# Patient Record
Sex: FEMALE | Race: WHITE | HISPANIC OR LATINO | Employment: PART TIME | URBAN - NONMETROPOLITAN AREA
[De-identification: names, ages, dates, MRNs, and addresses within clinical notes are randomized per-mention and may not be internally consistent; named-entity substitution may affect disease eponyms.]

---

## 2017-03-27 ENCOUNTER — OFFICE VISIT - GICH (OUTPATIENT)
Dept: FAMILY MEDICINE | Facility: OTHER | Age: 32
End: 2017-03-27

## 2017-03-27 ENCOUNTER — HISTORY (OUTPATIENT)
Dept: FAMILY MEDICINE | Facility: OTHER | Age: 32
End: 2017-03-27

## 2017-03-27 DIAGNOSIS — R30.0 DYSURIA: ICD-10-CM

## 2017-03-27 DIAGNOSIS — N30.00 ACUTE CYSTITIS WITHOUT HEMATURIA: ICD-10-CM

## 2017-03-27 LAB
BILIRUB UR QL: ABNORMAL
CLARITY, URINE: ABNORMAL CLARITY
COLOR UR: YELLOW COLOR
GLUCOSE URINE: NEGATIVE MG/DL
KETONES UR QL: NEGATIVE MG/DL
LEUKOCYTE ESTERASE URINE: ABNORMAL
NITRITE UR QL STRIP: POSITIVE
OCCULT BLOOD,URINE - HISTORICAL: ABNORMAL
PH UR: 6 [PH]
PROTEIN QUALITATIVE,URINE - HISTORICAL: ABNORMAL MG/DL
SP GR UR STRIP: >=1.03
UROBILINOGEN,QUALITATIVE - HISTORICAL: NORMAL EU/DL

## 2017-07-05 ENCOUNTER — OFFICE VISIT - GICH (OUTPATIENT)
Dept: FAMILY MEDICINE | Facility: OTHER | Age: 32
End: 2017-07-05

## 2017-07-05 ENCOUNTER — HISTORY (OUTPATIENT)
Dept: FAMILY MEDICINE | Facility: OTHER | Age: 32
End: 2017-07-05

## 2017-07-05 DIAGNOSIS — R30.0 DYSURIA: ICD-10-CM

## 2017-07-05 LAB
BACTERIA URINE: ABNORMAL BACTERIA/HPF
BILIRUB UR QL: NEGATIVE
CLARITY, URINE: ABNORMAL CLARITY
COLOR UR: YELLOW COLOR
EPITHELIAL CELLS: ABNORMAL EPI/HPF
GLUCOSE URINE: NEGATIVE MG/DL
KETONES UR QL: NEGATIVE MG/DL
LEUKOCYTE ESTERASE URINE: ABNORMAL
NITRITE UR QL STRIP: POSITIVE
OCCULT BLOOD,URINE - HISTORICAL: NEGATIVE
PH UR: 7 [PH]
PROTEIN QUALITATIVE,URINE - HISTORICAL: NEGATIVE MG/DL
RBC - HISTORICAL: ABNORMAL /HPF
SP GR UR STRIP: 1.02
UROBILINOGEN,QUALITATIVE - HISTORICAL: NORMAL EU/DL
WBC - HISTORICAL: ABNORMAL /HPF

## 2017-07-07 LAB
CULTURE - HISTORICAL: ABNORMAL
CULTURE - HISTORICAL: ABNORMAL
SUSCEPTIBILITY RESULT - HISTORICAL: ABNORMAL

## 2017-07-11 ENCOUNTER — AMBULATORY - GICH (OUTPATIENT)
Dept: FAMILY MEDICINE | Facility: OTHER | Age: 32
End: 2017-07-11

## 2017-07-18 ENCOUNTER — AMBULATORY - GICH (OUTPATIENT)
Dept: FAMILY MEDICINE | Facility: OTHER | Age: 32
End: 2017-07-18

## 2017-07-19 ENCOUNTER — HISTORY (OUTPATIENT)
Dept: FAMILY MEDICINE | Facility: OTHER | Age: 32
End: 2017-07-19

## 2017-07-19 ENCOUNTER — OFFICE VISIT - GICH (OUTPATIENT)
Dept: FAMILY MEDICINE | Facility: OTHER | Age: 32
End: 2017-07-19

## 2017-07-19 ENCOUNTER — HOSPITAL ENCOUNTER (OUTPATIENT)
Dept: RADIOLOGY | Facility: OTHER | Age: 32
End: 2017-07-19
Attending: NURSE PRACTITIONER

## 2017-07-19 DIAGNOSIS — S69.91XA UNSPECIFIED INJURY OF RIGHT WRIST, HAND AND FINGER(S), INITIAL ENCOUNTER: ICD-10-CM

## 2017-11-27 ENCOUNTER — AMBULATORY - GICH (OUTPATIENT)
Dept: FAMILY MEDICINE | Facility: OTHER | Age: 32
End: 2017-11-27

## 2017-11-27 ENCOUNTER — HISTORY (OUTPATIENT)
Dept: FAMILY MEDICINE | Facility: OTHER | Age: 32
End: 2017-11-27

## 2017-11-27 ENCOUNTER — OFFICE VISIT - GICH (OUTPATIENT)
Dept: FAMILY MEDICINE | Facility: OTHER | Age: 32
End: 2017-11-27

## 2017-11-27 DIAGNOSIS — M54.41 LOW BACK PAIN WITH RIGHT-SIDED SCIATICA: ICD-10-CM

## 2017-12-18 ENCOUNTER — AMBULATORY - GICH (OUTPATIENT)
Dept: FAMILY MEDICINE | Facility: OTHER | Age: 32
End: 2017-12-18

## 2017-12-19 ENCOUNTER — HISTORY (OUTPATIENT)
Dept: FAMILY MEDICINE | Facility: OTHER | Age: 32
End: 2017-12-19

## 2017-12-19 ENCOUNTER — AMBULATORY - GICH (OUTPATIENT)
Dept: RADIOLOGY | Facility: OTHER | Age: 32
End: 2017-12-19

## 2017-12-19 ENCOUNTER — OFFICE VISIT - GICH (OUTPATIENT)
Dept: FAMILY MEDICINE | Facility: OTHER | Age: 32
End: 2017-12-19

## 2017-12-19 DIAGNOSIS — M54.50 LOW BACK PAIN: ICD-10-CM

## 2017-12-19 DIAGNOSIS — R30.0 DYSURIA: ICD-10-CM

## 2017-12-19 LAB
BACTERIA URINE: ABNORMAL BACTERIA/HPF
BILIRUB UR QL: NEGATIVE
CLARITY, URINE: ABNORMAL CLARITY
COLOR UR: YELLOW COLOR
EPITHELIAL CELLS: ABNORMAL EPI/HPF
GLUCOSE URINE: NEGATIVE MG/DL
HCG UR QL: NEGATIVE
KETONES UR QL: NEGATIVE MG/DL
LEUKOCYTE ESTERASE URINE: ABNORMAL
NITRITE UR QL STRIP: POSITIVE
OCCULT BLOOD,URINE - HISTORICAL: ABNORMAL
PH UR: 6 [PH]
PROTEIN QUALITATIVE,URINE - HISTORICAL: NEGATIVE MG/DL
RBC - HISTORICAL: ABNORMAL /HPF
SP GR UR STRIP: 1.02
UROBILINOGEN,QUALITATIVE - HISTORICAL: NORMAL EU/DL
WBC - HISTORICAL: ABNORMAL /HPF

## 2017-12-21 ENCOUNTER — COMMUNICATION - GICH (OUTPATIENT)
Dept: FAMILY MEDICINE | Facility: OTHER | Age: 32
End: 2017-12-21

## 2017-12-22 ENCOUNTER — AMBULATORY - GICH (OUTPATIENT)
Dept: FAMILY MEDICINE | Facility: OTHER | Age: 32
End: 2017-12-22

## 2017-12-26 ENCOUNTER — HISTORY (OUTPATIENT)
Dept: EMERGENCY MEDICINE | Facility: OTHER | Age: 32
End: 2017-12-26

## 2017-12-27 NOTE — PROGRESS NOTES
Patient Information     Patient Name MRN Sex Nato Bustillos [id number] Female 1985      Progress Notes by Nick Velarde MD at 2017  4:00 PM     Author:  Nick Velarde MD Service:  (none) Author Type:  Physician     Filed:  2017  7:55 AM Encounter Date:  2017 Status:  Signed     :  Nick Velarde MD (Physician)            SUBJECTIVE:  Nato Bradley is a 32 y.o. Female.  She comes in due to 2 a 2 week history of urinary frequency, dribbling and dysuria. She has not had any fevers or chills that she is aware of but admits that she has not checked her temperature. She is not really sure if she is having back pain. She reports her  has had a vasectomy and she has had normal menses.      OBJECTIVE:  /64  Pulse 68  Wt 61.7 kg (136 lb)  BMI 26.12 kg/m2  EXAM:  General Appearance: Pleasant, alert, appropriate appearance for age. No acute distress  Gastrointestinal Exam: Mild suprapubic tenderness.  Musculoskeletal Exam: No CVA tenderness    Results for orders placed or performed in visit on 17      URINALYSIS W REFLEX MICROSCOPIC IF POSITIVE      Result  Value Ref Range    COLOR                     Yellow Yellow Color    CLARITY                   Slightly Cloudy (A) Clear Clarity    SPECIFIC GRAVITY,URINE    1.020 1.010, 1.015, 1.020, 1.025                    PH,URINE                  7.0 6.0, 7.0, 8.0, 5.5, 6.5, 7.5, 8.5                    UROBILINOGEN,QUALITATIVE  Normal Normal EU/dl    PROTEIN, URINE Negative Negative mg/dL    GLUCOSE, URINE Negative Negative mg/dL    KETONES,URINE             Negative Negative mg/dL    BILIRUBIN,URINE           Negative Negative                    OCCULT BLOOD,URINE        Negative Negative                    NITRITE                   Positive (A) Negative                    LEUKOCYTE ESTERASE        Trace (A) Negative                   URINALYSIS MICROSCOPIC      Result  Value Ref Range    RBC None Seen 0-2,  None Seen /HPF    WBC 0-2 0-2, 3-5, None Seen /HPF    BACTERIA                  Many (A) None Seen, Rare, Occasional, Few Bacteria/HPF    EPITHELIAL CELLS          Few None Seen, Few Epi/HPF   URINE CULTURE      Result  Value Ref Range    CULTURE RESULT (A)     CULTURE >100,000 CFU/mL Gram negative bacilli        ASSESSMENT/Plan :      Nato was seen today for uti.    Diagnoses and all orders for this visit:    Dysuria   his signs and symptoms consistent with urinary tract infection. We'll culture. Given length of symptoms I would suggest ciprofloxacin rather than Macrobid. I discussed with patient pros and cons of both medication. She will call if she is not having complete resolution of symptoms, or if she develops fevers, chills or flank pain.  -     URINALYSIS W REFLEX MICROSCOPIC IF POSITIVE; Future  -     URINALYSIS W REFLEX MICROSCOPIC IF POSITIVE  -     URINALYSIS MICROSCOPIC; Future  -     URINALYSIS MICROSCOPIC  -     ciprofloxacin HCl (CIPRO) 250 mg tablet; Take 1 tablet by mouth 2 times daily.  -     URINE CULTURE; Future  -     URINE CULTURE        There are no Patient Instructions on file for this visit.    Nick Velarde MD    This document was created using computer generated templates and voice activated software.

## 2017-12-28 NOTE — PROGRESS NOTES
"Patient Information     Patient Name MRN Sex Nato Bustillos [id number] Female 1985      Progress Notes by Romelia Sage NP at 2017 10:00 AM     Author:  Romelia Sage NP Service:  (none) Author Type:  PHYS- Nurse Practitioner     Filed:  2017 12:23 PM Encounter Date:  2017 Status:  Signed     :  Romelia Sage NP (PHYS- Nurse Practitioner)            Nursing Notes:   Michelle Kwon  2017 10:23 AM  Signed  Patient presents to clinic with pain in the right index,middle, and ring finger. She hit them on metal bunks beds Saturday.  Michelle Mcgee ....................  2017   10:11 AM    SUBJECTIVE:    Nato Bradley is a 32 y.o. female who presents for finger pain    HPI  Mechanism of injury: Hit fingers on bed now 2nd, 3rd and 4th fingers  Pain is described as sharp, tingling, achy and is located RT fingers  Intensity is 5-7 /10.  Duration has been for DOI 7/15/17  It does radiate slightly into the wrist.  Ice makes it better.  Using the hand makes it worse.  Associated symptoms include: none  Immediate pain, swelling and no bruising after the incident   There is past injury to the Fingers, contusions.     No current outpatient prescriptions on file prior to visit.     No current facility-administered medications on file prior to visit.        REVIEW OF SYSTEMS:  ROS    OBJECTIVE:  /54  Pulse 72  Temp 98.8  F (37.1  C) (Temporal)  Resp 18  Ht 1.543 m (5' 0.75\")  Wt 61.6 kg (135 lb 12.8 oz)  LMP 2017  BMI 25.87 kg/m2    EXAM:   Physical Exam   Constitutional: She is oriented to person, place, and time and well-developed, well-nourished, and in no distress.   HENT:   Head: Normocephalic and atraumatic.   Eyes: Conjunctivae are normal.   Cardiovascular: Normal rate.    Pulmonary/Chest: Effort normal. No respiratory distress.   Musculoskeletal:        Right wrist: Normal.        Right hand: She exhibits decreased range of motion and " tenderness. She exhibits no bony tenderness, normal capillary refill, no deformity, no laceration and no swelling. Normal sensation noted. Normal strength noted. She exhibits no thumb/finger opposition and no wrist extension trouble.   Neurological: She is alert and oriented to person, place, and time.   Skin: Skin is warm and dry.   Nursing note and vitals reviewed.    Completed Hand xray.  I personally reviewed the xray. There was no fractures noted upon initial read of xray.  Final read pending by radiology.    ASSESSMENT/PLAN:    ICD-10-CM    1. Injury of finger, right, initial encounter S69.91XA XR HAND 3 VIEWS RIGHT        Plan:  Finger contusions. Rest, ice, NSAIDs discussed. I explained my diagnostic considerations and recommendations to the patient, who voiced understanding and agreement with the treatment plan. All questions were answered. We discussed potential side effects of any prescribed or recommended therapies, as well as expectations for response to treatments. She was advised to contact our office if there is no improvement or worsening of conditions or symptoms.  If s/s worsen or persist, patient will either come back or follow up with PCP.       TAMAR MARTINEZ NP ....................  7/19/2017   12:23 PM

## 2017-12-28 NOTE — TELEPHONE ENCOUNTER
Patient Information     Patient Name MRN Sex Nato Torres [id number] Female 1985      Telephone Encounter by Yazmin Rascon at 2017 11:50 AM     Author:  Yazmin Rascon Service:  (none) Author Type:  (none)     Filed:  2017 11:50 AM Encounter Date:  2017 Status:  Signed     :  Yazmin Rascon            Appointment made.

## 2017-12-28 NOTE — PROGRESS NOTES
Patient Information     Patient Name MRN Sex Nato Torres [id number] Female 1985      Progress Notes by Parminder Cardona MD at 2017  3:30 PM     Author:  Parminder Cardona MD Service:  (none) Author Type:  Physician     Filed:  2017  3:58 PM Encounter Date:  2017 Status:  Signed     :  Parminder Cardona MD (Physician)            Nursing Notes:   Luana Huddleston  2017  3:42 PM  Signed  Patient presents to the clinic with back pain since September.  She's tried massage and chiro.  Luana Huddleston LPN....................2017 3:32 PM    Nato Bradley is a 32 y.o. female who presents for   Chief Complaint     Patient presents with       Back Pain      Pain since Sept.     HPI: Ms. Bradley has had marked R lower back pain with scitica to mid thigh; 3 weeks with no recall of injury; she has had similar but less severe symptoms in the past; sh esees chiro with minimal relief. Taking no medications regularly/ no trouble with bowel nor bladder and noloss of strength  Past Medical History:     Diagnosis  Date     History of depression      Past Surgical History:      Procedure  Laterality Date      SECTION       WISDOM TEETH EXTRACTION       Family History      Problem  Relation Age of Onset     Diabetes Mother      Hypertension Mother      Thyroid Disease Mother      Heart Disease Maternal Grandmother      Hypertension Maternal Grandmother      Heart Disease Maternal Grandfather      Cancer-prostate Maternal Grandfather      Diabetes Maternal Grandfather      Hypertension Maternal Grandfather      Hypertension Maternal Aunt      Thyroid Disease Maternal Aunt      Hypertension Paternal Aunt      Cancer-breast No Family History      Cancer-colon No Family History      Cancer-ovarian No Family History      Stroke No Family History      Blood Disease No Family History      Current Outpatient Prescriptions       Medication  Sig Dispense Refill     cyclobenzaprine  "(FLEXERIL) 10 mg tablet Take 1 tablet by mouth at bedtime if needed for Muscle Spasm. 10 tablet 0     predniSONE (DELTASONE) 20 mg tablet 3 daily for 3 days, then 2 daily for 3 days, then 1 daily for 3 with food 18 tablet 0     No current facility-administered medications for this visit.      Medications have been reviewed by me and are current to the best of my knowledge and ability.    No Known Allergies       EXAM:   Vitals:     11/27/17 1533   BP: 108/72   Temp: 99.8  F (37.7  C)   TempSrc: Temporal   Weight: 65 kg (143 lb 3.2 oz)   Height: 1.54 m (5' 0.63\")     General Appearance: Pleasant, alert, appropriate appearance for age. No acute distress  Musculoskeletal Exam: Back is straight and non-tender, full ROM of upper and lower extremities.  Neurologic Exam: Nonfocal; symmetric DTRs, normal gross motor movement, tone, and coordination. No tremor. Looks in pain but has great toe and heel walking  ASSESSMENT AND PLAN:  1. Acute right-sided low back pain with right-sided sciatica/ recheck if not markedly improved 1 week    - cyclobenzaprine (FLEXERIL) 10 mg tablet; Take 1 tablet by mouth at bedtime if needed for Muscle Spasm.  Dispense: 10 tablet; Refill: 0  - predniSONE (DELTASONE) 20 mg tablet; 3 daily for 3 days, then 2 daily for 3 days, then 1 daily for 3 with food  Dispense: 18 tablet; Refill: 0                 "

## 2017-12-29 NOTE — PATIENT INSTRUCTIONS
Patient Information     Patient Name MRN Sex Nato Torres [id number] Female 1985      Patient Instructions by Romelia Sage NP at 2017 10:00 AM     Author:  Romelia Sage NP Service:  (none) Author Type:  PHYS- Nurse Practitioner     Filed:  2017 11:02 AM Encounter Date:  2017 Status:  Signed     :  Romelia Sage NP (PHYS- Nurse Practitioner)            The x-ray today showed no sign of fracture. Radiologist will review the X-ray within 24-48 hours, I will contact you if the radiologist finds anything of significance on the x-ray that I did not see.    Rest the hand, avoid any activity which causes pain.    Apply cold packs to the affected area for 15-20 minutes, 4 times a day. A bag of frozen corn or peas often works well as a cold pack. A cold pack is usually the best treatment for the 1st 2 days after an injury. After 48 hours, apply heat or ice, whichever gives relief.    Elevate the injured area as much as you are able. If you can get this higher than the heart, this will help minimize pain and swelling.    Also, Take ibuprofen (Advil, Motrin) or naproxen (Aleve), or a similar prescription medication. Use regularly for the first 7-10 days. Later, take as needed for pain, swelling or stiffness. Take this type of medication with food to minimize any stomach irritation. Tylenol may also be taken to help ease the pain.    Call or return to clinic as needed if your pain becomes significantly worse, or fails to improve as anticipated despite following the above recommendations.

## 2017-12-30 NOTE — NURSING NOTE
Patient Information     Patient Name MRN Sex Nato Torres [id number] Female 1985      Nursing Note by Michelle Kwon at 2017 10:00 AM     Author:  Michelle Kwon Service:  (none) Author Type:  (none)     Filed:  2017 10:23 AM Encounter Date:  2017 Status:  Signed     :  Michelle Kwon            Patient presents to clinic with pain in the right index,middle, and ring finger. She hit them on metal bunks beds Saturday.  Michelle KwonLPN ....................  2017   10:11 AM

## 2017-12-30 NOTE — NURSING NOTE
Patient Information     Patient Name MRN Sex Nato Torres [id number] Female 1985      Nursing Note by Luana Huddleston at 2017  3:30 PM     Author:  Luana Huddleston Service:  (none) Author Type:  (none)     Filed:  2017  3:42 PM Encounter Date:  2017 Status:  Signed     :  Luana Huddleston            Patient presents to the clinic with back pain since September.  She's tried massage and chiro.  Luana Huddleston LPN....................2017 3:32 PM

## 2018-01-04 NOTE — NURSING NOTE
Patient Information     Patient Name MRN Sex Nato Torres [id number] Female 1985      Nursing Note by Martha Henry at 3/27/2017 12:00 PM     Author:  Martha Henry Service:  (none) Author Type:  (none)     Filed:  3/27/2017 12:19 PM Encounter Date:  3/27/2017 Status:  Signed     :  Martha Henryashlee Bradley is a 32 y.o. female presenting with dysuria. Urine analysis initiated per verbal order that was read back and verified.   Martha Henry LPN 3/27/2017 12:06 PM

## 2018-01-04 NOTE — PATIENT INSTRUCTIONS
Patient Information     Patient Name MRN Sex Nato Torres [id number] Female 1985      Patient Instructions by Ranjith He MD at 3/27/2017 12:00 PM     Author:  Ranjith He MD Service:  (none) Author Type:  Physician     Filed:  3/27/2017 12:29 PM Encounter Date:  3/27/2017 Status:  Signed     :  Ranjith He MD (Physician)            Sending urine for culture  Take Bactrim twice daily for 5 days  Call if side effects or MyChart message  Urine culture back Wed to see if Bactrim will work  Return for fever, back pain, nausea

## 2018-01-04 NOTE — PROGRESS NOTES
Patient Information     Patient Name MRN Sex Nato Torres [id number] Female 1985      Progress Notes by Ranjith He MD at 3/27/2017 12:00 PM     Author:  Ranjith He MD Service:  (none) Author Type:  Physician     Filed:  3/28/2017  8:19 AM Encounter Date:  3/27/2017 Status:  Signed     :  Ranjith He MD (Physician)            SUBJECTIVE:  32 y.o. female presents as walk-in to [facility] for dysuria for a week. Some bilateral flank pain. Hx of numerous UTI, but none for months. She cannot recall exactly when, but [place] was .  Is breast feeding 11 mo old  Denies fever or nausea.   Hx of pyelo when she was a teenager.      No current outpatient prescriptions on file.     Allergies as of 2017      (No Known Allergies)       OBJECTIVE:  Visit Vitals       /78     Pulse 68     Wt 63 kg (139 lb)     Breastfeeding No     BMI 26.7 kg/m2     General Appearance: Pleasant, alert, appropriate appearance for age. No acute distress  Neurologic Exam: bilateral lower back pain, but not CVA tenderness    Results for orders placed or performed in visit on 17      UA DIP ONLY GRH GIH NON AUTOMATED      Result  Value Ref Range    COLOR                     Yellow Yellow Color    CLARITY                   Cloudy (A) Clear Clarity    SPECIFIC GRAVITY,URINE    >=1.030 (A) 1.010, 1.015, 1.020, 1.025                    PH,URINE                  6.0 6.0, 7.0, 8.0, 5.5, 6.5, 7.5, 8.5                    UROBILINOGEN,QUALITATIVE  Normal Normal EU/dl    PROTEIN, URINE Trace (A) Negative mg/dL    GLUCOSE, URINE Negative Negative mg/dL    KETONES,URINE             Negative Negative mg/dL    BILIRUBIN,URINE           Abnormal (A) Negative                    OCCULT BLOOD,URINE        Large (A) Negative                    NITRITE                   Positive (A) Negative                    LEUKOCYTE ESTERASE        Moderate (A)  Negative                        ASSESSMENT/PLAN:    ICD-10-CM   1. Acute cystitis without hematuria N30.00   2. Dysuria R30.0     UA shows UTI. She had problems with some antibiotics in the past ,but does not recall which. For breastfeeding and possible coverage for pyelo given previous problems, recommend Bactrim over nitrofurantoin, cipro or other meds. If she has problems with this, then consider cefdinir or other cephalosporin.  Plan to send urine for culture. Call if side effects with medication.   F/U PRN

## 2018-01-27 VITALS
BODY MASS INDEX: 25.91 KG/M2 | SYSTOLIC BLOOD PRESSURE: 108 MMHG | WEIGHT: 136 LBS | DIASTOLIC BLOOD PRESSURE: 64 MMHG | HEART RATE: 68 BPM

## 2018-01-27 VITALS
SYSTOLIC BLOOD PRESSURE: 108 MMHG | BODY MASS INDEX: 27.03 KG/M2 | TEMPERATURE: 99.8 F | DIASTOLIC BLOOD PRESSURE: 72 MMHG | WEIGHT: 143.2 LBS | HEIGHT: 61 IN

## 2018-01-27 VITALS
BODY MASS INDEX: 26.7 KG/M2 | SYSTOLIC BLOOD PRESSURE: 120 MMHG | WEIGHT: 139 LBS | HEART RATE: 68 BPM | DIASTOLIC BLOOD PRESSURE: 78 MMHG

## 2018-01-27 VITALS
HEIGHT: 61 IN | BODY MASS INDEX: 25.64 KG/M2 | SYSTOLIC BLOOD PRESSURE: 122 MMHG | DIASTOLIC BLOOD PRESSURE: 54 MMHG | TEMPERATURE: 98.8 F | HEART RATE: 72 BPM | RESPIRATION RATE: 18 BRPM | WEIGHT: 135.8 LBS

## 2018-01-28 ENCOUNTER — HEALTH MAINTENANCE LETTER (OUTPATIENT)
Age: 33
End: 2018-01-28

## 2018-02-03 NOTE — PROGRESS NOTES
SUBJECTIVE:   CC: Nato Bradley is an 33 year old woman who presents for preventive health visit.     HPI    patient reports a family hx of thyroid disorder. Sister recently diagnosed with hashimoto's. Mom with hx of hyperthyroidism.   Patient reports hx of recurrent UTIs. 3 UAs with + nitrate in the past year (one without), no urine cultures done. Patient does not currently have symptoms.       Today's PHQ-2 Score:   PHQ-2 ( 1999 [employer]) 2/12/2018   Q1: Little interest or pleasure in doing things 0   Q2: Feeling down, depressed or hopeless 0   PHQ-2 Score 0         Social History   Substance Use Topics     Smoking status: Never Smoker     Smokeless tobacco: Never Used     Alcohol use 0.0 oz/week      Comment: Alcoholic Drinks/day: Beer or wine     No flowsheet data found.    Reviewed orders with patient.  Reviewed health maintenance and updated orders accordingly - Yes  Patient Active Problem List   Diagnosis     Depressive disorder     Past Medical History:   Diagnosis Date     Personal history of other mental and behavioral disorders     No Comments Provided     Family History   Problem Relation Age of Onset     DIABETES Mother      Diabetes     Hypertension Mother      Hypertension     Thyroid Disease Mother      Thyroid Disease     Thyroid Disease Sister      hashimoto's (dx 2018)     HEART DISEASE Maternal Grandmother      Heart Disease     Hypertension Maternal Grandmother      Hypertension     Lung Cancer Maternal Grandmother      ? cervical cancer in 20s.      HEART DISEASE Maternal Grandfather      Heart Disease     Prostate Cancer Maternal Grandfather      Cancer-prostate     DIABETES Maternal Grandfather      Diabetes     Hypertension Maternal Grandfather      Hypertension     Hypertension Maternal Aunt      Hypertension     Thyroid Disease Maternal Aunt      Thyroid Disease     Hypertension Paternal Aunt      Hypertension     Breast Cancer No family hx of      Cancer-breast     Colon Cancer No  family hx of      Cancer-colon     Ovarian Cancer No family hx of      Cancer-ovarian     Other - See Comments No family hx of      Stroke     Blood Disease No family hx of      Blood Disease       Family History   Problem Relation Age of Onset     DIABETES Mother      Diabetes     Hypertension Mother      Hypertension     Thyroid Disease Mother      Thyroid Disease     Thyroid Disease Sister      hashimoto's (dx 2018)     HEART DISEASE Maternal Grandmother      Heart Disease     Hypertension Maternal Grandmother      Hypertension     Lung Cancer Maternal Grandmother      ? cervical cancer in 20s.      HEART DISEASE Maternal Grandfather      Heart Disease     Prostate Cancer Maternal Grandfather      Cancer-prostate     DIABETES Maternal Grandfather      Diabetes     Hypertension Maternal Grandfather      Hypertension     Hypertension Maternal Aunt      Hypertension     Thyroid Disease Maternal Aunt      Thyroid Disease     Hypertension Paternal Aunt      Hypertension     Breast Cancer No family hx of      Cancer-breast     Colon Cancer No family hx of      Cancer-colon     Ovarian Cancer No family hx of      Cancer-ovarian     Other - See Comments No family hx of      Stroke     Blood Disease No family hx of      Blood Disease     Mammogram not appropriate for this patient based on age.    History of abnormal Pap smear: NO - age 30-65 PAP every 5 years with negative HPV co-testing recommended    Reviewed and updated as needed this visit by clinical staff  Tobacco  Allergies  Meds  Soc Hx        Reviewed and updated as needed this visit by Provider          Review of Systems  C: NEGATIVE for fever, chills, change in weight  I: NEGATIVE for worrisome rashes, moles or lesions  E: NEGATIVE for vision changes or irritation  ENT: NEGATIVE for ear, mouth and throat problems  R: NEGATIVE for significant cough or SOB  B: NEGATIVE for masses, tenderness or discharge  CV: NEGATIVE for chest pain, palpitations or peripheral  "edema  GI: NEGATIVE for nausea, abdominal pain, heartburn, or change in bowel habits  : NEGATIVE for unusual urinary or vaginal symptoms. Periods are regular.  M: NEGATIVE for significant arthralgias or myalgia  N: NEGATIVE for weakness, dizziness or paresthesias  P: NEGATIVE for changes in mood or affect     OBJECTIVE:   /78 (BP Location: Right arm, Patient Position: Sitting, Cuff Size: Adult Regular)  Pulse 62  Temp 98.1  F (36.7  C) (Tympanic)  Ht 5' 0.24\" (1.53 m)  Wt 145 lb (65.8 kg)  LMP  (Within Weeks)  Breastfeeding? No  BMI 28.1 kg/m2  Physical Exam  GENERAL: healthy, alert and no distress  EYES: Eyes grossly normal to inspection, PERRL and conjunctivae and sclerae normal  HENT: ear canals and TM's normal, nose and mouth without ulcers or lesions  NECK: no adenopathy, no asymmetry, masses, or scars and thyroid normal to palpation  RESP: lungs clear to auscultation - no rales, rhonchi or wheezes  BREAST: normal without masses, tenderness or nipple discharge and no palpable axillary masses or adenopathy  CV: regular rate and rhythm, normal S1 S2, no S3 or S4, no murmur, click or rub, no peripheral edema and peripheral pulses strong  ABDOMEN: soft, nontender, no hepatosplenomegaly, no masses and bowel sounds normal   (female): normal female external genitalia, normal urethral meatus, vaginal mucosa pink, moist, well rugated, and normal cervix/adnexa/uterus without masses or discharge  Pap smear obtained.   MS: no gross musculoskeletal defects noted, no edema  SKIN: no suspicious lesions or rashes  NEURO: Normal strength and tone, mentation intact and speech normal  PSYCH: mentation appears normal, affect normal/bright    ASSESSMENT/PLAN:       ICD-10-CM    1. Family history of thyroid disease Z83.49 Thyrotropin GH     Thyroid peroxidase antibody     T4, free     T3, total   2. Screening for cervical cancer Z12.4 Pap Screen Thin Prep with HPV - recommended age 30 - 65 years (select HPV order " "below)   3. Health care maintenance Z00.00 Lipid Profile (Chol, Trig, HDL, LDL calc) - NON FASTING       COUNSELING:  Reviewed preventive health counseling, as reflected in patient instructions       reports that she has never smoked. She has never used smokeless tobacco.    Estimated body mass index is 28.1 kg/(m^2) as calculated from the following:    Height as of this encounter: 5' 0.24\" (1.53 m).    Weight as of this encounter: 145 lb (65.8 kg).   Weight management plan: Discussed healthy diet and exercise guidelines and patient will follow up in 12 months in clinic to re-evaluate.    Counseling Resources:  ATP IV Guidelines  Pooled Cohorts Equation Calculator  Breast Cancer Risk Calculator  FRAX Risk Assessment  ICSI Preventive Guidelines  Dietary Guidelines for Americans, 2010  USDA's MyPlate  ASA Prophylaxis  Lung CA Screening    Kristal Becker MD  [facility]  "

## 2018-02-08 ENCOUNTER — DOCUMENTATION ONLY (OUTPATIENT)
Dept: FAMILY MEDICINE | Facility: OTHER | Age: 33
End: 2018-02-08

## 2018-02-09 VITALS
WEIGHT: 141 LBS | TEMPERATURE: 98.9 F | DIASTOLIC BLOOD PRESSURE: 80 MMHG | SYSTOLIC BLOOD PRESSURE: 122 MMHG | BODY MASS INDEX: 26.97 KG/M2

## 2018-02-12 ENCOUNTER — OFFICE VISIT (OUTPATIENT)
Dept: FAMILY MEDICINE | Facility: OTHER | Age: 33
End: 2018-02-12
Attending: FAMILY MEDICINE
Payer: COMMERCIAL

## 2018-02-12 VITALS
DIASTOLIC BLOOD PRESSURE: 78 MMHG | WEIGHT: 145 LBS | HEIGHT: 60 IN | HEART RATE: 62 BPM | TEMPERATURE: 98.1 F | BODY MASS INDEX: 28.47 KG/M2 | SYSTOLIC BLOOD PRESSURE: 122 MMHG

## 2018-02-12 DIAGNOSIS — Z00.00 HEALTH CARE MAINTENANCE: ICD-10-CM

## 2018-02-12 DIAGNOSIS — Z83.49 FAMILY HISTORY OF THYROID DISEASE: Primary | ICD-10-CM

## 2018-02-12 DIAGNOSIS — Z12.4 SCREENING FOR CERVICAL CANCER: ICD-10-CM

## 2018-02-12 LAB
CHOLEST SERPL-MCNC: 164 MG/DL
HDLC SERPL-MCNC: 40 MG/DL (ref 23–92)
LDLC SERPL CALC-MCNC: 101 MG/DL
NONHDLC SERPL-MCNC: 124 MG/DL
T3 SERPL-MCNC: 141 NG/DL (ref 87–178)
T4 FREE SERPL-MCNC: 0.81 NG/DL (ref 0.6–1.6)
TRIGL SERPL-MCNC: 115 MG/DL
TSH SERPL DL<=0.05 MIU/L-ACNC: 1.28 IU/ML (ref 0.34–5.6)

## 2018-02-12 PROCEDURE — 87624 HPV HI-RISK TYP POOLED RSLT: CPT | Performed by: FAMILY MEDICINE

## 2018-02-12 PROCEDURE — 84443 ASSAY THYROID STIM HORMONE: CPT | Performed by: FAMILY MEDICINE

## 2018-02-12 PROCEDURE — 88142 CYTOPATH C/V THIN LAYER: CPT | Performed by: FAMILY MEDICINE

## 2018-02-12 PROCEDURE — 99395 PREV VISIT EST AGE 18-39: CPT | Mod: 25 | Performed by: FAMILY MEDICINE

## 2018-02-12 PROCEDURE — 84480 ASSAY TRIIODOTHYRONINE (T3): CPT | Performed by: FAMILY MEDICINE

## 2018-02-12 PROCEDURE — 84439 ASSAY OF FREE THYROXINE: CPT | Performed by: FAMILY MEDICINE

## 2018-02-12 PROCEDURE — 36415 COLL VENOUS BLD VENIPUNCTURE: CPT | Performed by: FAMILY MEDICINE

## 2018-02-12 PROCEDURE — 86376 MICROSOMAL ANTIBODY EACH: CPT | Performed by: FAMILY MEDICINE

## 2018-02-12 PROCEDURE — G0123 SCREEN CERV/VAG THIN LAYER: HCPCS | Performed by: FAMILY MEDICINE

## 2018-02-12 PROCEDURE — 80061 LIPID PANEL: CPT | Performed by: FAMILY MEDICINE

## 2018-02-12 RX ORDER — AMOXICILLIN AND CLAVULANATE POTASSIUM 500; 125 MG/1; MG/1
1 TABLET, FILM COATED ORAL 2 TIMES DAILY
COMMUNITY
End: 2018-07-10

## 2018-02-12 ASSESSMENT — PAIN SCALES - GENERAL: PAINLEVEL: NO PAIN (0)

## 2018-02-12 NOTE — TELEPHONE ENCOUNTER
Patient Information     Patient Name MRN Sex Nato Torres [id number] Female 1985      Telephone Encounter by Nick Velarde MD at 2017  1:22 PM     Author:  Nick Velarde MD Service:  (none) Author Type:  Physician     Filed:  2017  1:23 PM Encounter Date:  2017 Status:  Signed     :  Nick Velarde MD (Physician)            D/W patient, apparently she had an MRI ordered already by her chiropractor and doesn't need further evaluation on this issue from me at this time.

## 2018-02-12 NOTE — NURSING NOTE
Patient Information     Patient Name MRN Sex Nato Torres [id number] Female 1985      Nursing Note by Yazmin Rascon at 2017 10:00 AM     Author:  Yazmin Rascon Service:  (none) Author Type:  (none)     Filed:  2017 10:24 AM Encounter Date:  2017 Status:  Signed     :  Yazmin Rascon            Patient comes in to the clinic today with a one week history of dysuria.

## 2018-02-12 NOTE — TELEPHONE ENCOUNTER
Patient Information     Patient Name MRN Sex Nato Torres [id number] Female 1985      Telephone Encounter by Milagros Gil at 2017  4:24 PM     Author:  Milagros Gil Service:  (none) Author Type:  (none)     Filed:  2017  4:25 PM Encounter Date:  2017 Status:  Signed     :  Milagros Gil            Called denial to [employer]; they will contact the patient.  Milagros Gil [facility] ([facility])................ 2017 4:25 PM

## 2018-02-12 NOTE — PROGRESS NOTES
Patient Information     Patient Name MRN Sex     Nato Bradley [id number] Female 1985      Progress Notes by Nick Velarde MD at 2017 10:00 AM     Author:  Nick Velarde MD Service:  (none) Author Type:  Physician     Filed:  2017 11:06 AM Encounter Date:  2017 Status:  Signed     :  Nick Velarde MD (Physician)            Nursing Notes:   Yazmin Rascon  2017 10:24 AM  Signed  Patient comes in to the clinic today with a one week history of dysuria.      SUBJECTIVE:  Nato Bradley is a 32 y.o. Female.  She comes in with one-week history of urinary frequency and dysuria She has not had fevers or chills. She has has had some increased low back pain that she has been working with her chiropractor on. She reports she recently ordered an MRI. She has had pyelonephritis in the past but reports that symptoms do not feel consistent with that and are not really located and flanks.      OBJECTIVE:  /80  Temp 98.9  F (37.2  C) (Tympanic)  Wt 64 kg (141 lb)  LMP 2017  Breastfeeding? No  BMI 26.97 kg/m2  EXAM:  Abdomen: ASNT, NO CVA tenderness    Results for orders placed or performed in visit on 17        URINALYSIS W REFLEX MICROSCOPIC IF POSITIVE        Result   Value  Ref Range    COLOR                      Yellow  Yellow Color    CLARITY                    Cloudy (A)  Clear Clarity    SPECIFIC GRAVITY,URINE     1.020  1.010, 1.015, 1.020, 1.025                    PH,URINE                   6.0  6.0, 7.0, 8.0, 5.5, 6.5, 7.5, 8.5                    UROBILINOGEN,QUALITATIVE   Normal  Normal EU/dl    PROTEIN, URINE  Negative  Negative mg/dL    GLUCOSE, URINE  Negative  Negative mg/dL    KETONES,URINE              Negative  Negative mg/dL    BILIRUBIN,URINE            Negative  Negative                    OCCULT BLOOD,URINE         Trace (A)  Negative                    NITRITE                    Positive (A)  Negative                     LEUKOCYTE ESTERASE         Moderate (A)  Negative                   URINALYSIS MICROSCOPIC        Result   Value  Ref Range    RBC  None Seen  0-2, None Seen /HPF    WBC  26-50 (A)  0-2, 3-5, None Seen /HPF    BACTERIA                   Many (A)  None Seen, Rare, Occasional, Few Bacteria/HPF    EPITHELIAL CELLS           Moderate (A)  None Seen, Few Epi/HPF   Urine pregnancy test (HCG), qualitative        Result   Value  Ref Range    PREGNANCY,URINE            Negative  Negative   URINE CULTURE        Result   Value  Ref Range    CULTURE  RESULT (A)      CULTURE  >100,000 CFU/mL Escherichia coli         Susceptibility         Escherichia coli -  (no method available)         AMPICILLIN <=8 S ug/mL     AZTREONAM <=8 S ug/mL     CEFEPIME <=8 S ug/mL     CEFOTAXIME <=2 S ug/mL     CEFTAZIDIME <=1 S ug/mL     CEFTRIAXONE <=8 S ug/mL     CEFUROXIME 8 S ug/mL     CIPROFLOXACIN <=1 S ug/mL     GENTAMICIN <=4 S ug/mL     IMIPENEM <=1 S ug/mL     LEVOFLOXACIN <=2 S ug/mL     PIPERACILLIN/TAZO <=16 S ug/mL     TETRACYCLINE <=4 S ug/mL     TRIMETHOPRIM/SULF <=2 S ug/mL     NITROFURANTOIN <=32 S ug/mL       ASSESSMENT/Plan :      Nato was seen today for dysuria.    Diagnoses and all orders for this visit:    Dysuria  patient presents with history consistent with acute cystitis. Laboratory confirms that. Culture reveals pansensitive Escherichia coli. Patient was treated with ciprofloxacin which should resolve the current infection. Follow-up if not improving or any new/worsening symptoms  -     URINALYSIS W REFLEX MICROSCOPIC IF POSITIVE; Future  -     URINALYSIS W REFLEX MICROSCOPIC IF POSITIVE  -     URINALYSIS MICROSCOPIC; Future  -     URINALYSIS MICROSCOPIC  -     ciprofloxacin HCl (CIPRO) 250 mg tablet; Take 1 tablet by mouth 2 times daily.  -     Urine pregnancy test (HCG), qualitative; Future  -     URINE CULTURE; Future  -     Urine pregnancy test (HCG), qualitative  -     URINE CULTURE        There are no Patient  Instructions on file for this visit.    Nick Velarde MD    This document was created using computer generated templates and voice activated software.

## 2018-02-12 NOTE — TELEPHONE ENCOUNTER
Patient Information     Patient Name MRN Sex Nato Torres [id number] Female 1985      Telephone Encounter by Milagros Gil at 2017  3:29 PM     Author:  Milagros Gil Service:  (none) Author Type:  (none)     Filed:  2017  3:30 PM Encounter Date:  2017 Status:  Signed     :  Milagros Gil            Upon review, patient was seen by Parminder Cardona MD for her back pain.  I will route this request for MRI orders to him.  He will be back on 17 to address this.  Milagros Gil [facility] ([facility])................ 2017 3:30 PM

## 2018-02-12 NOTE — MR AVS SNAPSHOT
"              After Visit Summary   2/12/2018    Nato Bradley    MRN: [id number]           Patient Information     Date Of Birth          1985        Visit Information        Provider Department      2/12/2018 12:45 PM Kristal Becker MD [facility]        Today's Diagnoses     Family history of thyroid disease    -  1    Screening for cervical cancer        Health care maintenance           Follow-ups after your visit        Who to contact     If you have questions or need follow up information about today's clinic visit or your schedule please contact [facility] directly at [phone].  Normal or non-critical lab and imaging results will be communicated to you by [employer] sentronicshart, letter or phone within 4 business days after the clinic has received the results. If you do not hear from us within 7 days, please contact the clinic through [employer] Play[employer] or phone. If you have a critical or abnormal lab result, we will notify you by phone as soon as possible.  Submit refill requests through [employer] or call your pharmacy and they will forward the refill request to us. Please allow 3 business days for your refill to be completed.          Additional Information About Your Visit        MyChart Information     [employer] gives you secure access to your electronic health record. If you see a primary care provider, you can also send messages to your care team and make appointments. If you have questions, please call your primary care clinic.  If you do not have a primary care provider, please call [phone] and they will assist you.        Care EveryWhere ID     This is your Care EveryWhere ID. This could be used by other organizations to access your [facility] medical records  [id number]        Your Vitals Were     Pulse Temperature Height Last Period Breastfeeding? BMI (Body Mass Index)    62 98.1  F (36.7  C) (Tympanic) 5' 0.24\" (1.53 m) (Within Weeks) No 28.1 kg/m2       Blood Pressure " from Last 3 Encounters:   02/12/18 122/78   12/19/17 122/80   11/27/17 108/72    Weight from Last 3 Encounters:   02/12/18 145 lb (65.8 kg)   12/19/17 141 lb (64 kg)   11/27/17 143 lb 3.2 oz (65 kg)              We Performed the Following     Lipid Profile (Chol, Trig, HDL, LDL calc) - NON FASTING     Pap Screen Thin Prep with HPV - recommended age 30 - 65 years (select HPV order below)     T3, total     T4, free     Thyroid peroxidase antibody     Thyrotropin GH        Primary Care Provider Office Phone # Fax #    Nick Velarde [phone] [address]  [facility] [address]        Equal Access to Services     FRANSISCA SERRANO : Juanita Schaeffer, waaxda luqadaha, qaybta kaalmada zain, steff cohen. So [facility] [phone].    ATENCIÓN: Si habla español, tiene a peck disposición servicios gratuitos de asistencia lingüística. [facility] [phone].    We comply with applicable federal civil rights laws and [place] laws. We do not discriminate on the basis of race, color, national origin, age, disability, sex, sexual orientation, or gender identity.            Thank you!     Thank you for choosing [facility] AND [facility]  for your care. Our goal is always to provide you with excellent care. Hearing back from our patients is one way we can continue to improve our services. Please take a few minutes to complete the written survey that you may receive in the mail after your visit with us. Thank you!             Your Updated Medication List - Protect others around you: Learn how to safely use, store and throw away your medicines at www.disposemymeds.org.          This list is accurate as of 2/12/18  6:10 PM.  Always use your most recent med list.                   Brand Name Dispense Instructions for use Diagnosis    amoxicillin-clavulanate 500-125 MG per tablet    AUGMENTIN     Take 1 tablet by mouth 2 times daily

## 2018-02-12 NOTE — TELEPHONE ENCOUNTER
Patient Information     Patient Name MRN Sex Nato Torres [id number] Female 1985      Telephone Encounter by Nick Velarde MD at 2017  2:17 PM     Author:  Nick Velarde MD Service:  (none) Author Type:  Physician     Filed:  2017  2:18 PM Encounter Date:  2017 Status:  Signed     :  Nick Velarde MD (Physician)            Discussed with patient need for OV so we can document her radicular symptoms.  Certainly her description of symptoms suggests potential for nerve root impingement and chiropractic care has been ineffective thus far.  She will see Dr. He on Tuesday morning for a second opinion to see about getting imaging done in .

## 2018-02-12 NOTE — NURSING NOTE
"Chief Complaint   Patient presents with     Physical       Initial /78 (BP Location: Right arm, Patient Position: Sitting, Cuff Size: Adult Regular)  Pulse 62  Temp 98.1  F (36.7  C) (Tympanic)  Ht 5' 0.24\" (1.53 m)  Wt 145 lb (65.8 kg)  LMP  (Within Weeks)  Breastfeeding? No  BMI 28.1 kg/m2 Estimated body mass index is 28.1 kg/(m^2) as calculated from the following:    Height as of this encounter: 5' 0.24\" (1.53 m).    Weight as of this encounter: 145 lb (65.8 kg).  Medication Reconciliation: complete   Rebekah Mosley LPN    "

## 2018-02-12 NOTE — TELEPHONE ENCOUNTER
Patient Information     Patient Name MRN Sex Nato Torres [id number] Female 1985      Telephone Encounter by Parminder Cardona MD at 2017  3:32 PM     Author:  Parminder Cardona MD Service:  (none) Author Type:  Physician     Filed:  2017  3:32 PM Encounter Date:  2017 Status:  Signed     :  Parminder Cardona MD (Physician)            I do not do MR orders this way - requires assessment to determine if appropriate

## 2018-02-13 DIAGNOSIS — Z12.4 CERVICAL CANCER SCREENING: Primary | ICD-10-CM

## 2018-02-13 LAB — COPATH REPORT: NORMAL

## 2018-02-14 LAB — THYROPEROXIDASE AB SERPL-ACNC: <10 IU/ML

## 2018-02-19 LAB
FINAL DIAGNOSIS: ABNORMAL
HPV HR 12 DNA CVX QL NAA+PROBE: POSITIVE
HPV16 DNA SPEC QL NAA+PROBE: NEGATIVE
HPV18 DNA SPEC QL NAA+PROBE: NEGATIVE
SPECIMEN DESCRIPTION: ABNORMAL
SPECIMEN SOURCE CVX/VAG CYTO: ABNORMAL

## 2018-02-22 ENCOUNTER — MYC MEDICAL ADVICE (OUTPATIENT)
Dept: FAMILY MEDICINE | Facility: OTHER | Age: 33
End: 2018-02-22

## 2018-02-26 NOTE — TELEPHONE ENCOUNTER
Patient notified and she did see the PrivateFly message before I called.   Belkis Rivera LPN ..........2/26/2018 11:47 AM

## 2018-02-26 NOTE — TELEPHONE ENCOUNTER
----- Message from Kristal Becker MD sent at 2/26/2018 11:26 AM CST -----  This was sent to the patient on HESKA but she hasn't checked it. Please call her with this info and document in a phone note.     Nato: The main type of High Risk HPV (the virus that causes cervical cancer) was negative. But the test did return positive for another type of high risk HPV (less commonly associated wit cervical cancer). Since your pap smear was normal, no further testing is needed at this time. But instead of repeating your pap in 5 years, we need to repeat it again next year. Please let me know if you have any questions. Kristal Becker MD

## 2018-05-06 ENCOUNTER — MYC MEDICAL ADVICE (OUTPATIENT)
Dept: FAMILY MEDICINE | Facility: OTHER | Age: 33
End: 2018-05-06

## 2018-07-10 ENCOUNTER — OFFICE VISIT (OUTPATIENT)
Dept: FAMILY MEDICINE | Facility: OTHER | Age: 33
End: 2018-07-10
Attending: FAMILY MEDICINE
Payer: COMMERCIAL

## 2018-07-10 VITALS
SYSTOLIC BLOOD PRESSURE: 120 MMHG | BODY MASS INDEX: 25.86 KG/M2 | DIASTOLIC BLOOD PRESSURE: 70 MMHG | HEIGHT: 61 IN | WEIGHT: 137 LBS | HEART RATE: 80 BPM

## 2018-07-10 DIAGNOSIS — Z83.49 FAMILY HISTORY OF THYROID DISEASE: ICD-10-CM

## 2018-07-10 DIAGNOSIS — F41.9 ANXIETY AND DEPRESSION: Primary | ICD-10-CM

## 2018-07-10 DIAGNOSIS — F32.A ANXIETY AND DEPRESSION: Primary | ICD-10-CM

## 2018-07-10 LAB — TSH SERPL DL<=0.05 MIU/L-ACNC: 0.89 IU/ML (ref 0.34–5.6)

## 2018-07-10 PROCEDURE — 36415 COLL VENOUS BLD VENIPUNCTURE: CPT | Performed by: FAMILY MEDICINE

## 2018-07-10 PROCEDURE — 84443 ASSAY THYROID STIM HORMONE: CPT | Performed by: FAMILY MEDICINE

## 2018-07-10 PROCEDURE — 99213 OFFICE O/P EST LOW 20 MIN: CPT | Performed by: FAMILY MEDICINE

## 2018-07-10 RX ORDER — TRAZODONE HYDROCHLORIDE 50 MG/1
50 TABLET, FILM COATED ORAL
Qty: 30 TABLET | Refills: 11 | Status: SHIPPED | OUTPATIENT
Start: 2018-07-10 | End: 2019-08-16

## 2018-07-10 RX ORDER — BUPROPION HYDROCHLORIDE 150 MG/1
150 TABLET ORAL EVERY MORNING
Qty: 90 TABLET | Refills: 3 | Status: SHIPPED | OUTPATIENT
Start: 2018-07-10 | End: 2018-10-26

## 2018-07-10 ASSESSMENT — ENCOUNTER SYMPTOMS
SLEEP DISTURBANCE: 1
DECREASED CONCENTRATION: 1
NERVOUS/ANXIOUS: 1
SEIZURES: 0
FEVER: 0
FATIGUE: 1
ACTIVITY CHANGE: 1

## 2018-07-10 ASSESSMENT — ANXIETY QUESTIONNAIRES
IF YOU CHECKED OFF ANY PROBLEMS ON THIS QUESTIONNAIRE, HOW DIFFICULT HAVE THESE PROBLEMS MADE IT FOR YOU TO DO YOUR WORK, TAKE CARE OF THINGS AT HOME, OR GET ALONG WITH OTHER PEOPLE: VERY DIFFICULT
7. FEELING AFRAID AS IF SOMETHING AWFUL MIGHT HAPPEN: NEARLY EVERY DAY
GAD7 TOTAL SCORE: 16
6. BECOMING EASILY ANNOYED OR IRRITABLE: NEARLY EVERY DAY
2. NOT BEING ABLE TO STOP OR CONTROL WORRYING: MORE THAN HALF THE DAYS
1. FEELING NERVOUS, ANXIOUS, OR ON EDGE: MORE THAN HALF THE DAYS
5. BEING SO RESTLESS THAT IT IS HARD TO SIT STILL: MORE THAN HALF THE DAYS
3. WORRYING TOO MUCH ABOUT DIFFERENT THINGS: MORE THAN HALF THE DAYS

## 2018-07-10 ASSESSMENT — PATIENT HEALTH QUESTIONNAIRE - PHQ9: 5. POOR APPETITE OR OVEREATING: MORE THAN HALF THE DAYS

## 2018-07-10 NOTE — NURSING NOTE
Patient has had some anxiety and depression in the last few months and is here to follow up on it. Rut Rogers LPN ....................7/10/2018  4:01 PM

## 2018-07-10 NOTE — PROGRESS NOTES
SUBJECTIVE:   Nursing Notes:   Ken Rut METCALF., LPN  7/10/2018  4:01 PM  Unsigned  Patient has had some anxiety and depression in the last few months and is here to follow up on it. Rut Ken CARRANZA ....................7/10/2018  4:01 PM      Nato Bradley is a 33 year old female who presents to clinic today for a complaint of depression and anxiety.  She has had issues with depression since she was a teenager.  She recalls having been on Zoloft for a while when she was 14 or 15 years old.  She remembers having a lot of side effects with stopping this.  She cannot recall how well it worked.  She states that as a child her mom was an alcoholic and may have bipolar disorder.  She thinks she has 2 aunts that are probably bipolar as well.  She has never had this diagnosis.  She denies any manic episodes at this point.  She does state that when her depression gets very bad she does not want to do much of anything and feels her self isolating more.  She has a hard time concentrating, especially when people are talking to her and giving her details.  She has had a hard time remembering some of these details at times.  She has a hard time sleeping, especially falling asleep.  She has a 2-year-old who still gets up frequently during the night.  Her sleep is disrupted when she is able to sleep.  She has 4 children between the ages of 2 and 14.  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  Her  works at a local mine and has concerns that his job is threatened.  Therefore, there are financial worries as well.  She grew up in the [place] and does not have many close friends in the area.  She does not feel that she has a great social network for support.  She does not feel that she would be able to see anybody for counseling due to her demands as a stay-at-home mother.    HPI    I personally reviewed medications/allergies/history listed below:    Patient Active Problem List    Diagnosis  Date Noted     Depressive disorder 2006     Priority: Medium     Past Medical History:   Diagnosis Date     Personal history of other mental and behavioral disorders     No Comments Provided      Past Surgical History:   Procedure Laterality Date      SECTION      No Comments Provided     EXTRACTION(S) DENTAL      No Comments Provided     Family History   Problem Relation Age of Onset     Diabetes Mother      Diabetes     Hypertension Mother      Hypertension     Thyroid Disease Mother      Thyroid Disease     Thyroid Disease Sister      hashimoto's (dx 2018)     HEART DISEASE Maternal Grandmother      Heart Disease     Hypertension Maternal Grandmother      Hypertension     Lung Cancer Maternal Grandmother      ? cervical cancer in 20s.      HEART DISEASE Maternal Grandfather      Heart Disease     Prostate Cancer Maternal Grandfather      Cancer-prostate     Diabetes Maternal Grandfather      Diabetes     Hypertension Maternal Grandfather      Hypertension     Hypertension Maternal Aunt      Hypertension     Thyroid Disease Maternal Aunt      Thyroid Disease     Hypertension Paternal Aunt      Hypertension     Breast Cancer No family hx of      Cancer-breast     Colon Cancer No family hx of      Cancer-colon     Ovarian Cancer No family hx of      Cancer-ovarian     Other - See Comments No family hx of      Stroke     Blood Disease No family hx of      Blood Disease     Social History   Substance Use Topics     Smoking status: Never Smoker     Smokeless tobacco: Never Used     Alcohol use 0.0 oz/week      Comment: Alcoholic Drinks/day: Beer or wine     Social History     Social History Narrative    [place] [facility] [place]  Nato grew up in [place].  Tarun grew up in [place].       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 Negative for fever.   Neurological: Negative for seizures.   Psychiatric/Behavioral: Positive for decreased concentration, mood changes and sleep disturbance. Negative for suicidal ideas. The patient is nervous/anxious.         OBJECTIVE:     /70 (BP Location: Right arm, Patient Position: Sitting, Cuff Size: Adult Regular)  Pulse 80  Ht 5' 0.5\" (1.537 m)  Wt 137 lb (62.1 kg)  LMP 06/10/2018  BMI 26.32 kg/m2  Body mass index is 26.32 kg/(m^2).  Physical Exam   Constitutional: She appears well-developed.   Psychiatric: She has a normal mood and affect.       PHQ-9 SCORE 11/4/2015 3/2/2016 7/10/2018   Total Score 5 6 14     OMAR-7 SCORE 7/10/2018   Total Score 16       I personally reviewed results withpatient as listed below:   Diagnostic Test Results:  none     ASSESSMENT/PLAN:       ICD-10-CM    1. Anxiety and depression F41.8 buPROPion (WELLBUTRIN XL) 150 MG 24 hr tablet     traZODone (DESYREL) 50 MG tablet   2. Family history of thyroid disease Z83.49 TSH       1.  Trial of Wellbutrin  mg p.o. daily.  Trazodone 50 mg p.o. nightly as needed given for sleep.  She declines offer for referral for counseling right now as she feels that she would not have time to do this only.  Follow-up in 1 month, sooner if symptoms are worsening.  She contracts for safety.  2.  Check TSH again with family history of thyroid disease to make sure this is not adding to the issues with anxiety and depression as well.    Mariam De Los Santos MD  [facility] AND [facility]    "

## 2018-07-10 NOTE — MR AVS SNAPSHOT
"              After Visit Summary   7/10/2018    Nato Bradley    MRN: [id number]           Patient Information     Date Of Birth          1985        Visit Information        Provider Department      7/10/2018 4:00 PM Mariam De Los Santos MD [facility]        Today's Diagnoses     Anxiety and depression    -  1    Family history of thyroid disease           Follow-ups after your visit        Who to contact     If you have questions or need follow up information about today's clinic visit or your schedule please contact [facility] directly at [phone].  Normal or non-critical lab and imaging results will be communicated to you by [employer] Energy Resourceshart, letter or phone within 4 business days after the clinic has received the results. If you do not hear from us within 7 days, please contact the clinic through [employer] or phone. If you have a critical or abnormal lab result, we will notify you by phone as soon as possible.  Submit refill requests through [employer] or call your pharmacy and they will forward the refill request to us. Please allow 3 business days for your refill to be completed.          Additional Information About Your Visit        MyChart Information     [employer] gives you secure access to your electronic health record. If you see a primary care provider, you can also send messages to your care team and make appointments. If you have questions, please call your primary care clinic.  If you do not have a primary care provider, please call [phone] and they will assist you.        Care EveryWhere ID     This is your Care EveryWhere ID. This could be used by other organizations to access your [facility] medical records  [id number]        Your Vitals Were     Pulse Height Last Period BMI (Body Mass Index)          80 5' 0.5\" (1.537 m) 06/10/2018 26.32 kg/m2         Blood Pressure from Last 3 Encounters:   07/10/18 120/70   02/12/18 122/78   12/19/17 122/80    " Weight from Last 3 Encounters:   07/10/18 137 lb (62.1 kg)   02/12/18 145 lb (65.8 kg)   12/19/17 141 lb (64 kg)              We Performed the Following     TSH          Today's Medication Changes          These changes are accurate as of 7/10/18  4:35 PM.  If you have any questions, ask your nurse or doctor.               Start taking these medicines.        Dose/Directions    buPROPion 150 MG 24 hr tablet   Commonly known as:  WELLBUTRIN XL   Used for:  Anxiety and depression   Started by:  Mariam De Los Santos MD        Dose:  150 mg   Take 1 tablet (150 mg) by mouth every morning   Quantity:  90 tablet   Refills:  3       traZODone 50 MG tablet   Commonly known as:  DESYREL   Used for:  Anxiety and depression   Started by:  Mariam De Los Santos MD        Dose:  50 mg   Take 1 tablet (50 mg) by mouth nightly as needed for sleep   Quantity:  30 tablet   Refills:  11            Where to get your medicines      These medications were sent to [facility] [id number] [facility] - [place], [place] - [address]  [address] [facility] [address]     Phone:  [phone]     buPROPion 150 MG 24 hr tablet    traZODone 50 MG tablet                Primary Care Provider Office Phone # Fax #    Nick Velarde [phone]       [address] [facility] [address]        Equal Access to Services     [facility] AH: Hadii jennifer curry hadasho Soeboni, waaxda luqadaha, qaybta kaalmada ademirandada, steff norman . So [facility] [phone].    ATENCIÓN: Si habla español, tiene a peck disposición servicios gratuitos de asistencia lingüística. Mikaela al [phone].    We comply with applicable federal civil rights laws and [place] laws. We do not discriminate on the basis of race, color, national origin, age, disability, sex, sexual orientation, or gender identity.            Thank you!     Thank you for choosing [facility] AND \[facility]\""  for your care. Our goal is  always to provide you with excellent care. Hearing back from our patients is one way we can continue to improve our services. Please take a few minutes to complete the written survey that you may receive in the mail after your visit with us. Thank you!             Your Updated Medication List - Protect others around you: Learn how to safely use, store and throw away your medicines at www.disposemymeds.org.          This list is accurate as of 7/10/18  4:35 PM.  Always use your most recent med list.                   Brand Name Dispense Instructions for use Diagnosis    buPROPion 150 MG 24 hr tablet    WELLBUTRIN XL    90 tablet    Take 1 tablet (150 mg) by mouth every morning    Anxiety and depression       traZODone 50 MG tablet    DESYREL    30 tablet    Take 1 tablet (50 mg) by mouth nightly as needed for sleep    Anxiety and depression

## 2018-07-11 ASSESSMENT — ANXIETY QUESTIONNAIRES: GAD7 TOTAL SCORE: 16

## 2018-07-11 ASSESSMENT — PATIENT HEALTH QUESTIONNAIRE - PHQ9: SUM OF ALL RESPONSES TO PHQ QUESTIONS 1-9: 14

## 2018-08-08 ENCOUNTER — OFFICE VISIT (OUTPATIENT)
Dept: FAMILY MEDICINE | Facility: OTHER | Age: 33
End: 2018-08-08
Attending: PHYSICIAN ASSISTANT
Payer: COMMERCIAL

## 2018-08-08 VITALS
TEMPERATURE: 98.4 F | WEIGHT: 136.6 LBS | DIASTOLIC BLOOD PRESSURE: 80 MMHG | BODY MASS INDEX: 26.24 KG/M2 | HEART RATE: 68 BPM | SYSTOLIC BLOOD PRESSURE: 118 MMHG

## 2018-08-08 DIAGNOSIS — R05.9 COUGH: ICD-10-CM

## 2018-08-08 DIAGNOSIS — J01.00 ACUTE NON-RECURRENT MAXILLARY SINUSITIS: Primary | ICD-10-CM

## 2018-08-08 PROCEDURE — 99213 OFFICE O/P EST LOW 20 MIN: CPT | Performed by: PHYSICIAN ASSISTANT

## 2018-08-08 ASSESSMENT — PAIN SCALES - GENERAL: PAINLEVEL: MODERATE PAIN (4)

## 2018-08-08 NOTE — PATIENT INSTRUCTIONS
Acute sinusitis and cough  Start Augmentin oral tablet, take twice daily for 10 days. Use a probiotic while on this medicine. Take with food.   Warm compress, hot steamy shower  OTC decongestant (sudafed), OTC 3 day nasal spray - Afrin, OTC inhaled corticosteroid - Flonase, OTC antihistamine - cetirizine as directed, OTC Nasal sinus rinse.   Ibuprofen or tylenol as directed for discomfort or fever  Return to clinic if symptoms persist or worsen  Seek immediate care for    Facial pain or headache that gets worse    Stiff neck    Unusual drowsiness or confusion    Swelling of your forehead or eyelids    Vision problems, such as blurred or double vision    Fever of 100.4 F (38 C) or higher, or as directed by your healthcare provider    Seizure    Breathing problems    Symptoms don't go away in 10 days    Sinusitis (Antibiotic Treatment)    The sinuses are air-filled spaces within the bones of the face. They connect to the inside of the nose. Sinusitis is an inflammation of the tissue that lines the sinuses. Sinusitis can occur during a cold. It can also happen due to allergies to pollens and other particles in the air. Sinusitis can cause symptoms of sinus congestion and a feeling of fullness. A sinus infection causes fever, headache, and facial pain. There is often green or yellow fluid draining from the nose or into the back of the throat (post-nasal drip). You have been given antibiotics to treat this condition.  Home care    Take the full course of antibiotics as instructed. Do not stop taking them, even when you feel better.    Drink plenty of water, hot tea, and other liquids. This may help thin nasal mucus. It also may help your sinuses drain fluids.    Heat may help soothe painful areas of your face. Use a towel soaked in hot water. Or,  the shower and direct the warm spray onto your face. Using a vaporizer along with a menthol rub at night may also help soothe symptoms.     An expectorant with  guaifenesin may help thin nasal mucus and help your sinuses drain fluids.    You can use an over-the-counter decongestant, unless a similar medicine was prescribed to you. Nasal sprays work the fastest. Use one that contains phenylephrine or oxymetazoline. First blow your nose gently. Then use the spray. Do not use these medicines more often than directed on the label. If you do, your symptoms may get worse. You may also take pills that contain pseudoephedrine. Don t use products that combine multiple medicines. This is because side effects may be increased. Read labels. You can also ask the pharmacist for help. (People with high blood pressure should not use decongestants. They can raise blood pressure.)    Over-the-counter antihistamines may help if allergies contributed to your sinusitis.      Do not use nasal rinses or irrigation during an acute sinus infection, unless your healthcare provider tells you to. Rinsing may spread the infection to other areas in your sinuses.    Use acetaminophen or ibuprofen to control pain, unless another pain medicine was prescribed to you. If you have chronic liver or kidney disease or ever had a stomach ulcer, talk with your healthcare provider before using these medicines. (Aspirin should never be taken by anyone under age 18 who is ill with a fever. It may cause severe liver damage.)    Don't smoke. This can make symptoms worse.  Follow-up care  Follow up with your healthcare provider or our staff if you are better in 1 week.  When to seek medical advice  Call your healthcare provider if any of these occur:    Facial pain or headache that gets worse    Stiff neck    Unusual drowsiness or confusion    Swelling of your forehead or eyelids    Vision problems, such as blurred or double vision    Fever of 100.4 F (38 C) or higher, or as directed by your healthcare provider    Seizure    Breathing problems    Symptoms don't go away in 10 days  Prevention  Here are steps you can take  to help prevent an infection:    Keep good hand washing habits.    Don t have close contact with people who have sore throats, colds, or other upper respiratory infections.    Don t smoke, and stay away from secondhand smoke.    Stay up to date with of your vaccines.  Date Last Reviewed: 11/1/2017 2000-2017 [employer]. [address] [place] [address], [place] [address]. All rights reserved. This information is not intended as a substitute for professional medical care. Always follow your healthcare professional's instructions.

## 2018-08-08 NOTE — MR AVS SNAPSHOT
After Visit Summary   8/8/2018    Nato Bradley    MRN: [id number]           Patient Information     Date Of Birth          1985        Visit Information        Provider Department      8/8/2018 10:00 AM Meeta Cobb PA-C [facility]        Today's Diagnoses     Acute non-recurrent maxillary sinusitis    -  1    Cough          Care Instructions    Acute sinusitis and cough  Start Augmentin oral tablet, take twice daily for 10 days. Use a probiotic while on this medicine. Take with food.   Warm compress, hot steamy shower  OTC decongestant (sudafed), OTC 3 day nasal spray - Afrin, OTC inhaled corticosteroid - Flonase, OTC antihistamine - cetirizine as directed, OTC Nasal sinus rinse.   Ibuprofen or tylenol as directed for discomfort or fever  Return to clinic if symptoms persist or worsen  Seek immediate care for    Facial pain or headache that gets worse    Stiff neck    Unusual drowsiness or confusion    Swelling of your forehead or eyelids    Vision problems, such as blurred or double vision    Fever of 100.4 F (38 C) or higher, or as directed by your healthcare provider    Seizure    Breathing problems    Symptoms don't go away in 10 days    Sinusitis (Antibiotic Treatment)    The sinuses are air-filled spaces within the bones of the face. They connect to the inside of the nose. Sinusitis is an inflammation of the tissue that lines the sinuses. Sinusitis can occur during a cold. It can also happen due to allergies to pollens and other particles in the air. Sinusitis can cause symptoms of sinus congestion and a feeling of fullness. A sinus infection causes fever, headache, and facial pain. There is often green or yellow fluid draining from the nose or into the back of the throat (post-nasal drip). You have been given antibiotics to treat this condition.  Home care    Take the full course of antibiotics as instructed. Do not stop taking them, even when you feel  better.    Drink plenty of water, hot tea, and other liquids. This may help thin nasal mucus. It also may help your sinuses drain fluids.    Heat may help soothe painful areas of your face. Use a towel soaked in hot water. Or,  the shower and direct the warm spray onto your face. Using a vaporizer along with a menthol rub at night may also help soothe symptoms.     An expectorant with guaifenesin may help thin nasal mucus and help your sinuses drain fluids.    You can use an over-the-counter decongestant, unless a similar medicine was prescribed to you. Nasal sprays work the fastest. Use one that contains phenylephrine or oxymetazoline. First blow your nose gently. Then use the spray. Do not use these medicines more often than directed on the label. If you do, your symptoms may get worse. You may also take pills that contain pseudoephedrine. Don t use products that combine multiple medicines. This is because side effects may be increased. Read labels. You can also ask the pharmacist for help. (People with high blood pressure should not use decongestants. They can raise blood pressure.)    Over-the-counter antihistamines may help if allergies contributed to your sinusitis.      Do not use nasal rinses or irrigation during an acute sinus infection, unless your healthcare provider tells you to. Rinsing may spread the infection to other areas in your sinuses.    Use acetaminophen or ibuprofen to control pain, unless another pain medicine was prescribed to you. If you have chronic liver or kidney disease or ever had a stomach ulcer, talk with your healthcare provider before using these medicines. (Aspirin should never be taken by anyone under age 18 who is ill with a fever. It may cause severe liver damage.)    Don't smoke. This can make symptoms worse.  Follow-up care  Follow up with your healthcare provider or our staff if you are better in 1 week.  When to seek medical advice  Call your healthcare provider if  any of these occur:    Facial pain or headache that gets worse    Stiff neck    Unusual drowsiness or confusion    Swelling of your forehead or eyelids    Vision problems, such as blurred or double vision    Fever of 100.4 F (38 C) or higher, or as directed by your healthcare provider    Seizure    Breathing problems    Symptoms don't go away in 10 days  Prevention  Here are steps you can take to help prevent an infection:    Keep good hand washing habits.    Don t have close contact with people who have sore throats, colds, or other upper respiratory infections.    Don t smoke, and stay away from secondhand smoke.    Stay up to date with of your vaccines.  Date Last Reviewed: 11/1/2017 2000-2017 [employer]. [address] [place] [address], [place] [address]. All rights reserved. This information is not intended as a substitute for professional medical care. Always follow your healthcare professional's instructions.                Follow-ups after your visit        Your next 10 appointments already scheduled     Aug 14, 2018  3:45 PM CDT   Office Visit with Mariam De Los Santos MD   [facility] ([facility])    [address]  [place] [id number] [phone]           Bring a current list of meds and any records pertaining to this visit. For Physicals, please bring immunization records and any forms needing to be filled out. Please arrive 10 minutes early to complete paperwork.              Who to contact     If you have questions or need follow up information about today's clinic visit or your schedule please contact [facility] directly at [phone].  Normal or non-critical lab and imaging results will be communicated to you by MyChart, letter or phone within 4 business days after the clinic has received the results. If you do not hear from us within 7 days, please contact the clinic through MyChart or phone. If you have  a critical or abnormal lab result, we will notify you by phone as soon as possible.  Submit refill requests through [employer] or call your pharmacy and they will forward the refill request to us. Please allow 3 business days for your refill to be completed.          Additional Information About Your Visit        [employer] Information     [employer] gives you secure access to your electronic health record. If you see a primary care provider, you can also send messages to your care team and make appointments. If you have questions, please call your primary care clinic.  If you do not have a primary care provider, please call [phone] and they will assist you.        Care EveryWhere ID     This is your Care EveryWhere ID. This could be used by other organizations to access your [facility] medical records  [id number]        Your Vitals Were     Pulse Temperature Breastfeeding? BMI (Body Mass Index)          68 98.4  F (36.9  C) (Tympanic) No 26.24 kg/m2         Blood Pressure from Last 3 Encounters:   08/08/18 118/80   07/10/18 120/70   02/12/18 122/78    Weight from Last 3 Encounters:   08/08/18 136 lb 9.6 oz (62 kg)   07/10/18 137 lb (62.1 kg)   02/12/18 145 lb (65.8 kg)              Today, you had the following     No orders found for display         Today's Medication Changes          These changes are accurate as of 8/8/18 10:41 AM.  If you have any questions, ask your nurse or doctor.               Start taking these medicines.        Dose/Directions    amoxicillin-clavulanate 875-125 MG per tablet   Commonly known as:  AUGMENTIN   Used for:  Acute non-recurrent maxillary sinusitis   Started by:  Meeta Cobb PA-C        Dose:  1 tablet   Take 1 tablet by mouth 2 times daily for 10 days   Quantity:  20 tablet   Refills:  0            Where to get your medicines      These medications were sent to [id number] - [place] - [address], [facility]  [address]     Phone:  [phone]     amoxicillin-clavulanate 875-125 MG per tablet                Primary Care Provider Office Phone # Fax #    Nick Velarde [phone] [address]   [facility] [address]        Equal Access to Services     [facility] : Hadii aad ku hadasho Soomaali, waaxda luqadaha, qaybta kaalmada adeegyada, waxyudith delcid hayjadenn aderuma moramaria antonialay norman . So [facility] [phone].    ATENCIÓN: Si habla español, tiene a peck disposición servicios gratuitos de asistencia lingüística. [facility] [phone].    We comply with applicable federal civil rights laws and [place] laws. We do not discriminate on the basis of race, color, national origin, age, disability, sex, sexual orientation, or gender identity.            Thank you!     Thank you for choosing [facility]  for your care. Our goal is always to provide you with excellent care. Hearing back from our patients is one way we can continue to improve our services. Please take a few minutes to complete the written survey that you may receive in the mail after your visit with us. Thank you!             Your Updated Medication List - Protect others around you: Learn how to safely use, store and throw away your medicines at www.disposemymeds.org.          This list is accurate as of 8/8/18 10:41 AM.  Always use your most recent med list.                   Brand Name Dispense Instructions for use Diagnosis    amoxicillin-clavulanate 875-125 MG per tablet    AUGMENTIN    20 tablet    Take 1 tablet by mouth 2 times daily for 10 days    Acute non-recurrent maxillary sinusitis       buPROPion 150 MG 24 hr tablet    WELLBUTRIN XL    90 tablet    Take 1 tablet (150 mg) by mouth every morning    Anxiety and depression       traZODone 50 MG tablet    DESYREL    30 tablet    Take 1 tablet (50 mg) by mouth nightly as needed for sleep    Anxiety and depression

## 2018-08-08 NOTE — NURSING NOTE
"Patient presents with cough productive green phlegm, sore throat, green nasal discharge, sweating, lack of sleep, headaches for 1 week. Monserrat Perez LPN .............8/8/2018  10:15 AM  Chief Complaint   Patient presents with     Cough       Initial /80 (BP Location: Right arm, Patient Position: Sitting, Cuff Size: Adult Regular)  Pulse 68  Temp 98.4  F (36.9  C) (Tympanic)  Wt 136 lb 9.6 oz (62 kg)  Breastfeeding? No  BMI 26.24 kg/m2 Estimated body mass index is 26.24 kg/(m^2) as calculated from the following:    Height as of 7/10/18: 5' 0.5\" (1.537 m).    Weight as of this encounter: 136 lb 9.6 oz (62 kg).  Medication Reconciliation: complete    Monserrat Perez LPN  "

## 2018-08-22 ENCOUNTER — MYC MEDICAL ADVICE (OUTPATIENT)
Dept: FAMILY MEDICINE | Facility: OTHER | Age: 33
End: 2018-08-22

## 2018-08-22 NOTE — TELEPHONE ENCOUNTER
Patient aware Mariam De Los Santos MD out until Monday, ok to wait.  Ila Reddy LPN .............8/22/2018     12:14 PM

## 2018-08-27 NOTE — TELEPHONE ENCOUNTER
Excessive sweating is a possible side effect with Wellbutrin.  You may offer her a same day spot in my schedule tomorrow if she wants to discuss switching to a different medication.  Mariam De Los Santos MD on 8/27/2018 at 12:58 PM

## 2018-08-28 ENCOUNTER — MYC MEDICAL ADVICE (OUTPATIENT)
Dept: FAMILY MEDICINE | Facility: OTHER | Age: 33
End: 2018-08-28

## 2018-10-26 ENCOUNTER — OFFICE VISIT (OUTPATIENT)
Dept: FAMILY MEDICINE | Facility: OTHER | Age: 33
End: 2018-10-26
Attending: NURSE PRACTITIONER
Payer: COMMERCIAL

## 2018-10-26 VITALS — HEART RATE: 74 BPM | DIASTOLIC BLOOD PRESSURE: 80 MMHG | SYSTOLIC BLOOD PRESSURE: 120 MMHG

## 2018-10-26 DIAGNOSIS — F32.A ANXIETY AND DEPRESSION: ICD-10-CM

## 2018-10-26 DIAGNOSIS — F41.9 ANXIETY AND DEPRESSION: ICD-10-CM

## 2018-10-26 PROCEDURE — 99214 OFFICE O/P EST MOD 30 MIN: CPT | Performed by: NURSE PRACTITIONER

## 2018-10-26 RX ORDER — HYDROXYZINE HYDROCHLORIDE 25 MG/1
25-50 TABLET, FILM COATED ORAL EVERY 6 HOURS PRN
Qty: 60 TABLET | Refills: 1 | Status: SHIPPED | OUTPATIENT
Start: 2018-10-26 | End: 2019-08-16

## 2018-10-26 RX ORDER — BUPROPION HYDROCHLORIDE 300 MG/1
300 TABLET ORAL EVERY MORNING
Qty: 30 TABLET | Refills: 3 | Status: SHIPPED | OUTPATIENT
Start: 2018-10-26 | End: 2019-02-08

## 2018-10-26 ASSESSMENT — PAIN SCALES - GENERAL: PAINLEVEL: NO PAIN (0)

## 2018-10-26 ASSESSMENT — PATIENT HEALTH QUESTIONNAIRE - PHQ9: SUM OF ALL RESPONSES TO PHQ QUESTIONS 1-9: 20

## 2018-10-26 NOTE — PATIENT INSTRUCTIONS
Increase Wellbutrin xl to 300 mg daily  May use hydroxyzine 1-2 tablets 3 times daily as needed for anxiety and sleep  Consider counseling  Talk to Dawn at the [employer] about scholarship for membership  Try to go the [employer] at least a couple times weekly  Follow up in 1 month

## 2018-10-26 NOTE — MR AVS SNAPSHOT
After Visit Summary   10/26/2018    Nato Bradley    MRN: [id number]           Patient Information     Date Of Birth          1985        Visit Information        Provider Department      10/26/2018 2:00 PM Kenzie Mosley APRN CNP [facility]        Today's Diagnoses     Anxiety and depression          Care Instructions    Increase Wellbutrin xl to 300 mg daily  May use hydroxyzine 1-2 tablets 3 times daily as needed for anxiety and sleep  Consider counseling  Talk to Dawn at the [facility] about scholarship for membership  Try to go the [facility] at least a couple times weekly  Follow up in 1 month          Follow-ups after your visit        Who to contact     If you have questions or need follow up information about today's clinic visit or your schedule please contact [facility] directly at [phone].  Normal or non-critical lab and imaging results will be communicated to you by [employer], letter or phone within 4 business days after the clinic has received the results. If you do not hear from us within 7 days, please contact the clinic through [employer] or phone. If you have a critical or abnormal lab result, we will notify you by phone as soon as possible.  Submit refill requests through [employer] or call your pharmacy and they will forward the refill request to us. Please allow 3 business days for your refill to be completed.          Additional Information About Your Visit        [employer] Information     [employer] gives you secure access to your electronic health record. If you see a primary care provider, you can also send messages to your care team and make appointments. If you have questions, please call your primary care clinic.  If you do not have a primary care provider, please call [phone] and they will assist you.        Care EveryWhere ID     This is your Care EveryWhere ID. This could be used by other organizations to access your [place]  medical records  [id number]        Your Vitals Were     Pulse                   74            Blood Pressure from Last 3 Encounters:   10/26/18 120/80   08/08/18 118/80   07/10/18 120/70    Weight from Last 3 Encounters:   08/08/18 136 lb 9.6 oz (62 kg)   07/10/18 137 lb (62.1 kg)   02/12/18 145 lb (65.8 kg)              Today, you had the following     No orders found for display         Today's Medication Changes          These changes are accurate as of 10/26/18  2:27 PM.  If you have any questions, ask your nurse or doctor.               Start taking these medicines.        Dose/Directions    hydrOXYzine 25 MG tablet   Commonly known as:  ATARAX   Used for:  Anxiety and depression   Started by:  Kenzie Mosley APRN CNP        Dose:  25-50 mg   Take 1-2 tablets (25-50 mg) by mouth every 6 hours as needed for anxiety   Quantity:  60 tablet   Refills:  1         These medicines have changed or have updated prescriptions.        Dose/Directions    buPROPion 300 MG 24 hr tablet   Commonly known as:  WELLBUTRIN XL   This may have changed:    - medication strength  - how much to take   Used for:  Anxiety and depression   Changed by:  Kenzie Mosley APRN CNP        Dose:  300 mg   Take 1 tablet (300 mg) by mouth every morning   Quantity:  30 tablet   Refills:  3            Where to get your medicines      These medications were sent to [email or web address] - [place], [place] - [address]  [address], [facility] [address]     Phone:  [phone]     buPROPion 300 MG 24 hr tablet    hydrOXYzine 25 MG tablet                Primary Care Provider Office Phone # Fax #    Nick Velarde [phone] [address]  [facility] [address]        Equal Access to Services     FRANSISCA SERRANO AH: Juanita Schaeffer, wamariia ron, qaybta kaaligor pittman, steff cohen. So [facility] [phone].    ATENCIÓN: Si  justin davis, tiene a peck disposición servicios gratuitos de asistencia lingüística. Mikaela clifford [phone].    We comply with applicable federal civil rights laws and [place] laws. We do not discriminate on the basis of race, color, national origin, age, disability, sex, sexual orientation, or gender identity.            Thank you!     Thank you for choosing [facility]""  for your care. Our goal is always to provide you with excellent care. Hearing back from our patients is one way we can continue to improve our services. Please take a few minutes to complete the written survey that you may receive in the mail after your visit with us. Thank you!             Your Updated Medication List - Protect others around you: Learn how to safely use, store and throw away your medicines at www.disposemymeds.org.          This list is accurate as of 10/26/18  2:27 PM.  Always use your most recent med list.                   Brand Name Dispense Instructions for use Diagnosis    buPROPion 300 MG 24 hr tablet    WELLBUTRIN XL    30 tablet    Take 1 tablet (300 mg) by mouth every morning    Anxiety and depression       hydrOXYzine 25 MG tablet    ATARAX    60 tablet    Take 1-2 tablets (25-50 mg) by mouth every 6 hours as needed for anxiety    Anxiety and depression       traZODone 50 MG tablet    DESYREL    30 tablet    Take 1 tablet (50 mg) by mouth nightly as needed for sleep    Anxiety and depression

## 2018-10-26 NOTE — PROGRESS NOTES
HPI:    Nato Bradley is a 33 year old female who presents to clinic today for medication management. She has been on Wellbutrin for a couple months. Feels this is not helping. Feels like it may have been helping but it is not anymore. She feels trouble with focus, not wanting to do anything. Less patients with the kids. She has had some losses recently. She is not sleeping well, has 4 kids. Not able to take trazodone as she will sleep too soundly and then will not wake up with her children when they wake. She has hx of sertraline in the past. 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     Past Medical History:   Diagnosis Date     Personal history of other mental and behavioral disorders     No Comments Provided       Current Outpatient Prescriptions   Medication Sig Dispense Refill     buPROPion (WELLBUTRIN XL) 300 MG 24 hr tablet Take 1 tablet (300 mg) by mouth every morning 30 tablet 3     hydrOXYzine (ATARAX) 25 MG tablet Take 1-2 tablets (25-50 mg) by mouth every 6 hours as needed for anxiety 60 tablet 1     traZODone (DESYREL) 50 MG tablet Take 1 tablet (50 mg) by mouth nightly as needed for sleep 30 tablet 11       No Known Allergies    ROS:  Pertinent positives and negatives are noted in HPI.    EXAM:  General appearance: well appearing female, in no acute distress  Respiratory: clear to auscultation bilaterally  Cardiac: RRR with no murmurs  Psychological: normal affect, alert and pleasant. Tearful at times    PHQ Depression Screen  PHQ-9 SCORE 3/2/2016 7/10/2018 10/26/2018   Total Score 6 14 20     OMAR-7 SCORE 7/10/2018   Total Score 16       ASSESSMENT AND PLAN:    1. Anxiety and depression      She seems to be resistant to options offered today including counseling. She states she has no time to go to therapy as she does not have a . Her  is not able to help with this. She has no family in the area, is not close to her husbands family. No friends. She appears to be very overwhelmed. Declines  any SI/HI. Will increase Wellbutrin to 300 mg. Given hydroxyzine as needed for anxiety. Recommend f/u in 1 month. She did get membership to [facility] but feels guilty about the cost. Encouraged talking to  about scholarships. I think she would benefit from exercise and time away from children by using  at [facility].     Discussed side effects which include but are not limited to headache, stomachache, sleep disturbance, appetite suppression, emotional lability. Also discussed black box warning on all SSRI medication for increased thoughts of suicidality or self harm in the initial phase of treatment. If any thoughts of self harm/suicide, family is asked to seek medical care or call 911 or First Call for Help/Crisis Team for evaluation.  Follow up for any new or concerning side effects or any other questions.     I spent approximately 35 minutes with the patient (exclusive of separately billed services/procedures), with greater than 50% spent in counseling, prognosis, risks and benefits of management or follow-up.  Reviewed importance of compliance with chosen treatment options and follow-up.  Risk factor reduction and patient education and coordinating care, establishing and/or reviewing the patient's medical record also completed during today's exam .        Kenzie Mosley..................10/26/2018 2:08 PM

## 2019-01-31 DIAGNOSIS — F41.9 ANXIETY AND DEPRESSION: ICD-10-CM

## 2019-01-31 DIAGNOSIS — F32.A ANXIETY AND DEPRESSION: ICD-10-CM

## 2019-01-31 RX ORDER — BUPROPION HYDROCHLORIDE 150 MG/1
150 TABLET ORAL EVERY MORNING
Qty: 90 TABLET | Refills: 1 | OUTPATIENT
Start: 2019-01-31

## 2019-01-31 NOTE — TELEPHONE ENCOUNTER
Dose changed and filled 10/26/2018 #30 x 3 to [facility]. Pharmacy alerted. Unable to complete prescription refill per RNMedication Refill Policy.................... Milka Kaiser ....................  1/31/2019   11:23 AM    buPROPion (WELLBUTRIN XL) 300 MG 24 hr tablet 30 tablet 3 10/26/2018  --   Sig - Route: Take 1 tablet (300 mg) by mouth every morning - Oral   Sent to pharmacy as: buPROPion (WELLBUTRIN XL) 300 MG 24 hr tablet   Class: E-Prescribe   Order: [id number]   E-Prescribing Status: Receipt confirmed by pharmacy (10/26/2018  2:25 PM CDT)   Printout Tracking     External Result Report   Pharmacy     [id number] - [place], [place] - [address]

## 2019-02-08 DIAGNOSIS — F41.9 ANXIETY AND DEPRESSION: ICD-10-CM

## 2019-02-08 DIAGNOSIS — F32.A ANXIETY AND DEPRESSION: ICD-10-CM

## 2019-02-08 RX ORDER — BUPROPION HYDROCHLORIDE 300 MG/1
300 TABLET ORAL EVERY MORNING
Qty: 30 TABLET | Refills: 3 | Status: SHIPPED | OUTPATIENT
Start: 2019-02-08 | End: 2019-02-21

## 2019-02-08 NOTE — TELEPHONE ENCOUNTER
Message left for patient requesting a return to remind her that she is over due for a follow up OV. Will route to prescriber for consideration in the interim.     Wellbutrin 300 mg-dose increase from 150 mg to 300 mg at 10/26/2018 OV   Last Written Prescription Date: 10/26/2018  Last Fill Quantity: 30,   # refills: 3  Last Office Visit: 10/26/2018 with IZABEL Mosley- Follow up in 1 month    Future Office visit:       Routing refill request to provider for review/approval.    Unable to complete prescription refill per RNMedication Refill Policy.................... Milka Kaiser ....................  2/8/2019   1:35 PM

## 2019-02-21 ENCOUNTER — OFFICE VISIT (OUTPATIENT)
Dept: FAMILY MEDICINE | Facility: OTHER | Age: 34
End: 2019-02-21
Attending: PHYSICIAN ASSISTANT
Payer: COMMERCIAL

## 2019-02-21 ENCOUNTER — TELEPHONE (OUTPATIENT)
Dept: FAMILY MEDICINE | Facility: OTHER | Age: 34
End: 2019-02-21

## 2019-02-21 VITALS
DIASTOLIC BLOOD PRESSURE: 80 MMHG | WEIGHT: 140 LBS | HEIGHT: 60 IN | SYSTOLIC BLOOD PRESSURE: 122 MMHG | BODY MASS INDEX: 27.48 KG/M2 | RESPIRATION RATE: 18 BRPM | HEART RATE: 60 BPM

## 2019-02-21 DIAGNOSIS — Z23 NEEDS FLU SHOT: ICD-10-CM

## 2019-02-21 DIAGNOSIS — N89.8 VAGINAL DISCHARGE: ICD-10-CM

## 2019-02-21 DIAGNOSIS — Z01.419 PAP TEST, AS PART OF ROUTINE GYNECOLOGICAL EXAMINATION: ICD-10-CM

## 2019-02-21 DIAGNOSIS — Z13.29 SCREENING FOR THYROID DISORDER: ICD-10-CM

## 2019-02-21 DIAGNOSIS — F32.A ANXIETY AND DEPRESSION: ICD-10-CM

## 2019-02-21 DIAGNOSIS — B96.89 BV (BACTERIAL VAGINOSIS): Primary | ICD-10-CM

## 2019-02-21 DIAGNOSIS — Z00.00 ROUTINE HISTORY AND PHYSICAL EXAMINATION OF ADULT: Primary | ICD-10-CM

## 2019-02-21 DIAGNOSIS — N76.0 BV (BACTERIAL VAGINOSIS): Primary | ICD-10-CM

## 2019-02-21 DIAGNOSIS — F41.9 ANXIETY AND DEPRESSION: ICD-10-CM

## 2019-02-21 LAB
SPECIMEN SOURCE: ABNORMAL
T3 SERPL-MCNC: 119 NG/DL (ref 87–178)
T4 FREE SERPL-MCNC: 0.66 NG/DL (ref 0.6–1.6)
TSH SERPL DL<=0.05 MIU/L-ACNC: 0.85 IU/ML (ref 0.34–5.6)
WET PREP SPEC: ABNORMAL

## 2019-02-21 PROCEDURE — 99395 PREV VISIT EST AGE 18-39: CPT | Mod: 25 | Performed by: PHYSICIAN ASSISTANT

## 2019-02-21 PROCEDURE — 88142 CYTOPATH C/V THIN LAYER: CPT | Performed by: PHYSICIAN ASSISTANT

## 2019-02-21 PROCEDURE — G0123 SCREEN CERV/VAG THIN LAYER: HCPCS | Performed by: PHYSICIAN ASSISTANT

## 2019-02-21 PROCEDURE — 36415 COLL VENOUS BLD VENIPUNCTURE: CPT | Performed by: PHYSICIAN ASSISTANT

## 2019-02-21 PROCEDURE — 87210 SMEAR WET MOUNT SALINE/INK: CPT | Performed by: PHYSICIAN ASSISTANT

## 2019-02-21 PROCEDURE — 84439 ASSAY OF FREE THYROXINE: CPT | Performed by: PHYSICIAN ASSISTANT

## 2019-02-21 PROCEDURE — 84443 ASSAY THYROID STIM HORMONE: CPT | Performed by: PHYSICIAN ASSISTANT

## 2019-02-21 PROCEDURE — 90471 IMMUNIZATION ADMIN: CPT | Performed by: PHYSICIAN ASSISTANT

## 2019-02-21 PROCEDURE — 87624 HPV HI-RISK TYP POOLED RSLT: CPT | Performed by: PHYSICIAN ASSISTANT

## 2019-02-21 PROCEDURE — 90686 IIV4 VACC NO PRSV 0.5 ML IM: CPT | Performed by: PHYSICIAN ASSISTANT

## 2019-02-21 PROCEDURE — 84480 ASSAY TRIIODOTHYRONINE (T3): CPT | Performed by: PHYSICIAN ASSISTANT

## 2019-02-21 RX ORDER — BUSPIRONE HYDROCHLORIDE 5 MG/1
TABLET ORAL
Qty: 120 TABLET | Refills: 3 | Status: SHIPPED | OUTPATIENT
Start: 2019-02-21 | End: 2019-05-06

## 2019-02-21 RX ORDER — METRONIDAZOLE 500 MG/1
500 TABLET ORAL 2 TIMES DAILY
Qty: 14 TABLET | Refills: 0 | Status: SHIPPED | OUTPATIENT
Start: 2019-02-21 | End: 2019-04-08

## 2019-02-21 RX ORDER — BUPROPION HYDROCHLORIDE 300 MG/1
300 TABLET ORAL EVERY MORNING
Qty: 90 TABLET | Refills: 3 | Status: SHIPPED | OUTPATIENT
Start: 2019-02-21 | End: 2019-08-16

## 2019-02-21 ASSESSMENT — ANXIETY QUESTIONNAIRES
6. BECOMING EASILY ANNOYED OR IRRITABLE: NEARLY EVERY DAY
2. NOT BEING ABLE TO STOP OR CONTROL WORRYING: SEVERAL DAYS
GAD7 TOTAL SCORE: 14
5. BEING SO RESTLESS THAT IT IS HARD TO SIT STILL: NOT AT ALL
1. FEELING NERVOUS, ANXIOUS, OR ON EDGE: NEARLY EVERY DAY
3. WORRYING TOO MUCH ABOUT DIFFERENT THINGS: NEARLY EVERY DAY
7. FEELING AFRAID AS IF SOMETHING AWFUL MIGHT HAPPEN: SEVERAL DAYS

## 2019-02-21 ASSESSMENT — MIFFLIN-ST. JEOR: SCORE: 1261.54

## 2019-02-21 ASSESSMENT — PATIENT HEALTH QUESTIONNAIRE - PHQ9
SUM OF ALL RESPONSES TO PHQ QUESTIONS 1-9: 12
5. POOR APPETITE OR OVEREATING: NEARLY EVERY DAY

## 2019-02-21 NOTE — NURSING NOTE
Chief Complaint   Patient presents with     Physical         Medication Reconciliation: complete    Alexus Jane, LPN

## 2019-02-21 NOTE — NURSING NOTE
Prior to injection, verified patient identity using patient's name and date of birth.  Due to injection administration, patient instructed to remain in clinic for 15 minutes  afterwards, and to report any adverse reaction to me immediately.    FLU    Drug Amount Wasted:  None.      Mounika Jane LPN ...... 2/21/2019 4:21 PM

## 2019-02-21 NOTE — PATIENT INSTRUCTIONS
"Healthy Strategies  1. Eat at least 3 meals a day and never skip breakfast.  2. Eat more slowly.  3. Decrease portion size.  4. Provide structure by using meal replacement bars or shakes, and/or low calorie frozen meals.  5. For good nutrition incorporate fruit, vegetables, whole grains, lean protein, and low-fat dairy.  6. Remove trigger foods from yourenvironment to avoid impulse eating.  7. Increase physical activity: get a pedometer and aim for 10,000 steps a day or 30-35 minutes of activity 5 days per week.  8. Weigh yourself daily or at least weekly.  9. Keep a record of what you eat and your activity.  10. Establish a support system such as afriend, group or program.    11. Read Dax Mayers's \"Eat to Live\". Remember it is important to have a minimum of 1200 calories a day, okay to use olive oil, 40 grams of fiber daily. No more than two servings (the size of your palm) of red meat a week.     Please consider the following general health recommendations:    Schedule a mammogram annually starting at the age of 40 years old unless recommended earlier by your primary care provider. Come for a general exam once yearly.    Eat a quality diet (generally, low in simple sugars, starches, cholesterol and saturated fat.)    Please get 1500 mg of calcium in divided doses with 1500 units vitamin D in your diet daily. Take supplements as needed to obtain full recommended amounts.     Stay physically active. Regular walking or other exercise is one of the best ways to minimize pain of arthritis; maintain independence and mobility; maintain bone strength; maintain conditioning of your heart. Find something you enjoy and a friend to do it with you.    Maintain ideal weight. Your Body mass index is Body mass index is 27.34 kg/m .. Generally a BMI of 20-25 is considered ideal. Overweight is defined as 25-30, obese is 30-35 and markedly obese is greater than 35.    Apply sun block (SPF 25 or greater) on exposed skin anytime you " are out in the sun to prevent skin cancer.     Wear a seatbelt whenever you are in a car.    Consider a bone density study every 2-5 years to see if you are at increased risk for fracture. If you have osteoporosis, medicine to strengthen your bones can significantly reduce your risk of fracture, back pain, and loss of height.    Colonoscopy (an exam of the colon) is recommended every 10 years after the age of 50 to screen for colon cancer. (More often if you are at increased risk.)    Obtain a flu shot every fall.    You should have a tetanus booster at least once every 10 years.    Check blood sugar annually. Cholesterol annually unless you have had a normal level when last checked within 5 years.     I recommend that you have a general physical exam every year. You should have a pap test every 3 years between the ages of 21 and 30 and every 3-5 years between the ages of 30 and 65 depending on your test unless you have had previous abnormal pap smears, (in these cases the exams and PAP's should be done on a schedule as recommended by your primary care provider). If you have had hysterectomy in the past, your future Pap plan may be different.

## 2019-02-21 NOTE — PROGRESS NOTES
Nursing Notes:   Alexsu Jane LPN  2/21/2019  3:33 PM  Signed  Chief Complaint   Patient presents with     Physical         Medication Reconciliation: complete    TERE Juares Jacqueline Y., LPN  2/21/2019  4:21 PM  Signed  Prior to injection, verified patient identity using patient's name and date of birth.  Due to injection administration, patient instructed to remain in clinic for 15 minutes  afterwards, and to report any adverse reaction to me immediately.    FLU    Drug Amount Wasted:  None.      Mounika Buck CARRANZA ...... 2/21/2019 4:21 PM      ANNUAL PHYSICAL - FEMALE    HPI: Nato ROSALINE Bradley who presents for a yearly exam.  Concerns include: Taking wellbutrin.  Tolerating the medication well.  No side effects noted.  History of being on Zoloft in the past.  Did not tolerate the medication well.  Patient was previously given a prescription for hydroxyzine.  She does not like using this medication as it makes her tired.  Hard to take care of her children when she is taking this medication.  Wondering what other options are.  Would prefer to stay on Wellbutrin at this time.  No suicidal or homicidal ideation.  Patient worries a lot.  Recently dealt with the death of a grandparent she was very close with.    Thyroid disorders in the family.  She would like her labs completed.  Normal screening in the past.    Patient would like to have a vaginal wet prep completed today to rule out yeast or bacteria concerns.  No STD concerns.  No pregnancy concerns.    Patient's last menstrual period was 02/13/2019.   Contraception: None  Risk for STI?:  No  Last pap: 2/12/2018 - nl pap and positive HPV, needs repeat today, no vaginal concerns at this time.  Any hx of abnormal paps:  no  FH of early CA?:  Family history  Cholesterol/DM concerns/screening: Up-to-date  Tobacco?: no  Calcium intake: yes  DEXA: na  Last mammo: na  Colonoscopy: na  Immunizations: need flu shot    Patient  Active Problem List    Diagnosis Date Noted     Anxiety and depression 2019     Priority: Medium     Depressive disorder 2006     Priority: Medium       Past Medical History:   Diagnosis Date     Personal history of other mental and behavioral disorders     No Comments Provided       Past Surgical History:   Procedure Laterality Date      SECTION      No Comments Provided     EXTRACTION(S) DENTAL      No Comments Provided       Family History   Problem Relation Age of Onset     Diabetes Mother         Diabetes     Hypertension Mother         Hypertension     Thyroid Disease Mother         Thyroid Disease     Thyroid Disease Sister         hashimoto's (dx 2018)     Heart Disease Maternal Grandmother         Heart Disease     Hypertension Maternal Grandmother         Hypertension     Lung Cancer Maternal Grandmother         ? cervical cancer in 20s.      Heart Disease Maternal Grandfather         Heart Disease     Prostate Cancer Maternal Grandfather         Cancer-prostate     Diabetes Maternal Grandfather         Diabetes     Hypertension Maternal Grandfather         Hypertension     Hypertension Maternal Aunt         Hypertension     Thyroid Disease Maternal Aunt         Thyroid Disease     Hypertension Paternal Aunt         Hypertension     Breast Cancer No family hx of         Cancer-breast     Colon Cancer No family hx of         Cancer-colon     Ovarian Cancer No family hx of         Cancer-ovarian     Other - See Comments No family hx of         Stroke     Blood Disease No family hx of         Blood Disease       Social History     Tobacco Use     Smoking status: Never Smoker     Smokeless tobacco: Never Used   Substance Use Topics     Alcohol use: Yes     Alcohol/week: 0.0 oz     Comment: occasional       Current Outpatient Medications   Medication Sig Dispense Refill     buPROPion (WELLBUTRIN XL) 300 MG 24 hr tablet Take 1 tablet (300 mg) by mouth every morning 90 tablet 3     busPIRone  (BUSPAR) 5 MG tablet Take 1 tablet (5 mg) by mouth 2 times daily for 7 days, THEN 2 tablets (10 mg) 2 times daily. 120 tablet 3     hydrOXYzine (ATARAX) 25 MG tablet Take 1-2 tablets (25-50 mg) by mouth every 6 hours as needed for anxiety 60 tablet 1     traZODone (DESYREL) 50 MG tablet Take 1 tablet (50 mg) by mouth nightly as needed for sleep 30 tablet 11     metroNIDAZOLE (FLAGYL) 500 MG tablet Take 1 tablet (500 mg) by mouth 2 times daily for 7 days Do not drink alcohol with the medication! 14 tablet 0       No Known Allergies    REVIEW OF SYSTEMS:  Refer to HPI.    PHYSICAL EXAM:  /80 (BP Location: Right arm, Patient Position: Sitting, Cuff Size: Adult Regular)   Pulse 60   Resp 18   Ht 1.524 m (5')   Wt 63.5 kg (140 lb)   LMP 02/13/2019   BMI 27.34 kg/m    CONSTITUTIONAL:  Alert,cooperative, NAD.  EYES: No scleral icterus.  PERRLA.  Conjunctiva clear.  ENT/MOUTH: External ears and nose normal.  TMs normal.  Moist mucous membranes. Oropharynx clear.    ENDO: No thyromegaly or thyroidnodules.  LYMPH:  No cervical or supraclavicular LA.    BREASTS: No skin abnormalities, no erythema.  No discrete masses.  No nipple discharge, no axillary, supra- or infraclavicular LA.   CARDIOVASCULAR: Regular,S1, S2.  No S3 or S4.  No murmur/gallop/rub.  No peripheral edema.  RESPIRATORY: CTA bilaterally, no wheezes, rhonchi or rales.  GI: Bowel sounds wnl.  Soft, nontender, nondistended.  No masses or HSM.  No rebound or guarding.  : Vulva: normal, no lesions or discharge  Urethral meatus: normal size and location, no lesions or discharge  Urethra: no tenderness or masses  Bladder: no fullness or tenderness  Vagina: normal appearance, no abnormal discharge, no lesions.  No evidence of cystocele or rectocele.  Cervix: normal appearance, no lesions, no abnormal discharge, no cervical motion tenderness  Uterus: normal size and position, mobile, non-tender  Adnexa: no palpable masses bilaterally. No cervical motion  tenderness.  Pap smear obtained: yes  MSKEL: Grossly normal ROM.  No clubbing.  INTEGUMENTARY:  Warm, dry.  No rash noted on exposed skin.  NEUROLOGIC: Facies symmetric.  Grossly normal movement and tone.  No tremor.  PSYCHIATRIC: Affect normal.  Speech fluent.      PHQ Depression Screen  PHQ-9 SCORE 7/10/2018 10/26/2018 2/21/2019   PHQ-9 Total Score 14 20 12       Labs:  Results for orders placed or performed in visit on 02/21/19   T4, Free   Result Value Ref Range    T4 Free 0.66 0.60 - 1.60 ng/dL   T3, Total   Result Value Ref Range    Triiodothyronine (T3) 119 87 - 178 ng/dL   TSH   Result Value Ref Range    Thyrotropin 0.85 0.34 - 5.60 IU/mL   Wet Prep, Genital   Result Value Ref Range    Specimen Description Vagina     Wet Prep Clue cells seen (A)     Wet Prep No yeast seen     Wet Prep No Trichomonas seen        ASSESSMENT AND PLAN:      ICD-10-CM    1. Routine history and physical examination of adult Z00.00    2. Anxiety and depression F41.9 buPROPion (WELLBUTRIN XL) 300 MG 24 hr tablet    F32.9 busPIRone (BUSPAR) 5 MG tablet   3. Pap test, as part of routine gynecological examination Z01.419 HPV High Risk Types DNA Cervical     Pap Screen Thin Prep with HPV - recommended age 30 - 65 years (select HPV order below)   4. Screening for thyroid disorder Z13.29 TSH     T3, Total     T4, Free     T4, Free     T3, Total     TSH   5. Needs flu shot Z23 GH IMM-  HC FLU VAC PRESRV FREE QUAD SPLIT VIR 3+YRS IM   6. Vaginal discharge N89.8 Wet Prep, Genital         Completed a vaginal wet prep to rule out concerns with vaginal discharge.  Patient was positive for clue cells which indicates bacterial vaginosis.  Bacterial vaginosis - Given metronidazole for treatment.  Do NOT drink alcohol while taking the medication.  Return in 1-2 weeks with persistent symptoms after treatment as needed.     Anxiety and depression: Patient would prefer to continue on Wellbutrin  mg daily.  Refill medication.  Discussed anxiety  "medication options.  Patient was started on buspirone for anxiety.  Encourage recheck in 48 weeks for monitoring.  Patient can call in the next month if she would prefer to increase the dose of her buspirone.    Patient was given an influenza immunization.    Patient was screened for thyroid concerns.  Patient had an unremarkable TSH, T3 total, free T4.    Completed Pap and HPV for cervical cancer screening.  Results are pending.    Patient Instructions   Healthy Strategies  1. Eat at least 3 meals a day and never skip breakfast.  2. Eat more slowly.  3. Decrease portion size.  4. Provide structure by using meal replacement bars or shakes, and/or low calorie frozen meals.  5. For good nutrition incorporate fruit, vegetables, whole grains, lean protein, and low-fat dairy.  6. Remove trigger foods from yourenvironment to avoid impulse eating.  7. Increase physical activity: get a pedometer and aim for 10,000 steps a day or 30-35 minutes of activity 5 days per week.  8. Weigh yourself daily or at least weekly.  9. Keep a record of what you eat and your activity.  10. Establish a support system such as afriend, group or program.    11. Read Dax Mayers's \"Eat to Live\". Remember it is important to have a minimum of 1200 calories a day, okay to use olive oil, 40 grams of fiber daily. No more than two servings (the size of your palm) of red meat a week.     Please consider the following general health recommendations:    Schedule a mammogram annually starting at the age of 40 years old unless recommended earlier by your primary care provider. Come for a general exam once yearly.    Eat a quality diet (generally, low in simple sugars, starches, cholesterol and saturated fat.)    Please get 1500 mg of calcium in divided doses with 1500 units vitamin D in your diet daily. Take supplements as needed to obtain full recommended amounts.     Stay physically active. Regular walking or other exercise is one of the best ways to " minimize pain of arthritis; maintain independence and mobility; maintain bone strength; maintain conditioning of your heart. Find something you enjoy and a friend to do it with you.    Maintain ideal weight. Your Body mass index is Body mass index is 27.34 kg/m .. Generally a BMI of 20-25 is considered ideal. Overweight is defined as 25-30, obese is 30-35 and markedly obese is greater than 35.    Apply sun block (SPF 25 or greater) on exposed skin anytime you are out in the sun to prevent skin cancer.     Wear a seatbelt whenever you are in a car.    Consider a bone density study every 2-5 years to see if you are at increased risk for fracture. If you have osteoporosis, medicine to strengthen your bones can significantly reduce your risk of fracture, back pain, and loss of height.    Colonoscopy (an exam of the colon) is recommended every 10 years after the age of 50 to screen for colon cancer. (More often if you are at increased risk.)    Obtain a flu shot every fall.    You should have a tetanus booster at least once every 10 years.    Check blood sugar annually. Cholesterol annually unless you have had a normal level when last checked within 5 years.     I recommend that you have a general physical exam every year. You should have a pap test every 3 years between the ages of 21 and 30 and every 3-5 years between the ages of 30 and 65 depending on your test unless you have had previous abnormal pap smears, (in these cases the exams and PAP's should be done on a schedule as recommended by your primary care provider). If you have had hysterectomy in the past, your future Pap plan may be different.           Relevant cancer screening discussed.    Counseled on healthy diet, Calcium and vitamin D intake, and exercise.    Allison Linda PA-C..................2/21/2019 3:30 PM

## 2019-02-22 ASSESSMENT — ANXIETY QUESTIONNAIRES: GAD7 TOTAL SCORE: 14

## 2019-02-22 NOTE — TELEPHONE ENCOUNTER
Your thyroid is in the normal range.     Your vaginal wet prep showed that you have clue cells which indicates bacterial vaginosis. This is an overgrowth of bacteria in your vagina. This is not an STD.   Sent metronidazole to the pharmacy for treatment.  Do NOT drink alcohol while taking the medication.   Allison Linda PA-C..................2/21/2019 9:09 PM

## 2019-02-26 LAB
COPATH REPORT: NORMAL
PAP: NORMAL

## 2019-02-27 ENCOUNTER — MYC MEDICAL ADVICE (OUTPATIENT)
Dept: FAMILY MEDICINE | Facility: OTHER | Age: 34
End: 2019-02-27

## 2019-02-28 LAB
FINAL DIAGNOSIS: NORMAL
HPV HR 12 DNA CVX QL NAA+PROBE: NEGATIVE
HPV16 DNA SPEC QL NAA+PROBE: NEGATIVE
HPV18 DNA SPEC QL NAA+PROBE: NEGATIVE
SPECIMEN DESCRIPTION: NORMAL
SPECIMEN SOURCE CVX/VAG CYTO: NORMAL

## 2019-04-08 ENCOUNTER — OFFICE VISIT (OUTPATIENT)
Dept: FAMILY MEDICINE | Facility: OTHER | Age: 34
End: 2019-04-08
Attending: FAMILY MEDICINE
Payer: COMMERCIAL

## 2019-04-08 VITALS
DIASTOLIC BLOOD PRESSURE: 80 MMHG | HEART RATE: 72 BPM | TEMPERATURE: 98 F | BODY MASS INDEX: 27.52 KG/M2 | WEIGHT: 140.2 LBS | HEIGHT: 60 IN | RESPIRATION RATE: 14 BRPM | SYSTOLIC BLOOD PRESSURE: 116 MMHG

## 2019-04-08 DIAGNOSIS — R30.0 DYSURIA: Primary | ICD-10-CM

## 2019-04-08 PROCEDURE — 99213 OFFICE O/P EST LOW 20 MIN: CPT | Performed by: FAMILY MEDICINE

## 2019-04-08 RX ORDER — CIPROFLOXACIN 250 MG/1
250 TABLET, FILM COATED ORAL 2 TIMES DAILY
Qty: 14 TABLET | Refills: 0 | Status: SHIPPED | OUTPATIENT
Start: 2019-04-08 | End: 2019-05-06

## 2019-04-08 ASSESSMENT — MIFFLIN-ST. JEOR: SCORE: 1262.44

## 2019-04-08 ASSESSMENT — PAIN SCALES - GENERAL: PAINLEVEL: MODERATE PAIN (5)

## 2019-04-08 NOTE — PROGRESS NOTES
SUBJECTIVE:  Nato Bradley is a 34 year old female here for dysuria.  She has had 1 week of urinary frequency, fullness and abdominal discomfort.  Over the weekend she developed some left low back pain.  She has had urinary tract infections in the past and states that this feels very similar.  She had some chills last night.  No fevers.  No blood in her urine.  She has some loose stools.  No vaginal symptoms.    ROS:    As above otherwise ROS is unremarkable.    OBJECTIVE:  /80   Pulse 72   Temp 98  F (36.7  C)   Resp 14   Ht 1.524 m (5')   Wt 63.6 kg (140 lb 3.2 oz)   BMI 27.38 kg/m      EXAM:  General Appearance: Pleasant, alert, appropriate appearance for age. No acute distress  Abdomen: Suprapubic tenderness to palpation.  No rebound or guarding.  Positive bowel sounds.  Musculoskeletal: Left CVA tenderness is noted.    ASSESSEMENT AND PLAN:    1. Dysuria      Based on her symptoms would recommend empiric treatment with antibiotics.  Based on her last culture use Cipro twice daily for a week given her CVA tenderness.  If she is having no improvement in symptoms she will follow-up for reassessment.    Jairo Cao MD    This document was prepared using voice generated software.  While every attempt was made for accuracy, grammatical errors may exist.

## 2019-04-08 NOTE — NURSING NOTE
Patient presents today for urinary problem. Patient complains of painful urination, urgency and low back pain.  Medication Reconciliation Complete    Carmen Li LPN  4/8/2019 9:36 AM

## 2019-05-06 ENCOUNTER — OFFICE VISIT (OUTPATIENT)
Dept: FAMILY MEDICINE | Facility: OTHER | Age: 34
End: 2019-05-06
Attending: FAMILY MEDICINE
Payer: COMMERCIAL

## 2019-05-06 ENCOUNTER — MYC MEDICAL ADVICE (OUTPATIENT)
Dept: FAMILY MEDICINE | Facility: OTHER | Age: 34
End: 2019-05-06

## 2019-05-06 VITALS
BODY MASS INDEX: 26.42 KG/M2 | OXYGEN SATURATION: 100 % | WEIGHT: 134.6 LBS | DIASTOLIC BLOOD PRESSURE: 81 MMHG | HEART RATE: 64 BPM | RESPIRATION RATE: 16 BRPM | SYSTOLIC BLOOD PRESSURE: 124 MMHG | HEIGHT: 60 IN

## 2019-05-06 DIAGNOSIS — R39.89 URINARY PROBLEM: ICD-10-CM

## 2019-05-06 DIAGNOSIS — N30.00 ACUTE CYSTITIS WITHOUT HEMATURIA: Primary | ICD-10-CM

## 2019-05-06 LAB
ALBUMIN UR-MCNC: NEGATIVE MG/DL
APPEARANCE UR: ABNORMAL
BACTERIA #/AREA URNS HPF: ABNORMAL /HPF
BILIRUB UR QL STRIP: NEGATIVE
COLOR UR AUTO: ABNORMAL
GLUCOSE UR STRIP-MCNC: NEGATIVE MG/DL
HGB UR QL STRIP: NEGATIVE
KETONES UR STRIP-MCNC: 40 MG/DL
LEUKOCYTE ESTERASE UR QL STRIP: ABNORMAL
NITRATE UR QL: POSITIVE
NON-SQ EPI CELLS #/AREA URNS LPF: ABNORMAL /LPF
PH UR STRIP: 7.5 PH (ref 5–7)
RBC #/AREA URNS AUTO: ABNORMAL /HPF
SOURCE: ABNORMAL
SP GR UR STRIP: 1.01 (ref 1–1.03)
UROBILINOGEN UR STRIP-ACNC: 0.2 EU/DL (ref 0.2–1)
WBC #/AREA URNS AUTO: ABNORMAL /HPF

## 2019-05-06 PROCEDURE — 99213 OFFICE O/P EST LOW 20 MIN: CPT | Performed by: FAMILY MEDICINE

## 2019-05-06 PROCEDURE — 81001 URINALYSIS AUTO W/SCOPE: CPT | Mod: ZL | Performed by: FAMILY MEDICINE

## 2019-05-06 PROCEDURE — 87086 URINE CULTURE/COLONY COUNT: CPT | Mod: ZL | Performed by: FAMILY MEDICINE

## 2019-05-06 PROCEDURE — 87088 URINE BACTERIA CULTURE: CPT | Mod: ZL | Performed by: FAMILY MEDICINE

## 2019-05-06 RX ORDER — SULFAMETHOXAZOLE/TRIMETHOPRIM 800-160 MG
1 TABLET ORAL 2 TIMES DAILY
Qty: 10 TABLET | Refills: 0 | Status: SHIPPED | OUTPATIENT
Start: 2019-05-06 | End: 2019-07-24

## 2019-05-06 ASSESSMENT — PATIENT HEALTH QUESTIONNAIRE - PHQ9: SUM OF ALL RESPONSES TO PHQ QUESTIONS 1-9: 0

## 2019-05-06 ASSESSMENT — MIFFLIN-ST. JEOR: SCORE: 1237.04

## 2019-05-06 NOTE — NURSING NOTE
Patient presents to clinic with burning, pain, frequency with urination. She did take AZO early this morning due to the pain she was having.  Michelle Mcgee ....................  5/6/2019   3:42 PM

## 2019-05-06 NOTE — PROGRESS NOTES
Nursing Notes:   Michelle Kwon LPN  5/6/2019  3:44 PM  Sign at exiting of workspace  Patient presents to clinic with burning, pain, frequency with urination. She did take AZO early this morning due to the pain she was having.  Michelle Mcgee ....................  5/6/2019   3:42 PM        SUBJECTIVE: Nato Bradley is a 34 year old female who complains of urinary frequency and dysuria and awoke at about 4 am with symptoms so she took AZO due to the pain.   Had UTI in mid April and treated with Cipro with resolution and now symptoms again in last 24 hours. Last UTI before that in 12/2017.  and no STD concerns.     Social History     Social History Narrative    Moved here from the [facility].  Nato grew up in [place].  Tarun grew up in [place].       Tarun Mancilla, 2003 - son     Jim, 2011 - son    Jerome, 2014 - son    Clari,2016 - daughter    Homemaker.  Spouse works at [employer]       OBJECTIVE:   /81   Pulse 64   Resp 16   Ht 1.524 m (5')   Wt 61.1 kg (134 lb 9.6 oz)   LMP 04/08/2019   SpO2 100%   Breastfeeding? No   BMI 26.29 kg/m      Appears well, in no apparent distress.   Results for orders placed or performed in visit on 05/06/19   UA reflex to Microscopic and Culture   Result Value Ref Range    Color Urine Toma     Appearance Urine Slightly Cloudy     Glucose Urine Negative NEG^Negative mg/dL    Bilirubin Urine Negative NEG^Negative    Ketones Urine 40 (A) NEG^Negative mg/dL    Specific Gravity Urine 1.015 1.003 - 1.035    Blood Urine Negative NEG^Negative    pH Urine 7.5 (H) 5.0 - 7.0 pH    Protein Albumin Urine Negative NEG^Negative mg/dL    Urobilinogen Urine 0.2 0.2 - 1.0 EU/dL    Nitrite Urine Positive (A) NEG^Negative    Leukocyte Esterase Urine Trace (A) NEG^Negative    Source Midstream Urine      I have personally reviewed the labs listed above.  Urine culture pending.      ASSESSMENT:   1. Acute cystitis without hematuria    2.  Urinary problem          PLAN: Treatment per orders and due to use of AZO UA not helpful and urine culture pending - also push fluids, may use Pyridium OTC prn. Call or return to clinic prn if these symptoms worsen or fail to improve as anticipated.  Candy Smith MD  3:54 PM 5/6/2019

## 2019-05-06 NOTE — TELEPHONE ENCOUNTER
"Pt states she is having UTI symptoms once again    Pt last seen by PCP at [facility] 4/8/2019 and prescribed Cipro based on last UA.  Pt states UA was not done at the visit 4/8/2019 due to insurance and treated empirically based on symptoms.    \"Based on her symptoms would recommend empiric treatment with antibiotics.  Based on her last culture use Cipro twice daily for a week given her CVA tenderness.  If she is having no improvement in symptoms she will follow-up for reassessment.\"   Jairo Cao MD    Pt states symptoms had resolved with course of Cipro (finished 4/15/2019) but noticed she started to experience frequent urination about 1 week ago and now has pain upon urination that started 2 days ago.  Denies back pain at this time.  Pt states she has had a kidney infection in the past and is not currently experiencing symptoms indicative of kidney infection but would like to start treatment before it progresses to that.      Noted last UA 12/2017.  Advised pt to schedule appt for f/u and assessment. Pt in agreement with plan and requested [facility] appt.    Transferred to scheduling.  Noted appt today at [facility] at 3:45 PM    Treva James RN  ....................  5/6/2019   10:44 AM             "

## 2019-05-06 NOTE — PATIENT INSTRUCTIONS
Patient Education     Understanding Urinary Tract Infections (UTIs)  Most UTIs are caused by bacteria, although they may also be caused by viruses or fungi. Bacteria from the bowel are the most common source of infection. The infection may start because of any of the following:    Sexual activity. During sex, bacteria can travel from the penis, vagina, or rectum into the urethra.     Bacteria on the skin outside the rectum may travel into the urethra. This is more common in women since the rectum and urethra are closer to each other than in men. Wiping from front to back after using the toilet and keeping the area clean can help prevent germs from getting to the urethra.    Blockage of urine flow through the urinary tract. If urine sits too long, germs may start to grow out of control.      Parts of the urinary tract  The infection can occur in any part of the urinary tract.    The kidneys collect and store urine.    The ureters carry urine from the kidneys to the bladder.    The bladder holds urine until you are ready to let it out.    The urethra carries urine from the bladder out of the body. It is shorter in women, so bacteria can move through it more easily. The urethra is longer in men, so a UTI is less likely to reach the bladder or kidneys in men.  Date Last Reviewed: 1/1/2017 2000-2018 [employer]. [address] [place] [address], [place] [address]. All rights reserved. This information is not intended as a substitute for professional medical care. Always follow your healthcare professional's instructions.

## 2019-05-08 LAB
BACTERIA SPEC CULT: ABNORMAL
SPECIMEN SOURCE: ABNORMAL

## 2019-07-24 ENCOUNTER — OFFICE VISIT (OUTPATIENT)
Dept: FAMILY MEDICINE | Facility: OTHER | Age: 34
End: 2019-07-24
Attending: NURSE PRACTITIONER
Payer: COMMERCIAL

## 2019-07-24 VITALS
SYSTOLIC BLOOD PRESSURE: 120 MMHG | RESPIRATION RATE: 16 BRPM | BODY MASS INDEX: 24.7 KG/M2 | WEIGHT: 130.8 LBS | TEMPERATURE: 98.1 F | OXYGEN SATURATION: 99 % | HEART RATE: 72 BPM | DIASTOLIC BLOOD PRESSURE: 76 MMHG | HEIGHT: 61 IN

## 2019-07-24 DIAGNOSIS — N89.8 VAGINAL DISCHARGE: Primary | ICD-10-CM

## 2019-07-24 LAB
SPECIMEN SOURCE: NORMAL
WET PREP SPEC: NORMAL

## 2019-07-24 PROCEDURE — 99213 OFFICE O/P EST LOW 20 MIN: CPT | Performed by: NURSE PRACTITIONER

## 2019-07-24 PROCEDURE — 87210 SMEAR WET MOUNT SALINE/INK: CPT | Mod: ZL | Performed by: NURSE PRACTITIONER

## 2019-07-24 ASSESSMENT — PAIN SCALES - GENERAL: PAINLEVEL: NO PAIN (0)

## 2019-07-24 ASSESSMENT — ANXIETY QUESTIONNAIRES
IF YOU CHECKED OFF ANY PROBLEMS ON THIS QUESTIONNAIRE, HOW DIFFICULT HAVE THESE PROBLEMS MADE IT FOR YOU TO DO YOUR WORK, TAKE CARE OF THINGS AT HOME, OR GET ALONG WITH OTHER PEOPLE: SOMEWHAT DIFFICULT
2. NOT BEING ABLE TO STOP OR CONTROL WORRYING: NOT AT ALL
GAD7 TOTAL SCORE: 4
3. WORRYING TOO MUCH ABOUT DIFFERENT THINGS: SEVERAL DAYS
1. FEELING NERVOUS, ANXIOUS, OR ON EDGE: SEVERAL DAYS
7. FEELING AFRAID AS IF SOMETHING AWFUL MIGHT HAPPEN: NOT AT ALL
5. BEING SO RESTLESS THAT IT IS HARD TO SIT STILL: NOT AT ALL
6. BECOMING EASILY ANNOYED OR IRRITABLE: SEVERAL DAYS

## 2019-07-24 ASSESSMENT — MIFFLIN-ST. JEOR: SCORE: 1231.71

## 2019-07-24 ASSESSMENT — PATIENT HEALTH QUESTIONNAIRE - PHQ9
SUM OF ALL RESPONSES TO PHQ QUESTIONS 1-9: 2
5. POOR APPETITE OR OVEREATING: SEVERAL DAYS

## 2019-07-24 NOTE — PROGRESS NOTES
Subjective     Nato Bradley is a 34 year old female who presents to clinic today for the following health issues:    HPI   Vaginal Symptoms  Onset: 5 days    Description:  Vaginal Discharge: white   Itching (Pruritis): no   Burning sensation:  no   Odor: no     Accompanying Signs & Symptoms:  Pain with Urination: no   Abdominal Pain: no   Fever: no     History:   Sexually active: YES  New Partner: no   Possibility of Pregnancy:  No    Precipitating factors:   Recent Antibiotic Use: no     Alleviating factors:  nothing    Therapies Tried and outcome: nothing    Patient Active Problem List   Diagnosis     Depressive disorder     Anxiety and depression     Past Surgical History:   Procedure Laterality Date      SECTION      No Comments Provided     EXTRACTION(S) DENTAL      No Comments Provided       Social History     Tobacco Use     Smoking status: Never Smoker     Smokeless tobacco: Never Used   Substance Use Topics     Alcohol use: Yes     Alcohol/week: 0.0 oz     Comment: occasional     Family History   Problem Relation Age of Onset     Diabetes Mother         Diabetes     Hypertension Mother         Hypertension     Thyroid Disease Mother         Thyroid Disease     Thyroid Disease Sister         hashimoto's (dx 2018)     Heart Disease Maternal Grandmother         Heart Disease     Hypertension Maternal Grandmother         Hypertension     Lung Cancer Maternal Grandmother         ? cervical cancer in 20s.      Heart Disease Maternal Grandfather         Heart Disease     Prostate Cancer Maternal Grandfather         Cancer-prostate     Diabetes Maternal Grandfather         Diabetes     Hypertension Maternal Grandfather         Hypertension     Hypertension Maternal Aunt         Hypertension     Thyroid Disease Maternal Aunt         Thyroid Disease     Hypertension Paternal Aunt         Hypertension     Breast Cancer No family hx of         Cancer-breast     Colon Cancer No family hx of          "Cancer-colon     Ovarian Cancer No family hx of         Cancer-ovarian     Other - See Comments No family hx of         Stroke     Blood Disease No family hx of         Blood Disease         Current Outpatient Medications   Medication Sig Dispense Refill     buPROPion (WELLBUTRIN XL) 300 MG 24 hr tablet Take 1 tablet (300 mg) by mouth every morning 90 tablet 3     hydrOXYzine (ATARAX) 25 MG tablet Take 1-2 tablets (25-50 mg) by mouth every 6 hours as needed for anxiety 60 tablet 1     traZODone (DESYREL) 50 MG tablet Take 1 tablet (50 mg) by mouth nightly as needed for sleep 30 tablet 11     No Known Allergies    Reviewed and updated as needed this visit by Provider         Review of Systems   ROS COMP: As above      Objective    /76   Pulse 72   Temp 98.1  F (36.7  C) (Temporal)   Resp 16   Ht 1.543 m (5' 0.75\")   Wt 59.3 kg (130 lb 12.8 oz)   LMP 07/06/2019   SpO2 99%   BMI 24.92 kg/m    Body mass index is 24.92 kg/m .  Physical Exam   GENERAL: healthy, alert and no distress  RESP: lungs clear to auscultation - no rales, rhonchi or wheezes  CV: regular rate and rhythm, normal S1 S2, no S3 or S4, no murmur, click or rub, no peripheral edema and peripheral pulses strong  ABDOMEN: soft, nontender, no hepatosplenomegaly, no masses and bowel sounds normal   (female): normal female external genitalia, normal urethral meatus , vaginal mucosa pink, moist, well rugated, vaginal discharge - scant, white, thin and odorless and normal cervix, adnexae, and uterus without masses.  MS: no gross musculoskeletal defects noted, no edema  SKIN: no suspicious lesions or rashes  NEURO: Normal strength and tone, mentation intact and speech normal  PSYCH: mentation appears normal, affect normal/bright    Diagnostic Test Results:  Labs reviewed in Epic  Results for orders placed or performed in visit on 07/24/19   Wet Prep, Genital   Result Value Ref Range    Specimen Description Vagina     Wet Prep No clue cells seen     " Wet Prep No yeast seen     Wet Prep No Trichomonas seen            Assessment & Plan     1. Vaginal discharge  Wet prep negative as above, will continue to monitor symptoms, return to clinic if needed.   - Wet Prep, Genital       Tiffanie Allne NP  [facility] AND [facility] [place]

## 2019-07-24 NOTE — NURSING NOTE
"Chief Complaint   Patient presents with     Vaginal Discharge     x 5 days         Initial /76   Pulse 72   Temp 98.1  F (36.7  C) (Temporal)   Resp 16   Ht 1.543 m (5' 0.75\")   Wt 59.3 kg (130 lb 12.8 oz)   LMP 07/06/2019   SpO2 99%   BMI 24.92 kg/m   Estimated body mass index is 24.92 kg/m  as calculated from the following:    Height as of this encounter: 1.543 m (5' 0.75\").    Weight as of this encounter: 59.3 kg (130 lb 12.8 oz).    Medication Reconciliation: complete      Norma J. Gosselin, LPN  "

## 2019-07-25 ASSESSMENT — ANXIETY QUESTIONNAIRES: GAD7 TOTAL SCORE: 4

## 2019-08-01 ENCOUNTER — MYC MEDICAL ADVICE (OUTPATIENT)
Dept: FAMILY MEDICINE | Facility: OTHER | Age: 34
End: 2019-08-01

## 2019-08-06 ENCOUNTER — TRANSFERRED RECORDS (OUTPATIENT)
Dept: HEALTH INFORMATION MANAGEMENT | Facility: OTHER | Age: 34
End: 2019-08-06

## 2019-08-16 ENCOUNTER — OFFICE VISIT (OUTPATIENT)
Dept: FAMILY MEDICINE | Facility: OTHER | Age: 34
End: 2019-08-16
Attending: FAMILY MEDICINE
Payer: COMMERCIAL

## 2019-08-16 VITALS
HEIGHT: 60 IN | TEMPERATURE: 98.9 F | WEIGHT: 131.2 LBS | SYSTOLIC BLOOD PRESSURE: 112 MMHG | BODY MASS INDEX: 25.76 KG/M2 | RESPIRATION RATE: 16 BRPM | HEART RATE: 68 BPM | DIASTOLIC BLOOD PRESSURE: 76 MMHG

## 2019-08-16 DIAGNOSIS — Z00.00 HEALTH CARE MAINTENANCE: Primary | ICD-10-CM

## 2019-08-16 LAB
HBA1C MFR BLD: 5 % (ref 4–6)
TSH SERPL DL<=0.05 MIU/L-ACNC: 1.31 IU/ML (ref 0.34–5.6)

## 2019-08-16 PROCEDURE — 36415 COLL VENOUS BLD VENIPUNCTURE: CPT | Mod: ZL | Performed by: FAMILY MEDICINE

## 2019-08-16 PROCEDURE — 99212 OFFICE O/P EST SF 10 MIN: CPT | Performed by: FAMILY MEDICINE

## 2019-08-16 PROCEDURE — 83036 HEMOGLOBIN GLYCOSYLATED A1C: CPT | Mod: ZL | Performed by: FAMILY MEDICINE

## 2019-08-16 PROCEDURE — 84443 ASSAY THYROID STIM HORMONE: CPT | Mod: ZL | Performed by: FAMILY MEDICINE

## 2019-08-16 ASSESSMENT — MIFFLIN-ST. JEOR: SCORE: 1221.62

## 2019-08-16 ASSESSMENT — PATIENT HEALTH QUESTIONNAIRE - PHQ9: SUM OF ALL RESPONSES TO PHQ QUESTIONS 1-9: 0

## 2019-08-16 ASSESSMENT — PAIN SCALES - GENERAL: PAINLEVEL: NO PAIN (0)

## 2019-08-16 NOTE — NURSING NOTE
Patient here for paperwork to be filled out to become a surrogate.   Chief Complaint   Patient presents with     Forms     paperwork for surrogate.       Initial /76 (BP Location: Right arm, Patient Position: Sitting, Cuff Size: Adult Regular)   Pulse 68   Temp 98.9  F (37.2  C) (Tympanic)   Resp 16   Ht 1.524 m (5')   Wt 59.5 kg (131 lb 3.2 oz)   LMP 08/05/2019   BMI 25.62 kg/m   Estimated body mass index is 25.62 kg/m  as calculated from the following:    Height as of this encounter: 1.524 m (5').    Weight as of this encounter: 59.5 kg (131 lb 3.2 oz).  Medication Reconciliation: complete    TERE Desir LPN ..........8/16/2019 3:38 PM

## 2019-08-18 NOTE — PROGRESS NOTES
SUBJECTIVE:   Nato Bradley is a 34 year old female who presents to clinic today for the following health issues:    Is interested in becoming a surrogate for a couple wanting to have a child.  She is done some initial research into what is necessary.  She has a form that she needs filled out indicating her vitals.  She needs a fasting glucose done and a TSH.  No other questions today.      OB History    Para Term  AB Living   5 4 6 0 1 4   SAB TAB Ectopic Multiple Live Births   0 0 0 0 0      # Outcome Date GA Lbr Jovi/2nd Weight Sex Delivery Anes PTL Lv   5             4 Term            3 Term            2 Term            1 Term                  Review of Systems see HPI, review of systems is otherwise negative.    OBJECTIVE:     /76 (BP Location: Right arm, Patient Position: Sitting, Cuff Size: Adult Regular)   Pulse 68   Temp 98.9  F (37.2  C) (Tympanic)   Resp 16   Ht 1.524 m (5')   Wt 59.5 kg (131 lb 3.2 oz)   LMP 2019   BMI 25.62 kg/m    Body mass index is 25.62 kg/m .  Physical Exam   Constitutional: She appears well-developed and well-nourished.   HENT:   Right Ear: External ear normal.   Left Ear: External ear normal.   Eyes: Conjunctivae are normal. No scleral icterus.   Cardiovascular: Normal rate.   Pulmonary/Chest: Effort normal. No respiratory distress.   Musculoskeletal: She exhibits no edema.   Neurological: She is alert.   Skin: No rash noted.   Psychiatric: She has a normal mood and affect.       Diagnostic Test Results:  Results for orders placed or performed in visit on 19   Hemoglobin A1c   Result Value Ref Range    Hemoglobin A1C 5.0 4.0 - 6.0 %   TSH Reflex GH   Result Value Ref Range    TSH Reflex 1.31 0.34 - 5.60 IU/mL       ASSESSMENT/PLAN:         ICD-10-CM    1. Health care maintenance Z00.00 TSH Reflex GH     Hemoglobin A1c     Hemoglobin A1c     TSH Reflex GH     Form reviewed and signed.  Labs ordered as above.  Patient is not fasting,  opted for hemoglobin A1c to screen for diabetes.  If a fasting glucose is mandatory, patient can let us know and she can return for a lab draw.    Kristal Becker MD  [facility] AND [facility]

## 2019-12-06 ENCOUNTER — MYC MEDICAL ADVICE (OUTPATIENT)
Dept: FAMILY MEDICINE | Facility: OTHER | Age: 34
End: 2019-12-06

## 2020-01-10 DIAGNOSIS — Z33.3 SURROGATE PREGNANCY: Primary | ICD-10-CM

## 2020-02-04 ENCOUNTER — OFFICE VISIT (OUTPATIENT)
Dept: OBGYN | Facility: OTHER | Age: 35
End: 2020-02-04
Attending: OBSTETRICS & GYNECOLOGY
Payer: COMMERCIAL

## 2020-02-04 VITALS
SYSTOLIC BLOOD PRESSURE: 110 MMHG | BODY MASS INDEX: 27.15 KG/M2 | DIASTOLIC BLOOD PRESSURE: 70 MMHG | HEART RATE: 60 BPM | WEIGHT: 139 LBS

## 2020-02-04 DIAGNOSIS — Z31.69 ENCOUNTER FOR PRECONCEPTION CONSULTATION: Primary | ICD-10-CM

## 2020-02-04 PROCEDURE — 99203 OFFICE O/P NEW LOW 30 MIN: CPT | Performed by: OBSTETRICS & GYNECOLOGY

## 2020-02-04 RX ORDER — ESTRADIOL 2 MG/1
1 TABLET ORAL 2 TIMES DAILY
COMMUNITY
Start: 2020-01-13 | End: 2020-07-15

## 2020-02-04 ASSESSMENT — PAIN SCALES - GENERAL: PAINLEVEL: NO PAIN (0)

## 2020-02-04 NOTE — PROGRESS NOTES
Gynecology Visit    CC: establish care    HPI:    Nato Bradley is a 35 year old , here for establishing OB care. She is planning to be a gestational carrier. She is already matched with a couple in [place] and has her medical intake next week. She will need to have monitoring done here in [place] with records faxed to her fertility clinic. She plans to have her prenatal care at [facility] with delivery in [place], as they require a Level 3 nursery and she is considering     Contraception: vasectomy  OBHx:  x2, c/s x1 for breech followed by successful . No GDM, HTN or PPH in any of her pregnancies.      OBHx  OB History    Para Term  AB Living   5 4 4 0 1 4   SAB TAB Ectopic Multiple Live Births   0 1 0 0 4      # Outcome Date GA Lbr Jovi/2nd Weight Sex Delivery Anes PTL Lv   5 TAB            4 Term         MT   3 Term      -SEC   MT      Complications: Breech presentation   2 Term      Vag-Spont   MT   1 Term      Vag-Spont   MT       PMHx:   Past Medical History:   Diagnosis Date     Personal history of other mental and behavioral disorders     No Comments Provided      PSHx:   Past Surgical History:   Procedure Laterality Date      SECTION      No Comments Provided     EXTRACTION(S) DENTAL      No Comments Provided      Meds:   Current Outpatient Medications   Medication     estradiol (ESTRACE) 2 MG tablet     No current facility-administered medications for this visit.        Allergies:   No Known Allergies      SocHx:   Social History     Tobacco Use     Smoking status: Never Smoker     Smokeless tobacco: Never Used   Substance Use Topics     Alcohol use: Yes     Alcohol/week: 0.0 standard drinks     Comment: occasional     Drug use: No     Lives with her  and 4 kids. Is a stay at home mom.     FamHx:   Family History   Problem Relation Age of Onset     Diabetes Mother         Diabetes     Hypertension Mother         Hypertension     Thyroid Disease  Mother         Thyroid Disease     Thyroid Disease Sister         hashimoto's (dx 2018)     Heart Disease Maternal Grandmother         Heart Disease     Hypertension Maternal Grandmother         Hypertension     Lung Cancer Maternal Grandmother         ? cervical cancer in 20s.      Heart Disease Maternal Grandfather         Heart Disease     Prostate Cancer Maternal Grandfather         Cancer-prostate     Diabetes Maternal Grandfather         Diabetes     Hypertension Maternal Grandfather         Hypertension     Hypertension Maternal Aunt         Hypertension     Thyroid Disease Maternal Aunt         Thyroid Disease     Hypertension Paternal Aunt         Hypertension     Breast Cancer No family hx of         Cancer-breast     Colon Cancer No family hx of         Cancer-colon     Ovarian Cancer No family hx of         Cancer-ovarian     Other - See Comments No family hx of         Stroke     Blood Disease No family hx of         Blood Disease        Physical Exam  /70 (BP Location: Right arm, Patient Position: Sitting, Cuff Size: Adult Regular)   Pulse 60   Wt 63 kg (139 lb)   LMP 2020 (Exact Date)   Breastfeeding No   BMI 27.15 kg/m    Gen: Well-appearing, NAD  Resp: nonlabored  Psych: appropriate mood and affect      Assessment/Plan  Nato Bradley is a 35 year old  female here for preconception counseling in preparation to be a gestational carrier. Discussed what services are available for monitoring at [facility]- same day lab results, ultrasound monitoring, prenatal care, level 2 US with M consultation. Would plan for a consult at 32 weeks in [place]. She was provided the clinic contact information.     30 minutes were spent with the patient with >50% spent counseling on preconception counseling.      RTC after pregnancy achieved or sooner prn- may want injection teaching by ALFRED Henry MD  OB/GYN  2020 9:08 AM

## 2020-02-04 NOTE — NURSING NOTE
Chief Complaint   Patient presents with     Consult     Planning Surrogacy pregnancy       Medication Reconciliation: completed   Belkis Manzo LPN  2/4/2020 8:24 AM

## 2020-02-10 ENCOUNTER — TRANSFERRED RECORDS (OUTPATIENT)
Dept: HEALTH INFORMATION MANAGEMENT | Facility: OTHER | Age: 35
End: 2020-02-10

## 2020-03-10 ENCOUNTER — HOSPITAL ENCOUNTER (OUTPATIENT)
Dept: ULTRASOUND IMAGING | Facility: OTHER | Age: 35
End: 2020-03-10
Attending: OBSTETRICS & GYNECOLOGY

## 2020-03-10 DIAGNOSIS — Z31.83 IN VITRO FERTILIZATION: Primary | ICD-10-CM

## 2020-03-10 DIAGNOSIS — Z31.83 IN VITRO FERTILIZATION: ICD-10-CM

## 2020-03-10 LAB
B-HCG SERPL-ACNC: <1 IU/L
ESTRADIOL SERPL-MCNC: 34 PG/ML
LH SERPL-ACNC: 2.1 IU/L
PROGEST SERPL-MCNC: 2.1 NG/ML

## 2020-03-10 PROCEDURE — 36415 COLL VENOUS BLD VENIPUNCTURE: CPT | Mod: ZL

## 2020-03-10 PROCEDURE — 83002 ASSAY OF GONADOTROPIN (LH): CPT | Mod: ZL

## 2020-03-10 PROCEDURE — 82670 ASSAY OF TOTAL ESTRADIOL: CPT | Mod: ZL

## 2020-03-10 PROCEDURE — 84144 ASSAY OF PROGESTERONE: CPT | Mod: ZL

## 2020-03-10 PROCEDURE — 76857 US EXAM PELVIC LIMITED: CPT

## 2020-03-10 PROCEDURE — 84702 CHORIONIC GONADOTROPIN TEST: CPT | Mod: ZL

## 2020-03-11 ENCOUNTER — MEDICAL CORRESPONDENCE (OUTPATIENT)
Dept: HEALTH INFORMATION MANAGEMENT | Facility: OTHER | Age: 35
End: 2020-03-11

## 2020-03-11 ENCOUNTER — HEALTH MAINTENANCE LETTER (OUTPATIENT)
Age: 35
End: 2020-03-11

## 2020-03-11 DIAGNOSIS — Z31.49 OTHER INVESTIGATION AND TESTING FOR PROCREATIVE MANAGEMENT: Primary | ICD-10-CM

## 2020-03-11 LAB — PROGEST SERPL-MCNC: 1.4 NG/ML

## 2020-03-11 PROCEDURE — 84144 ASSAY OF PROGESTERONE: CPT | Mod: ZL

## 2020-03-11 PROCEDURE — 36415 COLL VENOUS BLD VENIPUNCTURE: CPT | Mod: ZL

## 2020-03-17 ENCOUNTER — HOSPITAL ENCOUNTER (OUTPATIENT)
Dept: ULTRASOUND IMAGING | Facility: OTHER | Age: 35
End: 2020-03-17
Attending: OBSTETRICS & GYNECOLOGY

## 2020-03-17 DIAGNOSIS — Z31.83 IN VITRO FERTILIZATION: ICD-10-CM

## 2020-03-17 DIAGNOSIS — Z31.83 IN VITRO FERTILIZATION: Primary | ICD-10-CM

## 2020-03-17 LAB
ESTRADIOL SERPL-MCNC: 180 PG/ML
PROGEST SERPL-MCNC: 1.5 NG/ML

## 2020-03-17 PROCEDURE — 82670 ASSAY OF TOTAL ESTRADIOL: CPT | Mod: ZL

## 2020-03-17 PROCEDURE — 36415 COLL VENOUS BLD VENIPUNCTURE: CPT | Mod: ZL

## 2020-03-17 PROCEDURE — 84144 ASSAY OF PROGESTERONE: CPT | Mod: ZL

## 2020-03-17 PROCEDURE — 76857 US EXAM PELVIC LIMITED: CPT

## 2020-03-24 ENCOUNTER — ALLIED HEALTH/NURSE VISIT (OUTPATIENT)
Dept: OBGYN | Facility: OTHER | Age: 35
End: 2020-03-24
Attending: OBSTETRICS & GYNECOLOGY

## 2020-03-24 ENCOUNTER — HOSPITAL ENCOUNTER (OUTPATIENT)
Dept: ULTRASOUND IMAGING | Facility: OTHER | Age: 35
End: 2020-03-24
Attending: OBSTETRICS & GYNECOLOGY

## 2020-03-24 DIAGNOSIS — Z31.83 IN VITRO FERTILIZATION: Primary | ICD-10-CM

## 2020-03-24 DIAGNOSIS — Z31.83 IN VITRO FERTILIZATION: ICD-10-CM

## 2020-03-24 DIAGNOSIS — Z31.83 ENCOUNTER FOR ASSISTED REPRODUCTIVE FERTILITY CYCLE: Primary | ICD-10-CM

## 2020-03-24 LAB
ESTRADIOL SERPL-MCNC: 200 PG/ML
PROGEST SERPL-MCNC: 1.9 NG/ML

## 2020-03-24 PROCEDURE — 99211 OFF/OP EST MAY X REQ PHY/QHP: CPT

## 2020-03-24 PROCEDURE — 82670 ASSAY OF TOTAL ESTRADIOL: CPT | Mod: ZL | Performed by: OBSTETRICS & GYNECOLOGY

## 2020-03-24 PROCEDURE — 84144 ASSAY OF PROGESTERONE: CPT | Mod: ZL | Performed by: OBSTETRICS & GYNECOLOGY

## 2020-03-24 PROCEDURE — 36415 COLL VENOUS BLD VENIPUNCTURE: CPT | Mod: ZL | Performed by: OBSTETRICS & GYNECOLOGY

## 2020-03-24 PROCEDURE — 76857 US EXAM PELVIC LIMITED: CPT

## 2020-03-24 NOTE — NURSING NOTE
Patient presented to [facility] requesting education on progesterone injections. Her fertility clinic in [place] is managing her medications and instructed her to give injections IM in the upper outer quadrant dorsal gluteal location. Area on both left and right side were outlined for patient. All immediate questions were answered and patient was instructed to call or return to clinic with any further questions.    Tigist Frost RN...................3/24/2020 8:10 AM

## 2020-03-30 ENCOUNTER — TRANSFERRED RECORDS (OUTPATIENT)
Dept: HEALTH INFORMATION MANAGEMENT | Facility: OTHER | Age: 35
End: 2020-03-30

## 2020-04-08 ENCOUNTER — MEDICAL CORRESPONDENCE (OUTPATIENT)
Dept: HEALTH INFORMATION MANAGEMENT | Facility: OTHER | Age: 35
End: 2020-04-08

## 2020-04-08 DIAGNOSIS — Z32.00 PREGNANCY EXAMINATION OR TEST, PREGNANCY UNCONFIRMED: Primary | ICD-10-CM

## 2020-04-08 LAB
B-HCG SERPL-ACNC: 174 IU/L
ESTRADIOL SERPL-MCNC: 123 PG/ML
PROGEST SERPL-MCNC: 36.9 NG/ML

## 2020-04-08 PROCEDURE — 84702 CHORIONIC GONADOTROPIN TEST: CPT | Mod: ZL

## 2020-04-08 PROCEDURE — 82670 ASSAY OF TOTAL ESTRADIOL: CPT | Mod: ZL

## 2020-04-08 PROCEDURE — 36415 COLL VENOUS BLD VENIPUNCTURE: CPT | Mod: ZL

## 2020-04-08 PROCEDURE — 84144 ASSAY OF PROGESTERONE: CPT | Mod: ZL

## 2020-04-13 ENCOUNTER — OFFICE VISIT (OUTPATIENT)
Dept: FAMILY MEDICINE | Facility: OTHER | Age: 35
End: 2020-04-13
Attending: FAMILY MEDICINE
Payer: COMMERCIAL

## 2020-04-13 ENCOUNTER — NURSE TRIAGE (OUTPATIENT)
Dept: FAMILY MEDICINE | Facility: OTHER | Age: 35
End: 2020-04-13

## 2020-04-13 ENCOUNTER — MEDICAL CORRESPONDENCE (OUTPATIENT)
Dept: HEALTH INFORMATION MANAGEMENT | Facility: OTHER | Age: 35
End: 2020-04-13

## 2020-04-13 ENCOUNTER — TELEPHONE (OUTPATIENT)
Dept: OBGYN | Facility: OTHER | Age: 35
End: 2020-04-13

## 2020-04-13 VITALS
TEMPERATURE: 99.5 F | OXYGEN SATURATION: 99 % | SYSTOLIC BLOOD PRESSURE: 140 MMHG | DIASTOLIC BLOOD PRESSURE: 100 MMHG | BODY MASS INDEX: 27.34 KG/M2 | HEART RATE: 68 BPM | WEIGHT: 140 LBS

## 2020-04-13 DIAGNOSIS — R07.89 CHEST TIGHTNESS: Primary | ICD-10-CM

## 2020-04-13 DIAGNOSIS — Z33.3 SURROGATE PREGNANCY: ICD-10-CM

## 2020-04-13 DIAGNOSIS — O09.811 SUPERVISION OF PREGNANCY RESULTING FROM ASSISTED REPRODUCTIVE TECHNOLOGY IN FIRST TRIMESTER: Primary | ICD-10-CM

## 2020-04-13 DIAGNOSIS — R07.89 CHEST TIGHTNESS: ICD-10-CM

## 2020-04-13 LAB
ALBUMIN SERPL-MCNC: 4.1 G/DL (ref 3.5–5.7)
ALP SERPL-CCNC: 71 U/L (ref 34–104)
ALT SERPL W P-5'-P-CCNC: 8 U/L (ref 7–52)
ANION GAP SERPL CALCULATED.3IONS-SCNC: 8 MMOL/L (ref 3–14)
AST SERPL W P-5'-P-CCNC: 14 U/L (ref 13–39)
B-HCG SERPL-ACNC: 2767 IU/L
BASOPHILS # BLD AUTO: 0 10E9/L (ref 0–0.2)
BASOPHILS NFR BLD AUTO: 0.4 %
BILIRUB SERPL-MCNC: 0.3 MG/DL (ref 0.3–1)
BUN SERPL-MCNC: 7 MG/DL (ref 7–25)
CALCIUM SERPL-MCNC: 9.2 MG/DL (ref 8.6–10.3)
CHLORIDE SERPL-SCNC: 105 MMOL/L (ref 98–107)
CO2 SERPL-SCNC: 23 MMOL/L (ref 21–31)
CREAT SERPL-MCNC: 0.79 MG/DL (ref 0.6–1.2)
D DIMER PPP DDU-MCNC: 421 NG/ML D-DU (ref 0–230)
DIFFERENTIAL METHOD BLD: NORMAL
EOSINOPHIL # BLD AUTO: 0.2 10E9/L (ref 0–0.7)
EOSINOPHIL NFR BLD AUTO: 2.1 %
ERYTHROCYTE [DISTWIDTH] IN BLOOD BY AUTOMATED COUNT: 12 % (ref 10–15)
ESTRADIOL SERPL-MCNC: 208 PG/ML
FERRITIN SERPL-MCNC: 27 NG/ML (ref 23.9–336.2)
GFR SERPL CREATININE-BSD FRML MDRD: 83 ML/MIN/{1.73_M2}
GLUCOSE SERPL-MCNC: 106 MG/DL (ref 70–105)
HCT VFR BLD AUTO: 41.1 % (ref 35–47)
HGB BLD-MCNC: 13.9 G/DL (ref 11.7–15.7)
IMM GRANULOCYTES # BLD: 0 10E9/L (ref 0–0.4)
IMM GRANULOCYTES NFR BLD: 0.3 %
LYMPHOCYTES # BLD AUTO: 1.8 10E9/L (ref 0.8–5.3)
LYMPHOCYTES NFR BLD AUTO: 22.9 %
MCH RBC QN AUTO: 30.2 PG (ref 26.5–33)
MCHC RBC AUTO-ENTMCNC: 33.8 G/DL (ref 31.5–36.5)
MCV RBC AUTO: 89 FL (ref 78–100)
MONOCYTES # BLD AUTO: 0.5 10E9/L (ref 0–1.3)
MONOCYTES NFR BLD AUTO: 6 %
NEUTROPHILS # BLD AUTO: 5.4 10E9/L (ref 1.6–8.3)
NEUTROPHILS NFR BLD AUTO: 68.3 %
PLATELET # BLD AUTO: 311 10E9/L (ref 150–450)
POTASSIUM SERPL-SCNC: 3.8 MMOL/L (ref 3.5–5.1)
PROGEST SERPL-MCNC: 52.8 NG/ML
PROT SERPL-MCNC: 7.4 G/DL (ref 6.4–8.9)
RBC # BLD AUTO: 4.61 10E12/L (ref 3.8–5.2)
SODIUM SERPL-SCNC: 136 MMOL/L (ref 134–144)
WBC # BLD AUTO: 8 10E9/L (ref 4–11)

## 2020-04-13 PROCEDURE — 85379 FIBRIN DEGRADATION QUANT: CPT | Mod: ZL | Performed by: FAMILY MEDICINE

## 2020-04-13 PROCEDURE — 80053 COMPREHEN METABOLIC PANEL: CPT | Mod: ZL | Performed by: FAMILY MEDICINE

## 2020-04-13 PROCEDURE — 36415 COLL VENOUS BLD VENIPUNCTURE: CPT | Mod: ZL | Performed by: FAMILY MEDICINE

## 2020-04-13 PROCEDURE — 84144 ASSAY OF PROGESTERONE: CPT | Mod: ZL

## 2020-04-13 PROCEDURE — 36415 COLL VENOUS BLD VENIPUNCTURE: CPT | Mod: ZL

## 2020-04-13 PROCEDURE — 99214 OFFICE O/P EST MOD 30 MIN: CPT | Performed by: FAMILY MEDICINE

## 2020-04-13 PROCEDURE — 82670 ASSAY OF TOTAL ESTRADIOL: CPT | Mod: ZL

## 2020-04-13 PROCEDURE — 85025 COMPLETE CBC W/AUTO DIFF WBC: CPT | Mod: ZL | Performed by: FAMILY MEDICINE

## 2020-04-13 PROCEDURE — 82728 ASSAY OF FERRITIN: CPT | Mod: ZL | Performed by: FAMILY MEDICINE

## 2020-04-13 PROCEDURE — 84702 CHORIONIC GONADOTROPIN TEST: CPT | Mod: ZL

## 2020-04-13 RX ORDER — SYRINGE-NEEDLE,INSULIN,0.5 ML 31 GX5/16"
SYRINGE, EMPTY DISPOSABLE MISCELLANEOUS SEE ADMIN INSTRUCTIONS
COMMUNITY
Start: 2020-02-11 | End: 2020-06-09

## 2020-04-13 RX ORDER — NEEDLES, DISPOSABLE 25GX5/8"
NEEDLE, DISPOSABLE MISCELLANEOUS
COMMUNITY
Start: 2020-02-11 | End: 2020-06-09

## 2020-04-13 RX ORDER — DEXAMETHASONE SODIUM PHOSPHATE 4 MG/ML
INJECTION, SOLUTION INTRAMUSCULAR; INTRAVENOUS
COMMUNITY
Start: 2020-02-11 | End: 2020-07-15

## 2020-04-13 ASSESSMENT — ENCOUNTER SYMPTOMS
SHORTNESS OF BREATH: 0
NAUSEA: 0
FEVER: 0
NERVOUS/ANXIOUS: 1
WHEEZING: 0
HEARTBURN: 0
COUGH: 0
CHEST TIGHTNESS: 1
CHILLS: 0

## 2020-04-13 ASSESSMENT — PAIN SCALES - GENERAL: PAINLEVEL: SEVERE PAIN (6)

## 2020-04-13 NOTE — NURSING NOTE
"Patient here for complaints of chest tightness. States it feels like there is \"a rock' sitting on her chest. Says this has been going on for four days.   Medication Reconciliation: uriel Al LPN  4/13/2020 11:29 AM  "

## 2020-04-13 NOTE — TELEPHONE ENCOUNTER
Called patient.  Her ferritin, Comprehensive Metabolic Profile and Complete Blood Count were normal.  Her D-dimer was a little elevated, but it is in the normal range for pregnancy.  Her level was 421.  For a non-pregnant individual, normal range is 0-230.  For a pregnant woman, the normal range is between 220-740.  Because her d-dimer is normal, I don't feel that she has a PE causing her chest tightness.  Did recommend that she monitor symptoms and follow up if there is any worsening.  Mariam De Los Santos MD

## 2020-04-13 NOTE — TELEPHONE ENCOUNTER
[facility]-patient requested to speak to a [facility] nurse regarding symptoms. Please call patient to advise.  Toñito Li on 4/13/2020 at 12:51 PM

## 2020-04-13 NOTE — PROGRESS NOTES
"  SUBJECTIVE:   Nursing Notes:   Uvaldo Al LPN  2020 11:31 AM  Sign at exiting of workspace  Patient here for complaints of chest tightness. States it feels like there is \"a rock' sitting on her chest. Says this has been going on for four days.   Medication Reconciliation: uriel Al LPN  2020 11:29 AM    Nato Bradley is a 35 year old female who presents to clinic today for a complaint of a feeling of tightness in her chest since 2020.  No shortness of breath.  Thinks it is from the medications she is on.  Just had her progesterone oil and estracd increased to 2 mg three times a day.  However, she had talked to the doctors she is seeing for her surrogate pregnancy and they have decreased her estrace now to twice daily.  She is a surrogate and is 4 1/2 weeks pregnant right now.  No swelling in her legs.  Has a couple of bruises from injections, but no other bleeding.  No known fever.  No cough or wheezing.  No swelling in her legs.  No changes in her sense of taste or smell.  No achiness of her legs.   just broke his leg last week and needed surgery the day her symptoms started.  She has a history of anxiety and has felt stressed more in the past few days due to her 's injury.      HPI    I personally reviewed medications/allergies/history listed below:    Patient Active Problem List    Diagnosis Date Noted     Anxiety and depression 2019     Priority: Medium     Depressive disorder 2006     Priority: Medium     Past Medical History:   Diagnosis Date     Personal history of other mental and behavioral disorders     No Comments Provided      Past Surgical History:   Procedure Laterality Date      SECTION      No Comments Provided     EXTRACTION(S) DENTAL      No Comments Provided     Family History   Problem Relation Age of Onset     Diabetes Mother         Diabetes     Hypertension Mother         Hypertension     Thyroid Disease " "Mother         Thyroid Disease     Thyroid Disease Sister         hashimoto's (dx 2018)     Heart Disease Maternal Grandmother         Heart Disease     Hypertension Maternal Grandmother         Hypertension     Lung Cancer Maternal Grandmother         ? cervical cancer in 20s.      Heart Disease Maternal Grandfather         Heart Disease     Prostate Cancer Maternal Grandfather         Cancer-prostate     Diabetes Maternal Grandfather         Diabetes     Hypertension Maternal Grandfather         Hypertension     Hypertension Maternal Aunt         Hypertension     Thyroid Disease Maternal Aunt         Thyroid Disease     Hypertension Paternal Aunt         Hypertension     Breast Cancer No family hx of         Cancer-breast     Colon Cancer No family hx of         Cancer-colon     Ovarian Cancer No family hx of         Cancer-ovarian     Other - See Comments No family hx of         Stroke     Blood Disease No family hx of         Blood Disease     Social History     Tobacco Use     Smoking status: Never Smoker     Smokeless tobacco: Never Used   Substance Use Topics     Alcohol use: Not Currently     Alcohol/week: 0.0 standard drinks     Comment: occasional     Social History     Social History Narrative    Moved here from the [facility].  Nato grew up in [place].  Tarun grew up in [place].       Tarun Mancilla, 2003 - son     Jim, 2011 - son    Jerome, 2014 - son    Clari,2016 - daughter    Homemaker.  Spouse works at [employer]     Current Outpatient Medications   Medication Sig Dispense Refill     B-D HYPODERMIC NEEDLE 22G X 1-1/2\" MISC Use to inject Progesterone in Oil.       BD INSULIN SYRINGE ULTRAFINE 31G X 5/16\" 0.5 ML miscellaneous See Admin Instructions       BD [employer] Home MISC Shar[employer] Container.       estradiol (ESTRACE) 2 MG tablet Take 1 tablet by mouth 3 times daily       Needle, Disp, (HYPODERMIC NEEDLE) 27G X 1/2\" MISC needle only for injection       " progesterone, in olive oil, 100 MG/ML CMPD injection Inject 100 mg into the muscle daily       No Known Allergies    Review of Systems   Constitutional: Negative for chills and fever.   Respiratory: Positive for chest tightness. Negative for cough, shortness of breath and wheezing.    Cardiovascular: Negative for chest pain and peripheral edema.   Gastrointestinal: Negative for heartburn and nausea.   Psychiatric/Behavioral: The patient is nervous/anxious.         OBJECTIVE:     BP (!) 140/100 (BP Location: Right arm, Patient Position: Sitting, Cuff Size: Adult Regular)   Pulse 68   Temp 99.5  F (37.5  C)   Wt 63.5 kg (140 lb)   LMP 03/05/2020 (Exact Date)   SpO2 99%   Breastfeeding No   BMI 27.34 kg/m    Body mass index is 27.34 kg/m .  Physical Exam  Constitutional:       General: She is not in acute distress.     Appearance: Normal appearance. She is normal weight. She is not ill-appearing, toxic-appearing or diaphoretic.   HENT:      Head: Normocephalic.      Right Ear: Tympanic membrane normal.      Left Ear: Tympanic membrane normal.      Nose: Nose normal.      Mouth/Throat:      Mouth: Mucous membranes are moist.   Eyes:      Extraocular Movements: Extraocular movements intact.      Pupils: Pupils are equal, round, and reactive to light.   Neck:      Musculoskeletal: Normal range of motion and neck supple.   Cardiovascular:      Rate and Rhythm: Normal rate and regular rhythm.      Pulses: Normal pulses.      Heart sounds: No murmur.   Pulmonary:      Effort: Pulmonary effort is normal. No respiratory distress.      Breath sounds: Normal breath sounds. No stridor. No wheezing, rhonchi or rales.   Musculoskeletal:         General: No swelling, tenderness, deformity or signs of injury.      Right lower leg: No edema.      Left lower leg: No edema.   Lymphadenopathy:      Cervical: No cervical adenopathy.   Neurological:      General: No focal deficit present.      Mental Status: She is alert.    Psychiatric:      Comments: anxious           PHQ-9 SCORE 5/6/2019 7/24/2019 8/16/2019   PHQ-9 Total Score 0 2 0       PHQ-2 Score:     PHQ-2 ( 1999 [employer]) 4/13/2020 2/4/2020   Q1: Little interest or pleasure in doing things 0 0   Q2: Feeling down, depressed or hopeless 0 0   PHQ-2 Score 0 0       OMAR-7 SCORE 7/10/2018 2/21/2019 7/24/2019   Total Score 16 14 4           I personally reviewed results withpatient as listed below:   Diagnostic Test Results:  Results for orders placed or performed in visit on 04/13/20 (from the past 24 hour(s))   Estradiol   Result Value Ref Range    Estradiol 208 pg/mL   Progesterone   Result Value Ref Range    Progesterone 52.8 ng/mL   HCG Quantitative Pregnancy, Blood (AQG559)   Result Value Ref Range    HCG Quantitative Serum 2,767 IU/L   Comprehensive Metabolic Panel   Result Value Ref Range    Sodium 136 134 - 144 mmol/L    Potassium 3.8 3.5 - 5.1 mmol/L    Chloride 105 98 - 107 mmol/L    Carbon Dioxide 23 21 - 31 mmol/L    Anion Gap 8 3 - 14 mmol/L    Glucose 106 (H) 70 - 105 mg/dL    Urea Nitrogen 7 7 - 25 mg/dL    Creatinine 0.79 0.60 - 1.20 mg/dL    GFR Estimate 83 >60 mL/min/[1.73_m2]    GFR Estimate If Black >90 >60 mL/min/[1.73_m2]    Calcium 9.2 8.6 - 10.3 mg/dL    Bilirubin Total 0.3 0.3 - 1.0 mg/dL    Albumin 4.1 3.5 - 5.7 g/dL    Protein Total 7.4 6.4 - 8.9 g/dL    Alkaline Phosphatase 71 34 - 104 U/L    ALT 8 7 - 52 U/L    AST 14 13 - 39 U/L   Ferritin   Result Value Ref Range    Ferritin 27 23.9 - 336.2 ng/mL   CBC and Differential   Result Value Ref Range    WBC 8.0 4.0 - 11.0 10e9/L    RBC Count 4.61 3.8 - 5.2 10e12/L    Hemoglobin 13.9 11.7 - 15.7 g/dL    Hematocrit 41.1 35.0 - 47.0 %    MCV 89 78 - 100 fl    MCH 30.2 26.5 - 33.0 pg    MCHC 33.8 31.5 - 36.5 g/dL    RDW 12.0 10.0 - 15.0 %    Platelet Count 311 150 - 450 10e9/L    Diff Method Automated Method     % Neutrophils 68.3 %    % Lymphocytes 22.9 %    % Monocytes 6.0 %    % Eosinophils 2.1 %    %  Basophils 0.4 %    % Immature Granulocytes 0.3 %    Absolute Neutrophil 5.4 1.6 - 8.3 10e9/L    Absolute Lymphocytes 1.8 0.8 - 5.3 10e9/L    Absolute Monocytes 0.5 0.0 - 1.3 10e9/L    Absolute Eosinophils 0.2 0.0 - 0.7 10e9/L    Absolute Basophils 0.0 0.0 - 0.2 10e9/L    Abs Immature Granulocytes 0.0 0 - 0.4 10e9/L   D-Dimer GH   Result Value Ref Range    D-Dimer ng/mL 421 (H) 0 - 230 ng/ml D-DU       ASSESSMENT/PLAN:       ICD-10-CM    1. Chest tightness  R07.89 PULSE OXIMETRY, SINGLE DETERMINATION     D-Dimer GH     CBC and Differential     Ferritin     Comprehensive Metabolic Panel   2. Surrogate pregnancy  Z33.3        1.  Her labs were completed as noted above.  According to Up to Date, the normal range for a D-dimer in pregnancy is between 220-740.  Taking pregnancy into consideration, I feel that this D-dimer would be normal for her.  Discussed wanting to avoid radiation exposure during pregnancy if possible, which she agrees to.  She is well appearing, sats are normal as well.  Anxiety/stress could be contributing.  With her low-grade fever, discussed that she also could potentially have a virus that could be causing symptoms.  Discussed symptoms to watch for as reasons to follow up.  2.  See above.    Mariam De Los Santos MD  [facility]    Portions of this dictation were created using the Dragon Nuance voice recognition system. Proofreading was completed but there may be errors in text.

## 2020-04-13 NOTE — TELEPHONE ENCOUNTER
Patient called stating she was seen in clinic today for chest tightness and she is currently a surrogate who is 4.5 weeks pregnant. She is concerned about a potential blood clot and mentioned this to Mariam De Los Santos MD. She is waiting to hear about her D-Dimer results and wants to know if she needs to come back for a chest x-ray or not. Informed her that I would forward this message to Mariam De Los Santos MD to determine the next steps in her care. It appears that patients D-Dimer results are elevated. Antony Saldana RN, BSN  ....................  4/13/2020   1:30 PM

## 2020-04-13 NOTE — TELEPHONE ENCOUNTER
Pt transferred from scheduling with Chest tightness/pressure.      Pt is currently pregnant as a surrogate through IVF.  She is 4.5 weeks.  She is currently taking 2 mg of estradiol TID along with progesterone injections. This is her 5th pregnancy.  Pt had contacted fertility clinic in [place] and they advised her to call local triage line to rule out PE.     Pt states last Wed (4/8/2020) her progesterone and estradiol were increased.  Thursday she noted the chest pressure.  Describes as a restriction/tightness center of chest (non radiating).  She states that it is not indigestion/heartburn/reflux. Constant annoying 5-6/10.  Also c/o intermittent dizziness but nothing since Friday (4/10/2020).  Pt denies SOB, weakness, numbness, headache (had one previously), hx of blood clots, coughing up blood, changes in bowel/bladder habits, URI symptoms.  States she is more aware of the pressure when she sits but feels this may be as she is thinking about it more.     Advised Pt to present to the clinic in the next hour or to ED if symptoms worsen in any way.    The patient indicates understanding of these issues and agrees with the plan.    Treva James RN  ....................  4/13/2020   10:38 AM

## 2020-04-15 ENCOUNTER — NURSE TRIAGE (OUTPATIENT)
Dept: INTERNAL MEDICINE | Facility: OTHER | Age: 35
End: 2020-04-15

## 2020-04-15 ENCOUNTER — HOSPITAL ENCOUNTER (EMERGENCY)
Facility: OTHER | Age: 35
Discharge: HOME | End: 2020-04-15
Attending: PHYSICIAN ASSISTANT | Admitting: PHYSICIAN ASSISTANT
Payer: COMMERCIAL

## 2020-04-15 ENCOUNTER — APPOINTMENT (OUTPATIENT)
Dept: GENERAL RADIOLOGY | Facility: OTHER | Age: 35
End: 2020-04-15
Attending: PHYSICIAN ASSISTANT
Payer: COMMERCIAL

## 2020-04-15 ENCOUNTER — APPOINTMENT (OUTPATIENT)
Dept: ULTRASOUND IMAGING | Facility: OTHER | Age: 35
End: 2020-04-15
Attending: PHYSICIAN ASSISTANT
Payer: COMMERCIAL

## 2020-04-15 VITALS
OXYGEN SATURATION: 100 % | TEMPERATURE: 98.2 F | HEART RATE: 65 BPM | DIASTOLIC BLOOD PRESSURE: 84 MMHG | BODY MASS INDEX: 27.48 KG/M2 | RESPIRATION RATE: 17 BRPM | SYSTOLIC BLOOD PRESSURE: 122 MMHG | WEIGHT: 140 LBS | HEIGHT: 60 IN

## 2020-04-15 DIAGNOSIS — R07.2 PRECORDIAL PAIN: ICD-10-CM

## 2020-04-15 DIAGNOSIS — O09.90 HIGH-RISK PREGNANCY, UNSPECIFIED TRIMESTER: ICD-10-CM

## 2020-04-15 LAB
ALBUMIN SERPL-MCNC: 4 G/DL (ref 3.5–5.7)
ALP SERPL-CCNC: 65 U/L (ref 34–104)
ALT SERPL W P-5'-P-CCNC: 9 U/L (ref 7–52)
ANION GAP SERPL CALCULATED.3IONS-SCNC: 8 MMOL/L (ref 3–14)
AST SERPL W P-5'-P-CCNC: 13 U/L (ref 13–39)
BASOPHILS # BLD AUTO: 0 10E9/L (ref 0–0.2)
BASOPHILS NFR BLD AUTO: 0.4 %
BILIRUB SERPL-MCNC: 0.2 MG/DL (ref 0.3–1)
BUN SERPL-MCNC: 10 MG/DL (ref 7–25)
CALCIUM SERPL-MCNC: 8.8 MG/DL (ref 8.6–10.3)
CHLORIDE SERPL-SCNC: 107 MMOL/L (ref 98–107)
CO2 SERPL-SCNC: 22 MMOL/L (ref 21–31)
CREAT SERPL-MCNC: 0.75 MG/DL (ref 0.6–1.2)
DIFFERENTIAL METHOD BLD: NORMAL
EOSINOPHIL # BLD AUTO: 0.3 10E9/L (ref 0–0.7)
EOSINOPHIL NFR BLD AUTO: 3.8 %
ERYTHROCYTE [DISTWIDTH] IN BLOOD BY AUTOMATED COUNT: 12 % (ref 10–15)
FLUAV+FLUBV RNA SPEC QL NAA+PROBE: NEGATIVE
FLUAV+FLUBV RNA SPEC QL NAA+PROBE: NEGATIVE
GFR SERPL CREATININE-BSD FRML MDRD: 88 ML/MIN/{1.73_M2}
GLUCOSE SERPL-MCNC: 95 MG/DL (ref 70–105)
HCT VFR BLD AUTO: 41.2 % (ref 35–47)
HGB BLD-MCNC: 13.8 G/DL (ref 11.7–15.7)
IMM GRANULOCYTES # BLD: 0 10E9/L (ref 0–0.4)
IMM GRANULOCYTES NFR BLD: 0.3 %
INTERPRETATION ECG - MUSE: NORMAL
LYMPHOCYTES # BLD AUTO: 1.8 10E9/L (ref 0.8–5.3)
LYMPHOCYTES NFR BLD AUTO: 24.4 %
MCH RBC QN AUTO: 29.8 PG (ref 26.5–33)
MCHC RBC AUTO-ENTMCNC: 33.5 G/DL (ref 31.5–36.5)
MCV RBC AUTO: 89 FL (ref 78–100)
MONOCYTES # BLD AUTO: 0.4 10E9/L (ref 0–1.3)
MONOCYTES NFR BLD AUTO: 6 %
NEUTROPHILS # BLD AUTO: 4.7 10E9/L (ref 1.6–8.3)
NEUTROPHILS NFR BLD AUTO: 65.1 %
NT-PROBNP SERPL-MCNC: 42 PG/ML (ref 0–100)
PLATELET # BLD AUTO: 285 10E9/L (ref 150–450)
POTASSIUM SERPL-SCNC: 3.6 MMOL/L (ref 3.5–5.1)
PROT SERPL-MCNC: 7.2 G/DL (ref 6.4–8.9)
RBC # BLD AUTO: 4.63 10E12/L (ref 3.8–5.2)
RSV RNA SPEC NAA+PROBE: NEGATIVE
SODIUM SERPL-SCNC: 137 MMOL/L (ref 134–144)
SPECIMEN SOURCE: NORMAL
TROPONIN I SERPL-MCNC: <2.3 PG/ML
WBC # BLD AUTO: 7.3 10E9/L (ref 4–11)

## 2020-04-15 PROCEDURE — 83880 ASSAY OF NATRIURETIC PEPTIDE: CPT | Performed by: PHYSICIAN ASSISTANT

## 2020-04-15 PROCEDURE — 80053 COMPREHEN METABOLIC PANEL: CPT | Performed by: PHYSICIAN ASSISTANT

## 2020-04-15 PROCEDURE — 87631 RESP VIRUS 3-5 TARGETS: CPT | Performed by: PHYSICIAN ASSISTANT

## 2020-04-15 PROCEDURE — 93971 EXTREMITY STUDY: CPT

## 2020-04-15 PROCEDURE — 84484 ASSAY OF TROPONIN QUANT: CPT | Performed by: PHYSICIAN ASSISTANT

## 2020-04-15 PROCEDURE — 36415 COLL VENOUS BLD VENIPUNCTURE: CPT | Performed by: PHYSICIAN ASSISTANT

## 2020-04-15 PROCEDURE — 71045 X-RAY EXAM CHEST 1 VIEW: CPT

## 2020-04-15 PROCEDURE — 93010 ELECTROCARDIOGRAM REPORT: CPT | Performed by: INTERNAL MEDICINE

## 2020-04-15 PROCEDURE — 99283 EMERGENCY DEPT VISIT LOW MDM: CPT | Mod: Z6 | Performed by: PHYSICIAN ASSISTANT

## 2020-04-15 PROCEDURE — 99285 EMERGENCY DEPT VISIT HI MDM: CPT | Mod: 25 | Performed by: PHYSICIAN ASSISTANT

## 2020-04-15 PROCEDURE — 93005 ELECTROCARDIOGRAM TRACING: CPT | Performed by: PHYSICIAN ASSISTANT

## 2020-04-15 PROCEDURE — 85025 COMPLETE CBC W/AUTO DIFF WBC: CPT | Performed by: PHYSICIAN ASSISTANT

## 2020-04-15 RX ORDER — ASPIRIN 81 MG/1
81 TABLET, CHEWABLE ORAL DAILY
COMMUNITY
End: 2020-06-09

## 2020-04-15 RX ORDER — VITAMIN A ACETATE, .BETA.-CAROTENE, ASCORBIC ACID, CHOLECALCIFEROL, .ALPHA.-TOCOPHEROL ACETATE, DL-, THIAMINE MONONITRATE, RIBOFLAVIN, NIACINAMIDE, PYRIDOXINE HYDROCHLORIDE, FOLIC ACID, CYANOCOBALAMIN, CALCIUM CARBONATE, FERROUS FUMARATE, ZINC OXIDE, AND CUPRIC OXIDE 2000; 2000; 120; 400; 22; 1.84; 3; 20; 10; 1; 12; 200; 27; 25; 2 [IU]/1; [IU]/1; MG/1; [IU]/1; MG/1; MG/1; MG/1; MG/1; MG/1; MG/1; UG/1; MG/1; MG/1; MG/1; MG/1
1 TABLET ORAL DAILY
COMMUNITY

## 2020-04-15 ASSESSMENT — MIFFLIN-ST. JEOR: SCORE: 1256.54

## 2020-04-15 NOTE — ED TRIAGE NOTES
"Pt to ER with chest pain/pressure.  Is pregnant as surrogate. Increased IVF meds on Wednesday, sx started on Thursday.  Worse when sitting or supine.  5 weeks pregnant. Pain has worsened since last night, sharp pains that \"stopped her in her tracks\".  States it feels like \"when a kid sits on your chest and you try to talk.\" No abdominal or vaginal discomfort or discharge. Went in for lab work on Monday, was told to go to ER with pressure did not subside or worsened.  "

## 2020-04-15 NOTE — ED PROVIDER NOTES
History     Chief Complaint   Patient presents with     Chest Pain     HPI  Nato Bradley is a 35 year old female who presents for evaluation of chest discomfort since Friday.  No significant shortness of breath she describes it as pressure-like when her children sit on her chest.  Her pregnancy was from IVF and she is a surrogate.  She has noted that she is had increased anxiety from this pressure.  She is not had a recent travel or calf tenderness she has been afebrile she has not had any exposures.  She does not have any headaches dizziness no visual disturbances no runny nose nasal congestion sinus pressure sore throat or cough cough no abdominal pain diarrhea constipation no vaginal bleeding or discharge she denies any dysuria.  But she is concerned about this ongoing pressure.  She currently rates it 3 out of 10 at times it is 0-1 out of 10 and noticeable.  She does not wake up in the evening with a sore throat.  She does not recall any reflux symptoms.    Allergies:  No Known Allergies    Problem List:    Patient Active Problem List    Diagnosis Date Noted     Anxiety and depression 2019     Priority: Medium     Depressive disorder 2006     Priority: Medium        Past Medical History:    Past Medical History:   Diagnosis Date     Personal history of other mental and behavioral disorders        Past Surgical History:    Past Surgical History:   Procedure Laterality Date      SECTION      No Comments Provided     EXTRACTION(S) DENTAL      No Comments Provided       Family History:    Family History   Problem Relation Age of Onset     Diabetes Mother         Diabetes     Hypertension Mother         Hypertension     Thyroid Disease Mother         Thyroid Disease     Thyroid Disease Sister         hashimoto's (dx 2018)     Heart Disease Maternal Grandmother         Heart Disease     Hypertension Maternal Grandmother         Hypertension     Lung Cancer Maternal Grandmother         ?  "cervical cancer in 20s.      Heart Disease Maternal Grandfather         Heart Disease     Prostate Cancer Maternal Grandfather         Cancer-prostate     Diabetes Maternal Grandfather         Diabetes     Hypertension Maternal Grandfather         Hypertension     Hypertension Maternal Aunt         Hypertension     Thyroid Disease Maternal Aunt         Thyroid Disease     Hypertension Paternal Aunt         Hypertension     Breast Cancer No family hx of         Cancer-breast     Colon Cancer No family hx of         Cancer-colon     Ovarian Cancer No family hx of         Cancer-ovarian     Other - See Comments No family hx of         Stroke     Blood Disease No family hx of         Blood Disease       Social History:  Marital Status:   [2]  Social History     Tobacco Use     Smoking status: Never Smoker     Smokeless tobacco: Never Used   Substance Use Topics     Alcohol use: Not Currently     Alcohol/week: 0.0 standard drinks     Comment: occasional     Drug use: No        Medications:    aspirin (ASA) 81 MG chewable tablet  estradiol (ESTRACE) 2 MG tablet  Prenatal Vit-Fe Fumarate-FA (PNV PRENATAL PLUS MULTIVITAMIN) 27-1 MG TABS per tablet  progesterone, in olive oil, 100 MG/ML CMPD injection  B-D HYPODERMIC NEEDLE 22G X 1-1/2\" MISC  BD INSULIN SYRINGE ULTRAFINE 31G X 5/16\" 0.5 ML miscellaneous  BD [employer] Home MISC  Needle, Disp, (HYPODERMIC NEEDLE) 27G X 1/2\" MISC          Review of Systems    Pertinent positives and negatives are as above in the HPI. 10 point review of systems is otherwise negative.    Physical Exam   BP: (!) 148/80  Pulse: 97  Temp: 98.2  F (36.8  C)  Resp: 20  Height: 152.4 cm (5')  Weight: 63.5 kg (140 lb)  SpO2: 100 %      Physical Exam   Exam:  Constitutional: healthy, alert and no distress  Head: Normocephalic. No masses, lesions, tenderness or abnormalities  Neck: Neck supple. No adenopathy. Thyroid symmetric, normal size,, Carotids without bruits.  ENT: ENT exam normal, no " neck nodes or sinus tenderness  Cardiovascular: negative, PMI normal. No lifts, heaves, or thrills. RRR. No murmurs, clicks gallops or rub  Respiratory: negative, Percussion normal. Good diaphragmatic excursion. Lungs clear  Gastrointestinal: Abdomen soft, non-tender. BS normal. No masses, organomegaly  : Deferred  Musculoskeletal: extremities normal- no gross deformities noted, gait normal and normal muscle tone, Homans sign negative and there is no calf tenderness on examination  Skin: no suspicious lesions or rashes  Neurologic: Gait normal. Reflexes normal and symmetric. Sensation grossly WNL.  Psychiatric: mentation appears normal and affect normal/bright  Hematologic/Lymphatic/Immunologic: Normal cervical lymph nodes      ED Course        Procedures               EKG Interpretation:      Interpreted by Beny Giang PA-C  Time reviewed: 11:27  Symptoms at time of EKG: NONE   Rhythm: normal sinus   Rate: 62  Axis: normal  Ectopy: none  Conduction: normal  ST Segments/ T Waves: No ST-T wave changes  Q Waves: none  Clinical Impression: normal EKG          Critical Care time:  none               Results for orders placed or performed during the hospital encounter of 04/15/20 (from the past 24 hour(s))   EKG 12 lead   Result Value Ref Range    Interpretation ECG Click View Image link to view waveform and result    CBC with platelets differential   Result Value Ref Range    WBC 7.3 4.0 - 11.0 10e9/L    RBC Count 4.63 3.8 - 5.2 10e12/L    Hemoglobin 13.8 11.7 - 15.7 g/dL    Hematocrit 41.2 35.0 - 47.0 %    MCV 89 78 - 100 fl    MCH 29.8 26.5 - 33.0 pg    MCHC 33.5 31.5 - 36.5 g/dL    RDW 12.0 10.0 - 15.0 %    Platelet Count 285 150 - 450 10e9/L    Diff Method Automated Method     % Neutrophils 65.1 %    % Lymphocytes 24.4 %    % Monocytes 6.0 %    % Eosinophils 3.8 %    % Basophils 0.4 %    % Immature Granulocytes 0.3 %    Absolute Neutrophil 4.7 1.6 - 8.3 10e9/L    Absolute Lymphocytes 1.8 0.8 - 5.3 10e9/L     Absolute Monocytes 0.4 0.0 - 1.3 10e9/L    Absolute Eosinophils 0.3 0.0 - 0.7 10e9/L    Absolute Basophils 0.0 0.0 - 0.2 10e9/L    Abs Immature Granulocytes 0.0 0 - 0.4 10e9/L   Comprehensive metabolic panel   Result Value Ref Range    Sodium 137 134 - 144 mmol/L    Potassium 3.6 3.5 - 5.1 mmol/L    Chloride 107 98 - 107 mmol/L    Carbon Dioxide 22 21 - 31 mmol/L    Anion Gap 8 3 - 14 mmol/L    Glucose 95 70 - 105 mg/dL    Urea Nitrogen 10 7 - 25 mg/dL    Creatinine 0.75 0.60 - 1.20 mg/dL    GFR Estimate 88 >60 mL/min/[1.73_m2]    GFR Estimate If Black >90 >60 mL/min/[1.73_m2]    Calcium 8.8 8.6 - 10.3 mg/dL    Bilirubin Total 0.2 (L) 0.3 - 1.0 mg/dL    Albumin 4.0 3.5 - 5.7 g/dL    Protein Total 7.2 6.4 - 8.9 g/dL    Alkaline Phosphatase 65 34 - 104 U/L    ALT 9 7 - 52 U/L    AST 13 13 - 39 U/L   Troponin GH (now)   Result Value Ref Range    Troponin <2.3 <34.0 pg/mL   NT pro BNP   Result Value Ref Range    N-Terminal Pro BNP Inpatient 42 0 - 100 pg/mL   Influenza A and B and RSV PCR   Result Value Ref Range    Specimen Description Nasopharyngeal     Influenza A PCR Negative NEG^Negative    Influenza B PCR Negative NEG^Negative    Resp Syncytial Virus Negative NEG^Negative   US Lower Ext Venous Duplex Limited Bilat    Narrative    Procedure: US LOWER EXT VENOUS DUPLEX LIMITED BILAT    HISTORY:  pregnancy, chest pain elevated d dimer.    TECHNIQUE: Grayscale, color Doppler and compression views of the deep  venous structures of the bilateral lower extremities.    COMPARISON: None.    FINDINGS:    Interrogation of the deep venous structures from the bilateral common  femoral vein through the veins of the proximal calf demonstrate normal  grayscale appearance, compressibility and augmentable color Doppler  flow.      Impression    IMPRESSION:     No evidence of lower extremity DVT.      JASWANT COPELAND MD   XR Chest 1 View    Narrative    XR CHEST 1 VW    HISTORY: 35 yearsFemale pressure    TECHNIQUE: A single  view of the chest was performed    COMPARISON: 1/5/2015    FINDINGS: Heart size and pulmonary vascularity are within normal  limits. Lungs are clear. No consolidating airspace opacities are  present.        Impression    IMPRESSION: Clear chest    LAKEISHA PRADO MD       Medications - No data to display    Assessments & Plan (with Medical Decision Making)     I have reviewed the nursing notes.    I have reviewed the findings, diagnosis, plan and need for follow up with the patient.  Differential diagnosis at this point include: acute coronary syndromes, acute aortic dissection, pulmonary embolism, pneumothorax, pneumonia, non-emergent sources of chest wall pain, pericarditis and myocarditis, pregnancy-induced cardiomyopathy as well as other etiologies.    Pleasant 35-year-old female presents emerged pump this evening for evaluation her imaging is nonrevealing for any acute pathology based on chest x-ray 1 view.  She also had ultrasound of her bilateral lower extremities there is no evidence of any DVT.  With this in mind I encourage her close follow-up in 1 week for repeat ultrasound to evaluate the patency of the deep venous system.  Her laboratory studies are nonrevealing her EKG is unremarkable.  Given the concern of pulmonary embolus it was recommended that the patient to do have more advanced imaging with VQ scan.  She is going to contact the mother of the child as well as her clinic who did the IVF in [place] to consult with them.  We would not be able to get the VQ scan until tomorrow as the reagents have to come up from [facility].  So we will schedule her for the VQ scan tomorrow.  This VQ scan will be followed up by her primary care physician or the emergency department.  If VQ scan is negative that will be reassuring to the patient.  Nato should contact her primary care physician today and set up an appointment for follow-up.  If symptoms worsen if this further concerns she should immediately return  to the emergency department. Tylenol for pain.   I explained my diagnostic considerations and recommendations and the patient voiced an understanding and was in agreement with the treatment plan. All questions were answered. We discussed potential side effects of any prescribed or recommended therapies, as well as expectations for response to treatments.         New Prescriptions    No medications on file       Final diagnoses:   Precordial pain   High-risk pregnancy, unspecified trimester       4/15/2020   [facility]     Beny Giang PA-C  04/15/20 1319

## 2020-04-15 NOTE — ED AVS SNAPSHOT
[facility]  [address]  [place] [address]  Phone:  [phone]  Fax:  [phone]                                    Nato Bradley   MRN: [id number]    Department:  [facility]   Date of Visit:  4/15/2020           After Visit Summary Signature Page    I have received my discharge instructions, and my questions have been answered. I have discussed any challenges I see with this plan with the nurse or doctor.    ..........................................................................................................................................  Patient/Patient Representative Signature      ..........................................................................................................................................  Patient Representative Print Name and Relationship to Patient    ..................................................               ................................................  Date                                   Time    ..........................................................................................................................................  Reviewed by Signature/Title    ...................................................              ..............................................  Date                                               Time          22EPIC Rev 08/18

## 2020-04-15 NOTE — TELEPHONE ENCOUNTER
"  Answer Assessment - Initial Assessment Questions  1. LOCATION: \"Where does it hurt?\"        Chest  2. RADIATION: \"Does the pain go anywhere else?\" (e.g., into neck, jaw, arms, back)      No  3. ONSET: \"When did the chest pain begin?\" (Minutes, hours or days)       Over several days ago  4. PATTERN \"Does the pain come and go, or has it been constant since it started?\"  \"Does it get worse with exertion?\"       Intermittent   5. DURATION: \"How long does it last\" (e.g., seconds, minutes, hours)      Minutes  6. SEVERITY: \"How bad is the pain?\"  (e.g., Scale 1-10; mild, moderate, or severe)     - MILD (1-3): doesn't interfere with normal activities      - MODERATE (4-7): interferes with normal activities or awakens from sleep     - SEVERE (8-10): excruciating pain, unable to do any normal activities        Rates it at a 5.  7. CARDIAC RISK FACTORS: \"Do you have any history of heart problems or risk factors for heart disease?\" (e.g., prior heart attack, angina; high blood pressure, diabetes, being overweight, high cholesterol, smoking, or strong family history of heart disease)        8. PULMONARY RISK FACTORS: \"Do you have any history of lung disease?\"  (e.g., blood clots in lung, asthma, emphysema, birth control pills)        9. CAUSE: \"What do you think is causing the chest pain?\"      Unknown but concerned about PE  10. OTHER SYMPTOMS: \"Do you have any other symptoms?\" (e.g., dizziness, nausea, vomiting, sweating, fever, difficulty breathing, cough)        SOB, feels like \"I ran up a hill\".   11. PREGNANCY: \"Is there any chance you are pregnant?\" \"When was your last menstrual period?\"        5 wks pregnant is a surrogate.    Protocols used: CHEST PAIN-A-[place]    "

## 2020-04-16 ENCOUNTER — TELEPHONE (OUTPATIENT)
Dept: OBGYN | Facility: OTHER | Age: 35
End: 2020-04-16

## 2020-04-16 ENCOUNTER — HOSPITAL ENCOUNTER (OUTPATIENT)
Dept: NUCLEAR MEDICINE | Facility: OTHER | Age: 35
Discharge: HOME | End: 2020-04-16
Attending: PHYSICIAN ASSISTANT | Admitting: PHYSICIAN ASSISTANT
Payer: COMMERCIAL

## 2020-04-16 ENCOUNTER — TELEPHONE (OUTPATIENT)
Dept: FAMILY MEDICINE | Facility: OTHER | Age: 35
End: 2020-04-16

## 2020-04-16 PROCEDURE — A9540 TC99M MAA: HCPCS | Performed by: PHYSICIAN ASSISTANT

## 2020-04-16 PROCEDURE — 78582 LUNG VENTILAT&PERFUS IMAGING: CPT

## 2020-04-16 PROCEDURE — 34300033 ZZH RX 343: Performed by: PHYSICIAN ASSISTANT

## 2020-04-16 PROCEDURE — A9567 TECHNETIUM TC-99M AEROSOL: HCPCS | Performed by: PHYSICIAN ASSISTANT

## 2020-04-16 RX ADMIN — KIT FOR THE PREPARATION OF TECHNETIUM TC 99M ALBUMIN AGGREGATED 5.05 MILLICURIE: 2.5 INJECTION, POWDER, FOR SOLUTION INTRAVENOUS at 11:45

## 2020-04-16 RX ADMIN — KIT FOR THE PREPARATION OF TECHNETIUM TC 99M PENTETATE 29.4 MILLICURIE: 20 INJECTION, POWDER, LYOPHILIZED, FOR SOLUTION INTRAVENOUS; RESPIRATORY (INHALATION) at 11:15

## 2020-04-16 NOTE — TELEPHONE ENCOUNTER
Notified patient of response per Rosa Stafford MD and she verbally understood.    Flori Abernathy LPN............4/16/2020 4:25 PM

## 2020-04-16 NOTE — TELEPHONE ENCOUNTER
Call and notify --    Normal; low probability of any blood clot in the lungs.    Rosa Stafford, DO

## 2020-04-16 NOTE — TELEPHONE ENCOUNTER
Patient called today and needs to know her lung scan results. Please call patient back.    Thank you.

## 2020-04-16 NOTE — TELEPHONE ENCOUNTER
Patient had office visit 4-13-20, with Mariam De Los Santos MD and ER visit yesterday.  Scan completed today.      Examination:NM LUNG SCAN VENTILATION AND PERFUSION, 4/16/2020 12:05 PM        Indication:  PE suspected, intermediate prob, positive D-dimer; COPY  RESULTS TO PRIMARY PROVIDER OR CALL RESULTS TO THE ED PROVIDER      Additional Information: none     Technique:     The patient received 29.4 mCi of Tc-99m DTPA aerosol for ventilation  and 5.05 mCi of Tc-99m MAA for perfusion. Multiple images were  obtained of the lungs in Anterior, posterior, WEINER, RPO, LPO, and  N[place]ian  projections.     Comparison: Chest x-ray 4/15/2020     Findings:     The ventilation study demonstrates normal ventilation.     The perfusion study demonstrates normal perfusion.                                                                      Impression:     1. Normal study. No evidence of pulmonary embolism.     MD Keke AMATO LPN 4/16/2020 3:53 PM

## 2020-04-16 NOTE — TELEPHONE ENCOUNTER
Left message to return call to unit 4. Martha Rich LPN .......................4/16/2020  4:10 PM

## 2020-04-16 NOTE — TELEPHONE ENCOUNTER
Patient called to see if it was safe and necessary to complete the recommended VQ scan today. Reviewed with Dr. Hernandez who advises patient should complete this test. Patient verbalized understanding and agreement.    Tigist Frost RN...................4/16/2020 8:39 AM

## 2020-04-20 ENCOUNTER — MEDICAL CORRESPONDENCE (OUTPATIENT)
Dept: HEALTH INFORMATION MANAGEMENT | Facility: OTHER | Age: 35
End: 2020-04-20

## 2020-04-20 DIAGNOSIS — O09.811 SUPERVISION OF PREGNANCY RESULTING FROM ASSISTED REPRODUCTIVE TECHNOLOGY IN FIRST TRIMESTER: Primary | ICD-10-CM

## 2020-04-20 LAB
B-HCG SERPL-ACNC: NORMAL IU/L
ESTRADIOL SERPL-MCNC: 275 PG/ML
PROGEST SERPL-MCNC: 50.4 NG/ML

## 2020-04-20 PROCEDURE — 36415 COLL VENOUS BLD VENIPUNCTURE: CPT | Mod: ZL

## 2020-04-20 PROCEDURE — 84702 CHORIONIC GONADOTROPIN TEST: CPT | Mod: ZL

## 2020-04-20 PROCEDURE — 84144 ASSAY OF PROGESTERONE: CPT | Mod: ZL

## 2020-04-20 PROCEDURE — 82670 ASSAY OF TOTAL ESTRADIOL: CPT | Mod: ZL

## 2020-04-28 ENCOUNTER — HOSPITAL ENCOUNTER (OUTPATIENT)
Dept: ULTRASOUND IMAGING | Facility: OTHER | Age: 35
Discharge: HOME | End: 2020-04-28
Attending: OBSTETRICS & GYNECOLOGY | Admitting: FAMILY MEDICINE

## 2020-04-28 ENCOUNTER — TRANSFERRED RECORDS (OUTPATIENT)
Dept: HEALTH INFORMATION MANAGEMENT | Facility: OTHER | Age: 35
End: 2020-04-28

## 2020-04-28 DIAGNOSIS — O09.811 ENCOUNTER FOR SUPERVISION OF PREGNANCY RESULTING FROM ASSISTED REPRODUCTIVE TECHNOLOGY IN FIRST TRIMESTER: ICD-10-CM

## 2020-04-28 PROCEDURE — 76817 TRANSVAGINAL US OBSTETRIC: CPT

## 2020-05-12 ENCOUNTER — PRENATAL OFFICE VISIT (OUTPATIENT)
Dept: OBGYN | Facility: OTHER | Age: 35
End: 2020-05-12
Attending: OBSTETRICS & GYNECOLOGY
Payer: COMMERCIAL

## 2020-05-12 VITALS
HEART RATE: 68 BPM | BODY MASS INDEX: 26.83 KG/M2 | DIASTOLIC BLOOD PRESSURE: 80 MMHG | SYSTOLIC BLOOD PRESSURE: 118 MMHG | WEIGHT: 137.4 LBS

## 2020-05-12 DIAGNOSIS — Z33.3 PREGNANT STATE, GESTATIONAL CARRIER: ICD-10-CM

## 2020-05-12 DIAGNOSIS — O09.811 PREGNANCY RESULTING FROM IN VITRO FERTILIZATION IN FIRST TRIMESTER: Primary | ICD-10-CM

## 2020-05-12 PROCEDURE — 99207 ZZC OB VISIT-NO CHARGE - GICH ONLY: CPT | Performed by: OBSTETRICS & GYNECOLOGY

## 2020-05-12 PROCEDURE — 87086 URINE CULTURE/COLONY COUNT: CPT | Mod: ZL | Performed by: OBSTETRICS & GYNECOLOGY

## 2020-05-12 ASSESSMENT — PAIN SCALES - GENERAL: PAINLEVEL: NO PAIN (0)

## 2020-05-12 NOTE — NURSING NOTE
Patient here for prenatal care. States she does not have questions or concerns.     Medication Reconciliation: complete    Uvaldo Al LPN  5/12/2020 8:49 AM

## 2020-05-12 NOTE — PROGRESS NOTES
"New Obstetrics Visit    HPI: 35 year old  at 9w0d by IVF dating here today for initial OB visit. She conceived the first cycle of IVF. She is a surrogate for a couple in [place]. Egg donor is 35 years of age. She denies cramping or VB. Was seen in ED 4/15 for chest pain, ruled out for PE. Has been feeling well since.    OBHx  OB History    Para Term  AB Living   6 4 4 0 1 4   SAB TAB Ectopic Multiple Live Births   0 1 0 0 4      # Outcome Date GA Lbr Jovi/2nd Weight Sex Delivery Anes PTL Lv   6 Current            5 Term 10/28/11    M Vag-Spont EPI N MT      Name: Jim   4 Term 03    M Vag-Spont EPI Y MT      Name: sebastian jeronimo   3 TAB            2 Term     F  EPI N MT   1 Term     M -SEC EPI N MT      Complications: Breech presentation         PMHx:   Past Medical History:   Diagnosis Date     Personal history of other mental and behavioral disorders     No Comments Provided      PSHx:   Past Surgical History:   Procedure Laterality Date      SECTION      No Comments Provided     EXTRACTION(S) DENTAL      No Comments Provided      Meds:   Current Outpatient Medications   Medication     aspirin (ASA) 81 MG chewable tablet     B-D HYPODERMIC NEEDLE 22G X 1-1/2\" MISC     BD INSULIN SYRINGE ULTRAFINE 31G X /16\" 0.5 ML miscellaneous     BD [employer] MISC     estradiol (ESTRACE) 2 MG tablet     Needle, Disp, (HYPODERMIC NEEDLE) 27G X 1/2\" MISC     Prenatal Vit-Fe Fumarate-FA (PNV PRENATAL PLUS MULTIVITAMIN) 27-1 MG TABS per tablet     progesterone, in olive oil, 100 MG/ML CMPD injection     No current facility-administered medications for this visit.      Allergies:   No Known Allergies    SocHx:   Social History     Tobacco Use     Smoking status: Never Smoker     Smokeless tobacco: Never Used   Substance Use Topics     Alcohol use: Not Currently     Alcohol/week: 0.0 standard drinks     Comment: occasional     Drug use: No     Lives with her  " and 4 kids. Is a stay at home mom    FamHx:   Family History   Problem Relation Age of Onset     Diabetes Mother         Diabetes     Hypertension Mother         Hypertension     Thyroid Disease Mother         Thyroid Disease     Thyroid Disease Sister         hashimoto's (dx 2018)     Heart Disease Maternal Grandmother         Heart Disease     Hypertension Maternal Grandmother         Hypertension     Lung Cancer Maternal Grandmother         ? cervical cancer in 20s.      Heart Disease Maternal Grandfather         Heart Disease     Prostate Cancer Maternal Grandfather         Cancer-prostate     Diabetes Maternal Grandfather         Diabetes     Hypertension Maternal Grandfather         Hypertension     Hypertension Maternal Aunt         Hypertension     Thyroid Disease Maternal Aunt         Thyroid Disease     Hypertension Paternal Aunt         Hypertension     Breast Cancer No family hx of         Cancer-breast     Colon Cancer No family hx of         Cancer-colon     Ovarian Cancer No family hx of         Cancer-ovarian     Other - See Comments No family hx of         Stroke     Blood Disease No family hx of         Blood Disease        ROS: 10-Point ROS negative except as noted in HPI      Physical Exam  /80 (BP Location: Right arm, Patient Position: Sitting, Cuff Size: Adult Regular)   Pulse 68   Wt 62.3 kg (137 lb 6.4 oz)   LMP 2020 (Exact Date)   BMI 26.83 kg/m    Body mass index is 26.83 kg/m .  Gen: Well-appearing, NAD  HEENT: Normocephalic, atraumatic  Neck: Thyroid is not enlarged, no appreciable masses palpated. Non-tender  CV:  RRR, no m/r/g auscultated  Pulm: CTAB, no w/r/r auscultated  Abd: Soft, non-tender, non-distended  Ext: No LE edema, extremities warm and well perfused  Pelvic:  deferred      Assessment/Plan:  Ms. Nato Bradley is a 35 year old  at 9w0d by IVF dating, here for new OB visit. Pregnancy is complicated by gestational carrier, egg donor AMA, history of   section.  1. Gestational carrier: plans deliver in [place] for Level 3 NICU  2. History of  section: plans   3. Egg donor is AMA: plan [place] at next visit, will need Level 2 US, fetal echo  4. New OB labs reportedly drawn at fertility clinic- will obtain records  5. Imaging: dating US at 7w0d  6. Immunizations: none    Follow up in 4 weeks.    Peri Henry MD  OB/GYN  2020 9:11 AM

## 2020-05-14 LAB
BACTERIA SPEC CULT: NO GROWTH
SPECIMEN SOURCE: NORMAL

## 2020-06-09 ENCOUNTER — PRENATAL OFFICE VISIT (OUTPATIENT)
Dept: OBGYN | Facility: OTHER | Age: 35
End: 2020-06-09
Attending: OBSTETRICS & GYNECOLOGY
Payer: COMMERCIAL

## 2020-06-09 VITALS
OXYGEN SATURATION: 99 % | SYSTOLIC BLOOD PRESSURE: 98 MMHG | DIASTOLIC BLOOD PRESSURE: 68 MMHG | BODY MASS INDEX: 26.72 KG/M2 | WEIGHT: 136.8 LBS | RESPIRATION RATE: 18 BRPM | HEART RATE: 67 BPM

## 2020-06-09 DIAGNOSIS — O09.811 PREGNANCY RESULTING FROM IN VITRO FERTILIZATION IN FIRST TRIMESTER: Primary | ICD-10-CM

## 2020-06-09 PROCEDURE — 99207 ZZC OB VISIT-NO CHARGE - GICH ONLY: CPT | Performed by: OBSTETRICS & GYNECOLOGY

## 2020-06-09 ASSESSMENT — PAIN SCALES - GENERAL: PAINLEVEL: NO PAIN (0)

## 2020-06-09 NOTE — NURSING NOTE
Chief Complaint   Patient presents with     Prenatal Care     13w       Initial BP 98/68 (BP Location: Right arm, Patient Position: Sitting, Cuff Size: Adult Regular)   Pulse 67   Resp 18   Wt 62.1 kg (136 lb 12.8 oz)   LMP 03/05/2020 (Exact Date)   SpO2 99%   BMI 26.72 kg/m   Estimated body mass index is 26.72 kg/m  as calculated from the following:    Height as of 4/15/20: 1.524 m (5').    Weight as of this encounter: 62.1 kg (136 lb 12.8 oz).  Medication Reconciliation: complete    Luana Huddleston LPN

## 2020-06-09 NOTE — PROGRESS NOTES
Return OB Visit    S: Patient is feeling well. No cramping or VB.     O: BP 98/68 (BP Location: Right arm, Patient Position: Sitting, Cuff Size: Adult Regular)   Pulse 67   Resp 18   Wt 62.1 kg (136 lb 12.8 oz)   LMP 2020 (Exact Date)   SpO2 99%   BMI 26.72 kg/m    Gen: Well-appearing, NAD  See OB Flowsheet    A/P:  Nato Bradley is a 35 year old  at 13w0d by IVF dating, here for return OB visit.  Gestational carrier: egg donor AMA, plans delivery in [place] for Level 3 NICU. Needs level 2 US, fetal echo.   History of  section: plans     PNC: still waiting on labs from fertility clinic, will contact again today  Genetics: [place] 2020   Imaging: dating US at 7w0d  Immunizations: none  RTC 4 weeks    Peri Henry MD  OB/GYN  2020 8:40 AM

## 2020-06-18 ENCOUNTER — TELEPHONE (OUTPATIENT)
Dept: OBGYN | Facility: OTHER | Age: 35
End: 2020-06-18

## 2020-06-18 NOTE — TELEPHONE ENCOUNTER
Patient name and date of birth verified. After verification, patient was informed that harmony results were normal.     Uvaldo Al, LPN 6/18/2020 11:32 AM

## 2020-07-15 ENCOUNTER — HOSPITAL ENCOUNTER (OUTPATIENT)
Dept: ULTRASOUND IMAGING | Facility: OTHER | Age: 35
End: 2020-07-15
Attending: OBSTETRICS & GYNECOLOGY
Payer: COMMERCIAL

## 2020-07-15 ENCOUNTER — OFFICE VISIT (OUTPATIENT)
Dept: MATERNAL FETAL MEDICINE | Facility: CLINIC | Age: 35
End: 2020-07-15
Attending: OBSTETRICS & GYNECOLOGY
Payer: COMMERCIAL

## 2020-07-15 ENCOUNTER — PRENATAL OFFICE VISIT (OUTPATIENT)
Dept: OBGYN | Facility: OTHER | Age: 35
End: 2020-07-15
Attending: OBSTETRICS & GYNECOLOGY
Payer: COMMERCIAL

## 2020-07-15 ENCOUNTER — HOSPITAL ENCOUNTER (OUTPATIENT)
Dept: ULTRASOUND IMAGING | Facility: CLINIC | Age: 35
End: 2020-07-15
Attending: OBSTETRICS & GYNECOLOGY
Payer: COMMERCIAL

## 2020-07-15 VITALS
HEART RATE: 60 BPM | BODY MASS INDEX: 27.15 KG/M2 | WEIGHT: 139 LBS | DIASTOLIC BLOOD PRESSURE: 76 MMHG | SYSTOLIC BLOOD PRESSURE: 118 MMHG

## 2020-07-15 DIAGNOSIS — O09.811 PREGNANCY RESULTING FROM IN VITRO FERTILIZATION IN FIRST TRIMESTER: ICD-10-CM

## 2020-07-15 DIAGNOSIS — O09.812 PREGNANCY RESULTING FROM IN VITRO FERTILIZATION IN SECOND TRIMESTER: Primary | ICD-10-CM

## 2020-07-15 DIAGNOSIS — O09.811 PREGNANCY RESULTING FROM IN VITRO FERTILIZATION, FIRST TRIMESTER: Primary | ICD-10-CM

## 2020-07-15 PROCEDURE — 99207 ZZC OB VISIT-NO CHARGE - GICH ONLY: CPT | Performed by: OBSTETRICS & GYNECOLOGY

## 2020-07-15 ASSESSMENT — PAIN SCALES - GENERAL: PAINLEVEL: NO PAIN (0)

## 2020-07-15 NOTE — PROGRESS NOTES
Return OB Visit    S: Patient is feeling well. No cramping or VB. +FM.    O: /76 (BP Location: Right arm, Patient Position: Sitting, Cuff Size: Adult Large)   Pulse 60   Wt 63 kg (139 lb)   LMP 2020 (Exact Date)   Breastfeeding No   BMI 27.15 kg/m    Gen: Well-appearing, NAD  See OB Flowsheet    A/P:  Nato Bradley is a 35 year old  at 18w1d by IVF dating, here for return OB visit.  Gestational carrier: egg donor AMA, plans delivery in [place] for Level 3 NICU. Ord'd fetal echo to be done in [place] in 4-6 weeks  History of  section: plans      PNC: labs received- all normal,  O positive, antibody negative, rubella immune, HIV NR, HepB NR, RPR NR, HCV negative, GC/Chlam negative  Genetics: [place] normal   Imaging: dating US at 7w0d, Level 2 US pending from today  Immunizations: none  RTC 4 weeks    Peri Henry MD  OB/GYN  7/15/2020 9:55 AM

## 2020-07-15 NOTE — NURSING NOTE
Chief Complaint   Patient presents with     Prenatal Care     18w1d     Offers no complaints   Belkis Manzo LPN........................7/15/2020  9:34 AM   Medication Reconciliation: completed   Belkis Manzo LPN  7/15/2020 9:34 AM

## 2020-07-16 NOTE — PROGRESS NOTES
Type of service:    Telemedicine Office Visit for Fetal Ultrasound    Date of service:     Date: 07/15/20     Time service began:    8:30 AM  Time service ended:    9:30 AM    Reason:      : Lack of available service in patient area    Description of basis or telemedicine appropriateness:     Consultation provided at the request of Dr. Henry for advice regarding the diagnosis and treatment of this patient's pregnancy. The patient's condition can be safely assessed via telemedicine.    The Mode of Transmission:    Secure interactive audio and visual telecommunication system (Video Guidance)    Location of originating and distant sites:      Originating site:   [place], [place]    Distant site:    [place], [place]      Jany Mclean MD  Maternal-Fetal Medicine

## 2020-08-12 ENCOUNTER — PRENATAL OFFICE VISIT (OUTPATIENT)
Dept: OBGYN | Facility: OTHER | Age: 35
End: 2020-08-12
Attending: OBSTETRICS & GYNECOLOGY
Payer: COMMERCIAL

## 2020-08-12 VITALS
SYSTOLIC BLOOD PRESSURE: 104 MMHG | WEIGHT: 141 LBS | DIASTOLIC BLOOD PRESSURE: 60 MMHG | HEART RATE: 60 BPM | BODY MASS INDEX: 27.54 KG/M2

## 2020-08-12 DIAGNOSIS — O09.812 PREGNANCY RESULTING FROM IN VITRO FERTILIZATION IN SECOND TRIMESTER: Primary | ICD-10-CM

## 2020-08-12 PROCEDURE — 99207 ZZC OB VISIT-NO CHARGE - GICH ONLY: CPT | Performed by: OBSTETRICS & GYNECOLOGY

## 2020-08-12 ASSESSMENT — PAIN SCALES - GENERAL: PAINLEVEL: NO PAIN (0)

## 2020-08-12 NOTE — NURSING NOTE
Chief Complaint   Patient presents with     Prenatal Care     22w1d     Offers no complaints   Belkis Manzo LPN........................8/12/2020  12:57 PM     Medication Reconciliation: completed   Belkis Manzo LPN  8/12/2020 12:57 PM

## 2020-08-12 NOTE — PROGRESS NOTES
Return OB Visit    S: Patient is feeling well. No cramping or VB. +FM    O: /60 (BP Location: Right arm, Patient Position: Sitting, Cuff Size: Adult Large)   Pulse 60   Wt 64 kg (141 lb)   LMP 2020 (Exact Date)   Breastfeeding No   BMI 27.54 kg/m    Gen: Well-appearing, NAD  See OB Flowsheet    A/P:  Nato Bradley is a 35 year old  at 22w1d by IVF dating, here for return OB visit.  Gestational carrier: egg donor AMA, plans delivery in [place] for Level 3 NICU. Ord'd fetal echo to be done in [place] in 4-6 weeks  History of  section: plans . Desires consult in [place] around 32 weeks to possibly schedule induction, discuss legal concerns, surrogate parents at delivery, etc     PNC: labs received- all normal,  O positive, antibody negative, rubella immune, HIV NR, HepB NR, RPR NR, HCV negative, GC/Chlam negative  Genetics: [place] normal   Imaging: dating US at 7w0d, Level 2 US normal except limited heart views  Immunizations: none  RTC 4 weeks    Peri Henry MD  OB/GYN  2020 1:05 PM

## 2020-08-17 ENCOUNTER — TRANSFERRED RECORDS (OUTPATIENT)
Dept: HEALTH INFORMATION MANAGEMENT | Facility: OTHER | Age: 35
End: 2020-08-17

## 2020-09-08 ENCOUNTER — PRENATAL OFFICE VISIT (OUTPATIENT)
Dept: OBGYN | Facility: OTHER | Age: 35
End: 2020-09-08
Attending: OBSTETRICS & GYNECOLOGY
Payer: COMMERCIAL

## 2020-09-08 VITALS
SYSTOLIC BLOOD PRESSURE: 102 MMHG | BODY MASS INDEX: 28.12 KG/M2 | HEART RATE: 72 BPM | DIASTOLIC BLOOD PRESSURE: 66 MMHG | WEIGHT: 144 LBS

## 2020-09-08 DIAGNOSIS — O09.812 PREGNANCY RESULTING FROM IN VITRO FERTILIZATION IN SECOND TRIMESTER: Primary | ICD-10-CM

## 2020-09-08 DIAGNOSIS — R39.15 URGENCY OF URINATION: ICD-10-CM

## 2020-09-08 LAB
ALBUMIN UR-MCNC: 30 MG/DL
APPEARANCE UR: CLEAR
BACTERIA #/AREA URNS HPF: ABNORMAL /HPF
BILIRUB UR QL STRIP: NEGATIVE
COLOR UR AUTO: YELLOW
ERYTHROCYTE [DISTWIDTH] IN BLOOD BY AUTOMATED COUNT: 13 % (ref 10–15)
GLUCOSE 1H P 50 G GLC PO SERPL-MCNC: 125 MG/DL (ref 60–129)
GLUCOSE UR STRIP-MCNC: NEGATIVE MG/DL
HCT VFR BLD AUTO: 37.2 % (ref 35–47)
HGB BLD-MCNC: 12.5 G/DL (ref 11.7–15.7)
HGB UR QL STRIP: NEGATIVE
HYALINE CASTS #/AREA URNS LPF: 2 /LPF (ref 0–2)
KETONES UR STRIP-MCNC: 5 MG/DL
LEUKOCYTE ESTERASE UR QL STRIP: ABNORMAL
MCH RBC QN AUTO: 29.8 PG (ref 26.5–33)
MCHC RBC AUTO-ENTMCNC: 33.6 G/DL (ref 31.5–36.5)
MCV RBC AUTO: 89 FL (ref 78–100)
MUCOUS THREADS #/AREA URNS LPF: PRESENT /LPF
NITRATE UR QL: NEGATIVE
PH UR STRIP: 5.5 PH (ref 5–7)
PLATELET # BLD AUTO: 290 10E9/L (ref 150–450)
RBC # BLD AUTO: 4.2 10E12/L (ref 3.8–5.2)
RBC #/AREA URNS AUTO: 2 /HPF (ref 0–2)
SOURCE: ABNORMAL
SP GR UR STRIP: 1.03 (ref 1–1.03)
SQUAMOUS #/AREA URNS AUTO: 6 /HPF (ref 0–1)
UROBILINOGEN UR STRIP-MCNC: NORMAL MG/DL (ref 0–2)
WBC # BLD AUTO: 11 10E9/L (ref 4–11)
WBC #/AREA URNS AUTO: 7 /HPF (ref 0–5)

## 2020-09-08 PROCEDURE — 81001 URINALYSIS AUTO W/SCOPE: CPT | Mod: ZL | Performed by: OBSTETRICS & GYNECOLOGY

## 2020-09-08 PROCEDURE — 87086 URINE CULTURE/COLONY COUNT: CPT | Mod: ZL | Performed by: OBSTETRICS & GYNECOLOGY

## 2020-09-08 PROCEDURE — 85027 COMPLETE CBC AUTOMATED: CPT | Mod: ZL | Performed by: OBSTETRICS & GYNECOLOGY

## 2020-09-08 PROCEDURE — 86780 TREPONEMA PALLIDUM: CPT | Mod: ZL | Performed by: OBSTETRICS & GYNECOLOGY

## 2020-09-08 PROCEDURE — 82950 GLUCOSE TEST: CPT | Mod: ZL | Performed by: OBSTETRICS & GYNECOLOGY

## 2020-09-08 PROCEDURE — 36415 COLL VENOUS BLD VENIPUNCTURE: CPT | Mod: ZL | Performed by: OBSTETRICS & GYNECOLOGY

## 2020-09-08 PROCEDURE — 99207 ZZC OB VISIT-NO CHARGE - GICH ONLY: CPT | Performed by: OBSTETRICS & GYNECOLOGY

## 2020-09-08 ASSESSMENT — PAIN SCALES - GENERAL: PAINLEVEL: NO PAIN (0)

## 2020-09-08 NOTE — PROGRESS NOTES
Return OB Visit    S: Patient is feeling well. Having some discomfort with urination, urgency. No ctx, VB or LOF. +FM. Traveling to [place] next week    O: /66 (BP Location: Right arm, Patient Position: Sitting, Cuff Size: Adult Large)   Pulse 72   Wt 65.3 kg (144 lb)   LMP 2020 (Exact Date)   Breastfeeding No   BMI 28.12 kg/m    Gen: Well-appearing, NAD  See OB Flowsheet    A/P:  Nato Bradley is a 35 year old  at 26w0d by IVF dating, here for return OB visit.  Gestational carrier: egg donor AMA, plans delivery in [place] for Level 3 NICU.   History of  section: plans . Desires consult in [place] around 32 weeks to possibly schedule induction, discuss legal concerns, surrogate parents at delivery, etc- referral placed  Urinary urgency: UA appears contaminated, will await culture     PNC: labs received- all normal,  O positive, antibody negative, rubella immune, HIV NR, HepB NR, RPR NR, HCV negative, GC/Chlam negative  Genetics: [place] normal   Imaging: dating US at 7w0d, Level 2 US normal except limited heart views. Normal fetal echo  Immunizations: none  RTC 4 weeks    Peri Henry MD  OB/GYN  2020 3:29 PM

## 2020-09-08 NOTE — NURSING NOTE
Chief Complaint   Patient presents with     Prenatal Care     26w0d     Offers no complaints   Belkis Manzo LPN........................9/8/2020  3:27 PM     Medication Reconciliation: completed   Belkis Manzo LPN  9/8/2020 3:26 PM

## 2020-09-10 LAB — T PALLIDUM AB SER QL: NONREACTIVE

## 2020-09-11 LAB
BACTERIA SPEC CULT: NO GROWTH
SPECIMEN SOURCE: NORMAL

## 2020-10-09 ENCOUNTER — PRENATAL OFFICE VISIT (OUTPATIENT)
Dept: OBGYN | Facility: OTHER | Age: 35
End: 2020-10-09
Attending: OBSTETRICS & GYNECOLOGY
Payer: COMMERCIAL

## 2020-10-09 VITALS
SYSTOLIC BLOOD PRESSURE: 122 MMHG | HEART RATE: 72 BPM | BODY MASS INDEX: 28.51 KG/M2 | WEIGHT: 146 LBS | DIASTOLIC BLOOD PRESSURE: 60 MMHG

## 2020-10-09 DIAGNOSIS — O09.813 PREGNANCY RESULTING FROM IN VITRO FERTILIZATION IN THIRD TRIMESTER: Primary | ICD-10-CM

## 2020-10-09 PROCEDURE — 99207 PR OB VISIT-NO CHARGE - GICH ONLY: CPT | Performed by: OBSTETRICS & GYNECOLOGY

## 2020-10-09 ASSESSMENT — PAIN SCALES - GENERAL: PAINLEVEL: NO PAIN (0)

## 2020-10-09 NOTE — PROGRESS NOTES
Return OB Visit    S: Patient is feeling well. Some BH ctx, no VB or LOF. +FM    O: /60 (BP Location: Right arm, Patient Position: Sitting, Cuff Size: Adult Large)   Pulse 72   Wt 66.2 kg (146 lb)   LMP 2020 (Exact Date)   Breastfeeding No   BMI 28.51 kg/m    Gen: Well-appearing, NAD  See OB Flowsheet    A/P:  Nato Bradley is a 35 year old  at 30w3d by IVF dating, here for return OB visit.  Gestational carrier: egg donor AMA, plans delivery in [place] for Level 3 NICU.   History of  section: plans . Has consult next week in [place] to discuss possibly schedule induction, discuss legal concerns, surrogate parents at delivery, etc     PNC: labs received- all normal,  O positive, antibody negative, rubella immune, HIV NR, HepB NR, RPR NR, HCV negative, GC/Chlam negative  Genetics: [place] normal   Imaging: dating US at 7w0d, Level 2 US normal except limited heart views. Normal fetal echo  Immunizations: discussed today, surrogate parents want to think about it for another week  RTC 2 weeks    Peri Henry MD  OB/GYN  10/9/2020 11:00 AM

## 2020-10-09 NOTE — NURSING NOTE
Chief Complaint   Patient presents with     Prenatal Care     30w3d     Offers no complaints   Belkis Manzo LPN........................10/9/2020  10:59 AM     Medication Reconciliation: completed   Belkis Manzo LPN  10/9/2020 10:59 AM

## 2020-10-23 ENCOUNTER — NURSE TRIAGE (OUTPATIENT)
Dept: OBGYN | Facility: OTHER | Age: 35
End: 2020-10-23

## 2020-10-23 NOTE — TELEPHONE ENCOUNTER
"S-(situation): Pregnant with 5th child and having contractions.    B-(background): GA: 32w3d, IVF, Surrogate for couple in [place]. [facility] Cancelled today's visit as she was seen in [place] on 10/9 and 10/16.    A-(assessment): C/o \"Arash sosa\"-like contractions. Persisting since Wednesday, even through the night. Yesterday: 5+ minutes apart (especially if up and moving around). \"Laying low\" past 1.5 days. Today: fairly constant, 10+ minutes apart. No pain, \"only annoying.\" Denies: decrease in fetal movement, leaking of fluid from vagina, fever, hand/facial swelling, feeling faint, bleeding, urge to push, headache, vision changes    R-(recommendations): Protocol recommends Pt go to ED ([facility]) for contractions >10 minutes apart that persist >24 hours, and no improvement with increased water intake, laying down etc. Care advice given per protocol.     Per telephone conversation with Dr. Angeles Henry, she states it seems symptoms are improving, however, if contractions become more uncomfortable, increase in frequency or if she develops any of the signs of pre-term labor noted above, she needs to go directly to [facility] for monitoring.     Called and spoke to Patient after verifying last name and date of birth. Pt notified of provider's recommendations. The patient indicates understanding of these issues and agrees with the plan.     Flori Traylor RN .............. 10/23/2020  12:56 PM     Reason for Disposition    Contractions > 10 minutes apart that persist > 24 hours, and no improvement using CARE ADVICE    Additional Information    Negative: Passed out (i.e., fainted, collapsed and was not responding)    Negative: Shock suspected (e.g., cold/pale/clammy skin, too weak to stand, low BP, rapid pulse)    Negative: Difficult to awaken or acting confused (e.g., disoriented, slurred speech)    Negative: SEVERE constant abdominal pain (e.g., excruciating) and present > 1 hour    Negative: SEVERE " "bleeding (e.g., continuous red blood from vagina, or large blood clots)    Negative: Umbilical cord hanging out of the vagina (shiny, white, curled appearance, \"like telephone cord\")    Negative: Uncontrollable urge to push (i.e., feels like baby is coming out now)    Negative: Can see baby    Negative: Sounds like a life-threatening emergency to the triager    Negative: Pregnant > 36 weeks (i.e., term)    Negative: MODERATE-SEVERE abdominal pain    Negative: Contractions and any vaginal bleeding (including: red blood, clots, spotting, or pink/brown mucous)    Negative: Vaginal bleeding or spotting    Negative: Leakage of fluid from vagina    Negative: Baby moving less today (e.g., kick count < 5 in 1 hour or < 10 in 2 hours) and 23 or more weeks pregnant    Negative: No movement of baby for 8 hours    Negative: Severe headache or headache that won't go away    Negative: Contractions < 10 minutes apart for 1 hour (i.e., 6 or more contractions an hour)     Not today.    Negative: New blurred vision or vision changes    Negative: Pinkish or brownish mucous discharge    Negative: Patient sounds very sick or weak to the triager    Negative: Fever > 100.4 F (38.0 C)    Protocols used: PREGNANCY - LABOR - [id number]    Flori Traylor RN .............. 10/23/2020  12:56 PM    "

## 2020-10-26 ENCOUNTER — PRENATAL OFFICE VISIT (OUTPATIENT)
Dept: OBGYN | Facility: OTHER | Age: 35
End: 2020-10-26
Attending: OBSTETRICS & GYNECOLOGY
Payer: COMMERCIAL

## 2020-10-26 VITALS
SYSTOLIC BLOOD PRESSURE: 110 MMHG | DIASTOLIC BLOOD PRESSURE: 72 MMHG | HEART RATE: 76 BPM | WEIGHT: 145.4 LBS | BODY MASS INDEX: 28.4 KG/M2

## 2020-10-26 DIAGNOSIS — R30.0 DYSURIA: Primary | ICD-10-CM

## 2020-10-26 DIAGNOSIS — O47.03 PRETERM UTERINE CONTRACTIONS, ANTEPARTUM, THIRD TRIMESTER: ICD-10-CM

## 2020-10-26 LAB
ALBUMIN UR-MCNC: 10 MG/DL
APPEARANCE UR: CLEAR
BACTERIA #/AREA URNS HPF: ABNORMAL /HPF
BILIRUB UR QL STRIP: NEGATIVE
COLOR UR AUTO: YELLOW
FIBRONECTIN FETAL VAG QL: NEGATIVE
GLUCOSE UR STRIP-MCNC: NEGATIVE MG/DL
HGB UR QL STRIP: NEGATIVE
KETONES UR STRIP-MCNC: NEGATIVE MG/DL
LEUKOCYTE ESTERASE UR QL STRIP: ABNORMAL
MUCOUS THREADS #/AREA URNS LPF: PRESENT /LPF
NITRATE UR QL: NEGATIVE
PH UR STRIP: 6.5 PH (ref 5–7)
RBC #/AREA URNS AUTO: 1 /HPF (ref 0–2)
SOURCE: ABNORMAL
SP GR UR STRIP: 1.02 (ref 1–1.03)
SQUAMOUS #/AREA URNS AUTO: 6 /HPF (ref 0–1)
UROBILINOGEN UR STRIP-MCNC: NORMAL MG/DL (ref 0–2)
WBC #/AREA URNS AUTO: 3 /HPF (ref 0–5)

## 2020-10-26 PROCEDURE — 81001 URINALYSIS AUTO W/SCOPE: CPT | Mod: ZL | Performed by: OBSTETRICS & GYNECOLOGY

## 2020-10-26 PROCEDURE — 99207 PR OB VISIT-NO CHARGE - GICH ONLY: CPT | Performed by: OBSTETRICS & GYNECOLOGY

## 2020-10-26 PROCEDURE — 82731 ASSAY OF FETAL FIBRONECTIN: CPT | Mod: ZL | Performed by: OBSTETRICS & GYNECOLOGY

## 2020-10-26 ASSESSMENT — PAIN SCALES - GENERAL: PAINLEVEL: NO PAIN (0)

## 2020-10-26 NOTE — PROGRESS NOTES
CC: Recheck OB visit at 32w6d    HPI: Nato Bradley presents for a routine OB visit now at 32w6d  She has some BH contractions, diarrhea, and new low back pain.  Active baby.    OB History    Para Term  AB Living   6 4 4 0 1 4   SAB TAB Ectopic Multiple Live Births   0 1 0 0 4      # Outcome Date GA Lbr Jovi/2nd Weight Sex Delivery Anes PTL Lv   6 Current            5 Term 10/28/11    M Vag-Spont EPI N MT      Name: Jim   4 Term 03    M Vag-Spont EPI Y MT      Name: sebastian jeronimo   3 TAB            2 Term     F  EPI N MT   1 Term     M -SEC EPI N MT      Complications: Breech presentation     Current Outpatient Medications   Medication     Prenatal Vit-Fe Fumarate-FA (PNV PRENATAL PLUS MULTIVITAMIN) 27-1 MG TABS per tablet     No current facility-administered medications for this visit.          O: /72 (BP Location: Right arm, Patient Position: Sitting, Cuff Size: Adult Regular)   Pulse 76   Wt 66 kg (145 lb 6.4 oz)   LMP 2020 (Exact Date)   BMI 28.40 kg/m    Body mass index is 28.4 kg/m .  See OB flow sheet  EXAM:  NAD  Cx LTC  FFN collected    No results found for any visits on 10/26/20.    A/P: 32w6d gestation  (R30.0) Dysuria  (primary encounter diagnosis)  Comment:   Plan: UA reflex to Microscopic and Culture            (O47.03)  uterine contractions, antepartum, third trimester  Comment:   Plan: UA reflex to Microscopic and Culture, Fetal         fibronectin          Results for orders placed or performed in visit on 10/26/20   UA reflex to Microscopic and Culture     Status: Abnormal    Specimen: Midstream Urine   Result Value Ref Range    Color Urine Yellow     Appearance Urine Clear     Glucose Urine Negative NEG^Negative mg/dL    Bilirubin Urine Negative NEG^Negative    Ketones Urine Negative NEG^Negative mg/dL    Specific Gravity Urine 1.018 1.003 - 1.035    Blood Urine Negative NEG^Negative    pH Urine 6.5 5.0 - 7.0 pH    Protein Albumin  Urine 10 (A) NEG^Negative mg/dL    Urobilinogen mg/dL Normal 0.0 - 2.0 mg/dL    Nitrite Urine Negative NEG^Negative    Leukocyte Esterase Urine Small (A) NEG^Negative    Source Midstream Urine     RBC Urine 1 0 - 2 /HPF    WBC Urine 3 0 - 5 /HPF    Bacteria Urine Few (A) NEG^Negative /HPF    Squamous Epithelial /HPF Urine 6 (H) 0 - 1 /HPF    Mucous Urine Present (A) NEG^Negative /LPF           Recheck in 1-2 weeks    Maycol Hernandez MD FACOG  10:03 AM 10/26/2020

## 2020-10-26 NOTE — NURSING NOTE
Patient here for prenatal care. She states she has ha a lot of contractions over the weekend, to the point where they wake her up at night. She states this all started on Thursday. She states she she been having diarrhea over the last three days and then she states she felt baby drop. Pt states she has been having slight headaches as well. Pt states she has had no vaginal bleeding or any leaking of fluid.   Medication Reconciliation: complete    Uvaldo Al LPN  10/26/2020 9:55 AM

## 2020-11-06 ENCOUNTER — PRENATAL OFFICE VISIT (OUTPATIENT)
Dept: OBGYN | Facility: OTHER | Age: 35
End: 2020-11-06
Attending: OBSTETRICS & GYNECOLOGY
Payer: COMMERCIAL

## 2020-11-06 VITALS
DIASTOLIC BLOOD PRESSURE: 70 MMHG | WEIGHT: 147.3 LBS | HEART RATE: 80 BPM | BODY MASS INDEX: 28.77 KG/M2 | SYSTOLIC BLOOD PRESSURE: 120 MMHG

## 2020-11-06 DIAGNOSIS — O09.813 PREGNANCY RESULTING FROM IN VITRO FERTILIZATION IN THIRD TRIMESTER: Primary | ICD-10-CM

## 2020-11-06 PROCEDURE — 99207 PR OB VISIT-NO CHARGE - GICH ONLY: CPT | Performed by: OBSTETRICS & GYNECOLOGY

## 2020-11-06 ASSESSMENT — PAIN SCALES - GENERAL: PAINLEVEL: NO PAIN (0)

## 2020-11-06 NOTE — NURSING NOTE
Patient here for prenatal care, denies questions or concerns.     Medication Reconciliation: complete    Uvaldo Al LPN  11/6/2020 9:04 AM

## 2020-11-06 NOTE — PROGRESS NOTES
Return OB Visit    S: Patient is feeling well. Ctx basically stopped with decreased activity. No VB or LOF. +FM    O: /70 (BP Location: Right arm, Patient Position: Sitting, Cuff Size: Adult Regular)   Pulse 80   Wt 66.8 kg (147 lb 4.8 oz)   LMP 2020 (Exact Date)   BMI 28.77 kg/m    Gen: Well-appearing, NAD  See OB Flowsheet    A/P:  Nato Bradley is a 35 year old  at 34w3d by IVF dating, here for return OB visit.  Gestational carrier: egg donor AMA, plans delivery in [place] for Level 3 NICU.   History of  section: plans . 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 Plan acyclovir at 36 weeks     PNC: labs received- all normal,  O positive, antibody negative, rubella immune, HIV NR, HepB NR, RPR NR, HCV negative, GC/Chlam negative  Genetics: [place] normal   Imaging: dating US at 7w0d, Level 2 US normal except limited heart views. Normal fetal echo  Immunizations: s/p Tdap, flu in [place]  RTC 2 weeks- GBS, start acyclovir next visit    Peri Henry MD  OB/GYN  2020 9:06 AM

## 2020-11-20 ENCOUNTER — PRENATAL OFFICE VISIT (OUTPATIENT)
Dept: OBGYN | Facility: OTHER | Age: 35
End: 2020-11-20
Attending: OBSTETRICS & GYNECOLOGY
Payer: COMMERCIAL

## 2020-11-20 VITALS
HEART RATE: 72 BPM | SYSTOLIC BLOOD PRESSURE: 118 MMHG | WEIGHT: 150 LBS | DIASTOLIC BLOOD PRESSURE: 72 MMHG | BODY MASS INDEX: 29.29 KG/M2

## 2020-11-20 DIAGNOSIS — O09.813 PREGNANCY RESULTING FROM IN VITRO FERTILIZATION IN THIRD TRIMESTER: Primary | ICD-10-CM

## 2020-11-20 DIAGNOSIS — O98.319 GENITAL HERPES SIMPLEX VIRUS (HSV) INFECTION IN MOTHER AFFECTING PREGNANCY: ICD-10-CM

## 2020-11-20 DIAGNOSIS — A60.09 GENITAL HERPES SIMPLEX VIRUS (HSV) INFECTION IN MOTHER AFFECTING PREGNANCY: ICD-10-CM

## 2020-11-20 PROCEDURE — 99207 PR OB VISIT-NO CHARGE - GICH ONLY: CPT | Performed by: OBSTETRICS & GYNECOLOGY

## 2020-11-20 PROCEDURE — 87081 CULTURE SCREEN ONLY: CPT | Mod: ZL | Performed by: OBSTETRICS & GYNECOLOGY

## 2020-11-20 RX ORDER — ACYCLOVIR 400 MG/1
400 TABLET ORAL EVERY 12 HOURS
Qty: 60 TABLET | Refills: 1 | Status: ON HOLD | OUTPATIENT
Start: 2020-11-20 | End: 2020-12-03

## 2020-11-20 ASSESSMENT — PAIN SCALES - GENERAL: PAINLEVEL: NO PAIN (0)

## 2020-11-20 NOTE — PROGRESS NOTES
Return OB Visit    S: Patient is feeling well. Contractions mostly with activity. Getting nervous about delivery in [place] due to COVID situation but ready to be done. No VB or LOF. +FM. Feels like baby has dropped    O: /72 (BP Location: Right arm, Patient Position: Sitting, Cuff Size: Adult Large)   Pulse 72   Wt 68 kg (150 lb)   LMP 2020 (Exact Date)   Breastfeeding No   BMI 29.29 kg/m    Gen: Well-appearing, NAD  See OB Flowsheet    A/P:  Nato Bradley is a 35 year old  at 36w3d by IVF dating, here for return OB visit.  Gestational carrier: egg donor AMA, plans delivery in [place] for Level 3 NICU. discussed option for  at [facility] and what services would be offered if COVID restrictions limit birth attendance for the biological parents. Approval given from incident command to have patient's  and both biological parents in the room for delivery.  History of  section: plans . s/p consult in [place], has IOL scheduled for  evening if no spontaneous labor by then   has vasectomy  Hx of genital HSV: no outbreak in years. Plan acyclovir at 36 weeks     PNC: labs received- all normal,  O positive, antibody negative, rubella immune, HIV NR, HepB NR, RPR NR, HCV negative, GC/Chlam negative  Genetics: [place] normal   Imaging: dating US at 7w0d, Level 2 US normal except limited heart views. Normal fetal echo  Immunizations: s/p Tdap, flu in [place]  RTC weekly    Peri Henry MD  OB/GYN  2020 8:59 AM

## 2020-11-20 NOTE — NURSING NOTE
Chief Complaint   Patient presents with     Prenatal Care     36w3d     Offers no complaints   Belkis Manzo LPN........................11/20/2020  8:48 AM       Medication Reconciliation: completed   Belkis Manzo LPN  11/20/2020 8:48 AM

## 2020-11-22 LAB
BACTERIA SPEC CULT: NORMAL
SPECIMEN SOURCE: NORMAL

## 2020-11-23 ENCOUNTER — MYC MEDICAL ADVICE (OUTPATIENT)
Dept: OBGYN | Facility: OTHER | Age: 35
End: 2020-11-23

## 2020-11-25 ENCOUNTER — PRENATAL OFFICE VISIT (OUTPATIENT)
Dept: OBGYN | Facility: OTHER | Age: 35
End: 2020-11-25
Attending: OBSTETRICS & GYNECOLOGY
Payer: COMMERCIAL

## 2020-11-25 ENCOUNTER — MYC MEDICAL ADVICE (OUTPATIENT)
Dept: OBGYN | Facility: OTHER | Age: 35
End: 2020-11-25

## 2020-11-25 VITALS
BODY MASS INDEX: 29.29 KG/M2 | HEART RATE: 80 BPM | SYSTOLIC BLOOD PRESSURE: 108 MMHG | DIASTOLIC BLOOD PRESSURE: 60 MMHG | WEIGHT: 150 LBS

## 2020-11-25 DIAGNOSIS — O09.813 PREGNANCY RESULTING FROM IN VITRO FERTILIZATION IN THIRD TRIMESTER: Primary | ICD-10-CM

## 2020-11-25 DIAGNOSIS — Z3A.37 37 WEEKS GESTATION OF PREGNANCY: Primary | ICD-10-CM

## 2020-11-25 PROCEDURE — 99207 PR OB VISIT-NO CHARGE - GICH ONLY: CPT | Performed by: OBSTETRICS & GYNECOLOGY

## 2020-11-25 RX ORDER — BREAST PUMP
EACH MISCELLANEOUS
Qty: 1 EACH | Refills: 0 | Status: SHIPPED | OUTPATIENT
Start: 2020-11-25 | End: 2021-01-13

## 2020-11-25 ASSESSMENT — PAIN SCALES - GENERAL: PAINLEVEL: NO PAIN (0)

## 2020-11-25 NOTE — NURSING NOTE
Chief Complaint   Patient presents with     Prenatal Care     37w1d     Offers no complaints states she is having increase contractions just not regular   Belkis Manzo LPN........................11/25/2020  8:52 AM       Medication Reconciliation: completed   Belkis Manzo LPN  11/25/2020 8:51 AM

## 2020-11-25 NOTE — PROGRESS NOTES
Return OB Visit    S: Patient is feeling well but uncomfortable. Ctx with activity. No VB or LOF. +FM    O: /60 (BP Location: Right arm, Patient Position: Sitting, Cuff Size: Adult Large)   Pulse 80   Wt 68 kg (150 lb)   LMP 2020 (Exact Date)   Breastfeeding No   BMI 29.29 kg/m    Gen: Well-appearing, NAD  See OB Flowsheet    A/P:  Nato Bradley is a 35 year old  at 37w1d by IVF dating, here for return OB visit.  Gestational carrier: egg donor AMA, plans delivery in [place] for Level 3 NICU. discussed option for  at [facility] and what services would be offered if COVID restrictions limit birth attendance for the biological parents. Approval given from incident command to have patient's  and both biological parents in the room for delivery.  History of  section: plans . s/p consult in [place], has IOL scheduled for 12/8 evening if no spontaneous labor by then   has vasectomy  Hx of genital HSV: no outbreak in years. Plan acyclovir at 36 weeks     PNC: labs received- all normal,  O positive, antibody negative, rubella immune, HIV NR, HepB NR, RPR NR, HCV negative, GC/Chlam negative  Genetics: [place] normal   Imaging: dating US at 7w0d, Level 2 US normal except limited heart views. Normal fetal echo  Immunizations: s/p Tdap, flu in [place]  RTC weekly    Peri Henry MD  OB/GYN  2020 9:27 AM

## 2020-12-01 ENCOUNTER — PRENATAL OFFICE VISIT (OUTPATIENT)
Dept: OBGYN | Facility: OTHER | Age: 35
End: 2020-12-01
Attending: OBSTETRICS & GYNECOLOGY
Payer: COMMERCIAL

## 2020-12-01 VITALS
SYSTOLIC BLOOD PRESSURE: 110 MMHG | HEART RATE: 72 BPM | DIASTOLIC BLOOD PRESSURE: 70 MMHG | WEIGHT: 152 LBS | BODY MASS INDEX: 29.69 KG/M2

## 2020-12-01 DIAGNOSIS — O09.813 PREGNANCY RESULTING FROM IN VITRO FERTILIZATION IN THIRD TRIMESTER: Primary | ICD-10-CM

## 2020-12-01 DIAGNOSIS — Z01.812 PRE-PROCEDURE LAB EXAM: ICD-10-CM

## 2020-12-01 PROCEDURE — C9803 HOPD COVID-19 SPEC COLLECT: HCPCS

## 2020-12-01 PROCEDURE — 99207 PR OB VISIT-NO CHARGE - GICH ONLY: CPT | Performed by: OBSTETRICS & GYNECOLOGY

## 2020-12-01 PROCEDURE — U0003 INFECTIOUS AGENT DETECTION BY NUCLEIC ACID (DNA OR RNA); SEVERE ACUTE RESPIRATORY SYNDROME CORONAVIRUS 2 (SARS-COV-2) (CORONAVIRUS DISEASE [COVID-19]), AMPLIFIED PROBE TECHNIQUE, MAKING USE OF HIGH THROUGHPUT TECHNOLOGIES AS DESCRIBED BY CMS-2020-01-R: HCPCS | Mod: ZL | Performed by: OBSTETRICS & GYNECOLOGY

## 2020-12-01 ASSESSMENT — PAIN SCALES - GENERAL: PAINLEVEL: MODERATE PAIN (5)

## 2020-12-01 NOTE — NURSING NOTE
Chief Complaint   Patient presents with     Prenatal Care     38w0d     States has had contractions regularly but have now subsided.   Belkis Manzo LPN........................12/1/2020  10:10 AM     Medication Reconciliation: completed   Belkis Manzo LPN  12/1/2020 10:09 AM

## 2020-12-02 ENCOUNTER — ANESTHESIA (OUTPATIENT)
Dept: OBGYN | Facility: OTHER | Age: 35
End: 2020-12-02
Payer: COMMERCIAL

## 2020-12-02 ENCOUNTER — HOSPITAL ENCOUNTER (INPATIENT)
Facility: OTHER | Age: 35
LOS: 1 days | Discharge: HOME | End: 2020-12-03
Attending: OBSTETRICS & GYNECOLOGY | Admitting: OBSTETRICS & GYNECOLOGY
Payer: COMMERCIAL

## 2020-12-02 ENCOUNTER — ANESTHESIA EVENT (OUTPATIENT)
Dept: OBGYN | Facility: OTHER | Age: 35
End: 2020-12-02
Payer: COMMERCIAL

## 2020-12-02 PROBLEM — Z36.89 ENCOUNTER FOR TRIAGE IN PREGNANT PATIENT: Status: ACTIVE | Noted: 2020-12-02

## 2020-12-02 PROBLEM — Z37.9 NORMAL LABOR: Status: ACTIVE | Noted: 2020-12-02

## 2020-12-02 LAB
ABO + RH BLD: NORMAL
ABO + RH BLD: NORMAL
BASOPHILS # BLD AUTO: 0 10E9/L (ref 0–0.2)
BASOPHILS NFR BLD AUTO: 0.2 %
BLD GP AB SCN SERPL QL: NORMAL
BLOOD BANK CMNT PATIENT-IMP: NORMAL
DIFFERENTIAL METHOD BLD: ABNORMAL
EOSINOPHIL # BLD AUTO: 0.1 10E9/L (ref 0–0.7)
EOSINOPHIL NFR BLD AUTO: 0.7 %
ERYTHROCYTE [DISTWIDTH] IN BLOOD BY AUTOMATED COUNT: 13.1 % (ref 10–15)
HCT VFR BLD AUTO: 34.8 % (ref 35–47)
HGB BLD-MCNC: 11.4 G/DL (ref 11.7–15.7)
IMM GRANULOCYTES # BLD: 0.1 10E9/L (ref 0–0.4)
IMM GRANULOCYTES NFR BLD: 0.4 %
LABORATORY COMMENT REPORT: NORMAL
LYMPHOCYTES # BLD AUTO: 2 10E9/L (ref 0.8–5.3)
LYMPHOCYTES NFR BLD AUTO: 14.4 %
MCH RBC QN AUTO: 27.9 PG (ref 26.5–33)
MCHC RBC AUTO-ENTMCNC: 32.8 G/DL (ref 31.5–36.5)
MCV RBC AUTO: 85 FL (ref 78–100)
MONOCYTES # BLD AUTO: 0.7 10E9/L (ref 0–1.3)
MONOCYTES NFR BLD AUTO: 4.8 %
NEUTROPHILS # BLD AUTO: 11.1 10E9/L (ref 1.6–8.3)
NEUTROPHILS NFR BLD AUTO: 79.5 %
PLATELET # BLD AUTO: 260 10E9/L (ref 150–450)
RBC # BLD AUTO: 4.08 10E12/L (ref 3.8–5.2)
SARS-COV-2 RNA SPEC QL NAA+PROBE: NEGATIVE
SARS-COV-2 RNA SPEC QL NAA+PROBE: NORMAL
SARS-COV-2 RNA SPEC QL NAA+PROBE: NOT DETECTED
SPECIMEN EXP DATE BLD: NORMAL
SPECIMEN SOURCE: NORMAL
WBC # BLD AUTO: 14 10E9/L (ref 4–11)

## 2020-12-02 PROCEDURE — 86901 BLOOD TYPING SEROLOGIC RH(D): CPT | Performed by: OBSTETRICS & GYNECOLOGY

## 2020-12-02 PROCEDURE — 250N000009 HC RX 250: Performed by: NURSE ANESTHETIST, CERTIFIED REGISTERED

## 2020-12-02 PROCEDURE — 120N000001 HC R&B MED SURG/OB

## 2020-12-02 PROCEDURE — 250N000009 HC RX 250: Performed by: OBSTETRICS & GYNECOLOGY

## 2020-12-02 PROCEDURE — G0463 HOSPITAL OUTPT CLINIC VISIT: HCPCS | Mod: 25

## 2020-12-02 PROCEDURE — 87635 SARS-COV-2 COVID-19 AMP PRB: CPT | Performed by: OBSTETRICS & GYNECOLOGY

## 2020-12-02 PROCEDURE — 250N000013 HC RX MED GY IP 250 OP 250 PS 637: Performed by: OBSTETRICS & GYNECOLOGY

## 2020-12-02 PROCEDURE — 85025 COMPLETE CBC W/AUTO DIFF WBC: CPT | Performed by: OBSTETRICS & GYNECOLOGY

## 2020-12-02 PROCEDURE — 258N000003 HC RX IP 258 OP 636: Performed by: OBSTETRICS & GYNECOLOGY

## 2020-12-02 PROCEDURE — 86850 RBC ANTIBODY SCREEN: CPT | Performed by: OBSTETRICS & GYNECOLOGY

## 2020-12-02 PROCEDURE — 250N000011 HC RX IP 250 OP 636: Performed by: OBSTETRICS & GYNECOLOGY

## 2020-12-02 PROCEDURE — 86900 BLOOD TYPING SEROLOGIC ABO: CPT | Performed by: OBSTETRICS & GYNECOLOGY

## 2020-12-02 PROCEDURE — 370N000003 HC ANESTHESIA WARD SERVICE

## 2020-12-02 PROCEDURE — 10907ZC DRAINAGE OF AMNIOTIC FLUID, THERAPEUTIC FROM PRODUCTS OF CONCEPTION, VIA NATURAL OR ARTIFICIAL OPENING: ICD-10-PCS | Performed by: OBSTETRICS & GYNECOLOGY

## 2020-12-02 PROCEDURE — 59400 OBSTETRICAL CARE: CPT | Performed by: NURSE ANESTHETIST, CERTIFIED REGISTERED

## 2020-12-02 PROCEDURE — 59400 OBSTETRICAL CARE: CPT | Performed by: OBSTETRICS & GYNECOLOGY

## 2020-12-02 PROCEDURE — 36415 COLL VENOUS BLD VENIPUNCTURE: CPT | Performed by: OBSTETRICS & GYNECOLOGY

## 2020-12-02 PROCEDURE — 86780 TREPONEMA PALLIDUM: CPT | Performed by: OBSTETRICS & GYNECOLOGY

## 2020-12-02 RX ORDER — NALOXONE HYDROCHLORIDE 0.4 MG/ML
0.4 INJECTION, SOLUTION INTRAMUSCULAR; INTRAVENOUS; SUBCUTANEOUS
Status: DISCONTINUED | OUTPATIENT
Start: 2020-12-02 | End: 2020-12-03

## 2020-12-02 RX ORDER — LIDOCAINE HYDROCHLORIDE AND EPINEPHRINE 15; 5 MG/ML; UG/ML
INJECTION, SOLUTION EPIDURAL PRN
Status: DISCONTINUED | OUTPATIENT
Start: 2020-12-02 | End: 2020-12-03

## 2020-12-02 RX ORDER — OXYCODONE AND ACETAMINOPHEN 5; 325 MG/1; MG/1
1 TABLET ORAL
Status: DISCONTINUED | OUTPATIENT
Start: 2020-12-02 | End: 2020-12-03

## 2020-12-02 RX ORDER — TERBUTALINE SULFATE 1 MG/ML
0.25 INJECTION, SOLUTION SUBCUTANEOUS
Status: DISCONTINUED | OUTPATIENT
Start: 2020-12-02 | End: 2020-12-03

## 2020-12-02 RX ORDER — CARBOPROST TROMETHAMINE 250 UG/ML
250 INJECTION, SOLUTION INTRAMUSCULAR
Status: DISCONTINUED | OUTPATIENT
Start: 2020-12-02 | End: 2020-12-03

## 2020-12-02 RX ORDER — METHYLERGONOVINE MALEATE 0.2 MG/ML
200 INJECTION INTRAVENOUS
Status: DISCONTINUED | OUTPATIENT
Start: 2020-12-02 | End: 2020-12-03

## 2020-12-02 RX ORDER — IBUPROFEN 400 MG/1
800 TABLET, FILM COATED ORAL
Status: COMPLETED | OUTPATIENT
Start: 2020-12-02 | End: 2020-12-02

## 2020-12-02 RX ORDER — NALOXONE HYDROCHLORIDE 0.4 MG/ML
0.2 INJECTION, SOLUTION INTRAMUSCULAR; INTRAVENOUS; SUBCUTANEOUS
Status: DISCONTINUED | OUTPATIENT
Start: 2020-12-02 | End: 2020-12-03

## 2020-12-02 RX ORDER — ACETAMINOPHEN 325 MG/1
650 TABLET ORAL EVERY 4 HOURS PRN
Status: DISCONTINUED | OUTPATIENT
Start: 2020-12-02 | End: 2020-12-03

## 2020-12-02 RX ORDER — OXYTOCIN 10 [USP'U]/ML
10 INJECTION, SOLUTION INTRAMUSCULAR; INTRAVENOUS
Status: DISCONTINUED | OUTPATIENT
Start: 2020-12-02 | End: 2020-12-03

## 2020-12-02 RX ORDER — LIDOCAINE 40 MG/G
CREAM TOPICAL
Status: DISCONTINUED | OUTPATIENT
Start: 2020-12-02 | End: 2020-12-03

## 2020-12-02 RX ORDER — FENTANYL/BUPIVACAINE/NS/PF 2-1250MCG
10 PLASTIC BAG, INJECTION (ML) INJECTION CONTINUOUS
Status: DISCONTINUED | OUTPATIENT
Start: 2020-12-02 | End: 2020-12-03

## 2020-12-02 RX ORDER — SODIUM CHLORIDE, SODIUM LACTATE, POTASSIUM CHLORIDE, CALCIUM CHLORIDE 600; 310; 30; 20 MG/100ML; MG/100ML; MG/100ML; MG/100ML
INJECTION, SOLUTION INTRAVENOUS CONTINUOUS
Status: DISCONTINUED | OUTPATIENT
Start: 2020-12-02 | End: 2020-12-03

## 2020-12-02 RX ORDER — LIDOCAINE HYDROCHLORIDE 10 MG/ML
INJECTION, SOLUTION INFILTRATION; PERINEURAL PRN
Status: DISCONTINUED | OUTPATIENT
Start: 2020-12-02 | End: 2020-12-03

## 2020-12-02 RX ORDER — ONDANSETRON 2 MG/ML
4 INJECTION INTRAMUSCULAR; INTRAVENOUS EVERY 6 HOURS PRN
Status: DISCONTINUED | OUTPATIENT
Start: 2020-12-02 | End: 2020-12-03

## 2020-12-02 RX ORDER — FENTANYL CITRATE-0.9 % NACL/PF 10 MCG/ML
100 PLASTIC BAG, INJECTION (ML) INTRAVENOUS EVERY 5 MIN PRN
Status: DISCONTINUED | OUTPATIENT
Start: 2020-12-02 | End: 2020-12-03

## 2020-12-02 RX ORDER — NALBUPHINE HYDROCHLORIDE 10 MG/ML
2.5-5 INJECTION, SOLUTION INTRAMUSCULAR; INTRAVENOUS; SUBCUTANEOUS EVERY 6 HOURS PRN
Status: DISCONTINUED | OUTPATIENT
Start: 2020-12-02 | End: 2020-12-03

## 2020-12-02 RX ADMIN — SODIUM CHLORIDE, POTASSIUM CHLORIDE, SODIUM LACTATE AND CALCIUM CHLORIDE 1000 ML: 600; 310; 30; 20 INJECTION, SOLUTION INTRAVENOUS at 09:40

## 2020-12-02 RX ADMIN — SODIUM CHLORIDE, POTASSIUM CHLORIDE, SODIUM LACTATE AND CALCIUM CHLORIDE: 600; 310; 30; 20 INJECTION, SOLUTION INTRAVENOUS at 18:43

## 2020-12-02 RX ADMIN — IBUPROFEN 800 MG: 400 TABLET ORAL at 22:29

## 2020-12-02 RX ADMIN — Medication 10 ML/HR: at 10:08

## 2020-12-02 RX ADMIN — SODIUM CHLORIDE, POTASSIUM CHLORIDE, SODIUM LACTATE AND CALCIUM CHLORIDE: 600; 310; 30; 20 INJECTION, SOLUTION INTRAVENOUS at 10:29

## 2020-12-02 RX ADMIN — Medication 100 ML/HR: at 21:15

## 2020-12-02 RX ADMIN — Medication 10 ML/HR: at 17:28

## 2020-12-02 RX ADMIN — LIDOCAINE HYDROCHLORIDE AND EPINEPHRINE 5 ML: 15; 5 INJECTION, SOLUTION EPIDURAL at 09:54

## 2020-12-02 RX ADMIN — Medication 4 ML: at 10:06

## 2020-12-02 RX ADMIN — SODIUM CHLORIDE, POTASSIUM CHLORIDE, SODIUM LACTATE AND CALCIUM CHLORIDE 1000 ML: 600; 310; 30; 20 INJECTION, SOLUTION INTRAVENOUS at 08:23

## 2020-12-02 RX ADMIN — Medication 2 MILLI-UNITS/MIN: at 17:58

## 2020-12-02 RX ADMIN — Medication 7 ML: at 17:29

## 2020-12-02 RX ADMIN — SODIUM CHLORIDE, POTASSIUM CHLORIDE, SODIUM LACTATE AND CALCIUM CHLORIDE: 600; 310; 30; 20 INJECTION, SOLUTION INTRAVENOUS at 22:30

## 2020-12-02 RX ADMIN — LIDOCAINE HYDROCHLORIDE 20 MG: 10 INJECTION, SOLUTION INFILTRATION; PERINEURAL at 09:50

## 2020-12-02 NOTE — ANESTHESIA PROCEDURE NOTES
Procedure note : epidural catheter      Staff -   CRNA: Reynaldo Tejeda APRN CRNA  Performed By: CRNA  Pre-Procedure    Location: OB    Procedure Times:12/2/2020 9:38 AM and 12/2/2020 10:13 AM  Pre-Anesthestic Checklist: patient identified, IV checked, site marked, risks and benefits discussed, informed consent, monitors and equipment checked, pre-op evaluation and at physician/surgeon's request    Timeout  Correct Patient: Yes   Correct Procedure: Yes   Correct Site: Yes     Correct Position: Yes     .   Procedure Documentation    Diagnosis:Labor Pain.    Procedure: epidural catheter, .   Patient Position:sitting Insertion Site:L3-4  (midline approach) Injection technique: LORT air   Local skin infiltrated with mL of 1% lidocaine.  BELLA at 4.5 cm    Patient Prep/Sterile Barriers; mask, sterile gloves, chlorhexidine gluconate and isopropyl alcohol, patient draped.  .  Needle: Touhy needle   Needle Gauge: 17.  # of attempts: 1 and # of redirects:  .    Catheter: 20 G . .  Catheter threaded easily  .  12 cm at skin.   .    Assessment/Narrative  Paresthesias: No.  .  .  Aspiration negative for heme or CSF  . Test dose of mL lidocaine 1.5% w/ 1:200,000 epinephrine at. Test dose negative for signs of intravascular, subdural or intrathecal injection.

## 2020-12-02 NOTE — PROGRESS NOTES
Category I tracing. Contractions 3-7 min apart.  Patient states the are stronger when she is standing.  Slight change from 3cm. Soft. Thick.posterior. Intact. Can now feel BOW. MD notified. Will continue to monitor as a rule out labor.

## 2020-12-02 NOTE — ANESTHESIA PREPROCEDURE EVALUATION
Anesthesia Pre-Procedure Evaluation    Patient: Nato Bradley   MRN: [id number] : 1985          Preoperative Diagnosis: * No pre-op diagnosis entered *    * No procedures listed *    Past Medical History:   Diagnosis Date     Personal history of other mental and behavioral disorders     No Comments Provided     Past Surgical History:   Procedure Laterality Date      SECTION      No Comments Provided     EXTRACTION(S) DENTAL      No Comments Provided       Anesthesia Evaluation     . Pt has had prior anesthetic. Type: Regional    No history of anesthetic complications          ROS/MED HX    ENT/Pulmonary:  - neg pulmonary ROS     Neurologic:  - neg neurologic ROS     Cardiovascular:  - neg cardiovascular ROS       METS/Exercise Tolerance:  >4 METS   Hematologic:  - neg hematologic  ROS       Musculoskeletal:  - neg musculoskeletal ROS       GI/Hepatic:  - neg GI/hepatic ROS       Renal/Genitourinary:  - ROS Renal section negative       Endo:  - neg endo ROS       Psychiatric:  - neg psychiatric ROS       Infectious Disease:  - neg infectious disease ROS       Malignancy:      - no malignancy   Other:    (+) Possibly pregnant                    neg OB ROS            Physical Exam  Normal systems: cardiovascular, pulmonary and dental    Airway   Mallampati: I  TM distance: >3 FB    Dental     Cardiovascular   Rhythm and rate: regular and normal      Pulmonary    breath sounds clear to auscultation            Lab Results   Component Value Date    WBC 14.0 (H) 2020    HGB 11.4 (L) 2020    HCT 34.8 (L) 2020     2020    SED 4 2015     04/15/2020    POTASSIUM 3.6 04/15/2020    CHLORIDE 107 04/15/2020    CO2 22 04/15/2020    BUN 10 04/15/2020    CR 0.75 04/15/2020    GLC 95 04/15/2020    RICHARD 8.8 04/15/2020    ALBUMIN 4.0 04/15/2020    PROTTOTAL 7.2 04/15/2020    ALT 9 04/15/2020    AST 13 04/15/2020    ALKPHOS 65 04/15/2020    BILITOTAL 0.2 (L) 04/15/2020    T4 0.66  02/21/2019    T3 119 02/21/2019    HCG Negative 12/19/2017       Preop Vitals  BP Readings from Last 3 Encounters:   12/02/20 128/82   12/01/20 110/70   11/25/20 108/60    Pulse Readings from Last 3 Encounters:   12/01/20 72   11/25/20 80   11/20/20 72      Resp Readings from Last 3 Encounters:   12/02/20 14   06/09/20 18   04/15/20 17    SpO2 Readings from Last 3 Encounters:   12/02/20 97%   06/09/20 99%   04/15/20 100%      Temp Readings from Last 1 Encounters:   12/02/20 97.1  F (36.2  C) (Temporal)    Ht Readings from Last 1 Encounters:   04/15/20 1.524 m (5')      Wt Readings from Last 1 Encounters:   12/01/20 68.9 kg (152 lb)    Estimated body mass index is 29.69 kg/m  as calculated from the following:    Height as of 4/15/20: 1.524 m (5').    Weight as of 12/1/20: 68.9 kg (152 lb).       Anesthesia Plan      History & Physical Review      ASA Status:  2 .  OB Epidural Asa: 2   NPO Status:  > 6 hours    Plan for Epidural            Postoperative Care      Consents  Anesthetic plan, risks, benefits and alternatives discussed with:  Patient..                 LEIF AVILA CRNA

## 2020-12-02 NOTE — PROGRESS NOTES
Intrapartum Progress Note    S: Patient is comfortable with epidural.     O: /55   Pulse 73   Temp 97.9  F (36.6  C)   Resp 18   LMP 2020 (Exact Date)   SpO2 98%    Gen: resting in bed, NAD  Cvx: 80/-1    FHT: 150, mod variability, + accels, no decels  [place]: 2 contractions in 10 min    Membranes: AROM- clear    A/P: 35 year old  at 38w1d by IVF dating admitted for TOLAC  Labor: spontaneous, OR notified of patient admission for TOLAC. Now s/p AROM, surrogate parents here.  Pain: epidural  FWB: Cat I, reactive. EFW 7 lbs  Rh positive, RI, GBS negative  Anticipate     Peri Henry MD 2:27 PM

## 2020-12-02 NOTE — PROGRESS NOTES
Intrapartum Progress Note    S: Patient is feeling pressure.     O: /81   Pulse 74   Temp 97.3  F (36.3  C)   Resp 18   LMP 2020 (Exact Date)   SpO2 97%   Breastfeeding Yes    Gen: resting in bed, NAD  Cvx: /-1    FHT: 150, mod variability, + accels, no decels  [place]: 1 contractions in 10 min    Membranes: s/p AROM, clear    A/P: 35 year old  at 38w1d by IVF dating admitted for TOLAC  Labor: spontaneous but contractions spaced- will start pitocin, OR notified of patient admission for TOLAC. Now s/p AROM, surrogate parents here.  Pain: epidural  FWB: Cat I, reactive. EFW 7 lbs  Rh positive, RI, GBS negative  Anticipate     Peri Henry MD 5:05 PM

## 2020-12-02 NOTE — PROGRESS NOTES
Intrapartum Progress Note    S: Patient is comfortable with epidural.     O: /66   Pulse 79   Temp 97.9  F (36.6  C)   Resp 18   LMP 2020 (Exact Date)   SpO2 99%    Gen: resting in bed, NAD  Cvx: 5/80/-1    FHT: 140, mod variability, + accels, no decels  [place]: 1-2 contractions in 10 min    Membranes: intact    A/P: 35 year old  at 38w1d by IVF dating admitted for TOLAC  Labor: spontaneous, OR notified of patient admission for TOLAC. Will AROM once surrogate parents arrive  Pain: epidural  FWB: Cat I, reactive. EFW 7 lbs  Rh positive, RI, GBS negative  Anticipate     Peri Henry MD 12:41 PM

## 2020-12-02 NOTE — PROGRESS NOTES
Return OB Visit    S: Having more ctx last night and today. More back pain. No VB or LOF. +FM    O: /70 (BP Location: Right arm, Patient Position: Sitting, Cuff Size: Adult Large)   Pulse 72   Wt 68.9 kg (152 lb)   LMP 2020 (Exact Date)   Breastfeeding No   BMI 29.69 kg/m    Gen: Well-appearing, NAD  See OB Flowsheet    A/P:  Nato Bradley is a 35 year old  at 38w1d by IVF dating, here for return OB visit.  Gestational carrier: egg donor AMA, plans delivery in [place] for Level 3 NICU. discussed option for  at [facility] and what services would be offered if COVID restrictions limit birth attendance for the biological parents. Approval given from incident command to have patient's  and both biological parents in the room for delivery.  History of  section: plans . s/p consult in [place], has IOL scheduled for 12/8 evening if no spontaneous labor by then   has vasectomy  Hx of genital HSV: no outbreak in years. Plan acyclovir at 36 weeks     PNC: labs received- all normal,  O positive, antibody negative, rubella immune, HIV NR, HepB NR, RPR NR, HCV negative, GC/Chlam negative  Genetics: [place] normal   Imaging: dating US at 7w0d, Level 2 US normal except limited heart views. Normal fetal echo  Immunizations: s/p Tdap, flu in [place]  Has IOL next week     Peri Henry MD  OB/GYN

## 2020-12-02 NOTE — PROGRESS NOTES
Patient arrived from home with  with reports of sharron at home since 2200 last night. States the contractions ar getting stronger and closer together. Is a surrogate mother to intended parents Tiffanie and Rigoberto Franchesca who live in [place]. , GBS negative. SVE at 0445: 3cm/40%/-2. Would like an epidural for pain control during labor.

## 2020-12-02 NOTE — PROGRESS NOTES
Patient is having perineal discomfort. KALLI Bacon here to evaluate labor discomfort. Patient rates pain at  3/10. FADIA Henry MD here to see patient. SVE done, cervix 5-6 cm with little change. ANNIE KAPOOR discussed pitocin augmentation with patient and  Boris. Patient and  Boris are in agreement to start pitocin augmentation.

## 2020-12-02 NOTE — PROGRESS NOTES
KALLI Bacon here to do epidural per patient request. Education completed. Questions answered. Time out completed.

## 2020-12-02 NOTE — H&P
[facility] Labor and Delivery History and Physical    Nato Bradley MRN# [id number]   Age: 35 year old YOB: 1985     Date of Admission:  2020    Primary care provider: No Ref-Primary, Physician           Chief Complaint:   Nato Bradley is a 35 year old  at 38w1d by IVF dating admitted for spontaneous labor, trial of labor after  section. Contractions overnight, intensified this morning. No VB or LOF. +FM. No outbreak symptoms.          Pregnancy history:     OBSTETRIC HISTORY:    OB History    Para Term  AB Living   6 4 4 0 1 4   SAB TAB Ectopic Multiple Live Births   0 1 0 0 4      # Outcome Date GA Lbr Jovi/2nd Weight Sex Delivery Anes PTL Lv   6 Current            5 Term 10/28/11    M Vag-Spont EPI N MT      Name: Jim   4 Term 03    M Vag-Spont EPI Y MT      Name: sebastian juaresnelida   3 TAB            2 Term     F  EPI N MT   1 Term     M -SEC EPI N MT      Complications: Breech presentation       EDC: Estimated Date of Delivery: Dec 15, 2020    Prenatal Labs:   Lab Results   Component Value Date    HGB 12.5 2020       GBS Status: negative    Active Problem List  Patient Active Problem List   Diagnosis     Depressive disorder     Anxiety and depression     Encounter for triage in pregnant patient     Normal labor       Medication Prior to Admission  Medications Prior to Admission   Medication Sig Dispense Refill Last Dose     acyclovir (ZOVIRAX) 400 MG tablet Take 1 tablet (400 mg) by mouth every 12 hours 60 tablet 1      Misc. Devices (BREAST PUMP) MISC Dispense 1 double electric breast pump per patient preference and insurance coverage. Length of need: 1 year 1 each 0      Prenatal Vit-Fe Fumarate-FA (PNV PRENATAL PLUS MULTIVITAMIN) 27-1 MG TABS per tablet Take 1 tablet by mouth daily      .        Maternal Past Medical History:     Past Medical History:   Diagnosis Date     Personal history of other  mental and behavioral disorders     No Comments Provided     Past Surgical History:   Procedure Laterality Date      SECTION      No Comments Provided     EXTRACTION(S) DENTAL      No Comments Provided                       Family History:     Family History   Problem Relation Age of Onset     Diabetes Mother         Diabetes     Hypertension Mother         Hypertension     Thyroid Disease Mother         Thyroid Disease     Thyroid Disease Sister         hashimoto's (dx 2018)     Heart Disease Maternal Grandmother         Heart Disease     Hypertension Maternal Grandmother         Hypertension     Lung Cancer Maternal Grandmother         ? cervical cancer in 20s.      Heart Disease Maternal Grandfather         Heart Disease     Prostate Cancer Maternal Grandfather         Cancer-prostate     Diabetes Maternal Grandfather         Diabetes     Hypertension Maternal Grandfather         Hypertension     Hypertension Maternal Aunt         Hypertension     Thyroid Disease Maternal Aunt         Thyroid Disease     Hypertension Paternal Aunt         Hypertension     Breast Cancer No family hx of         Cancer-breast     Colon Cancer No family hx of         Cancer-colon     Ovarian Cancer No family hx of         Cancer-ovarian     Other - See Comments No family hx of         Stroke     Blood Disease No family hx of         Blood Disease               Social History:     Social History     Tobacco Use     Smoking status: Never Smoker     Smokeless tobacco: Never Used   Substance Use Topics     Alcohol use: Not Currently     Alcohol/week: 0.0 standard drinks     Comment: occasional            Review of Systems:   The Review of Systems is negative other than noted in the HPI          Physical Exam:     Patient Vitals for the past 8 hrs:   BP Temp Temp src Resp SpO2   20 0500 128/82 97.1  F (36.2  C) Temporal 14 97 %     Gen: resting in bed, NAD  CV: RRR  Resp: CTAB  Abd: gravid, soft, nontender  Ext:  nontender    Cervix: tight 3cm upon arrival, 4.5 cm 2 hrs later. No lesions.  Membranes: intact  EFW: 7 lb  Presentation:Cephalic    Fetal Heart Rate Tracin, mod variability, + accels, no decels  Tocometer: 3 in 10 min        Assessment:   Nato Bradley is a 35 year old  at 38w1d admitted for trial of labor after  delivery.          Plan:   Labor: spontaneous, OR notified of patient admission for TOLAC  Pain: plans epidural  FWB: Cat I, reactive. EFW 7 lbs  Rh positive, RI, GBS negative  Anticipate     Peri Henry MD

## 2020-12-02 NOTE — PROGRESS NOTES
Legal documents reviewed with primary nurse, supervisor, and risk management.    Fabienne Dutta RN on 12/2/2020 at 3:48 PM

## 2020-12-02 NOTE — PLAN OF CARE
Epidural working well. Mom resting in bed, reports very mild pain from contractions. Dad sleeping at bedside.  Fabienne Dutta RN on 12/2/2020 at 11:48 AM

## 2020-12-02 NOTE — PROGRESS NOTES
Has had category one tracing through out the day, ANNIE KAPOOR has viewed FHM strip on and off  through out the day.

## 2020-12-03 ENCOUNTER — HOSPITAL ENCOUNTER (INPATIENT)
Facility: OTHER | Age: 35
End: 2020-12-03
Attending: STUDENT IN AN ORGANIZED HEALTH CARE EDUCATION/TRAINING PROGRAM | Admitting: OBSTETRICS & GYNECOLOGY
Payer: COMMERCIAL

## 2020-12-03 VITALS
HEART RATE: 78 BPM | DIASTOLIC BLOOD PRESSURE: 86 MMHG | SYSTOLIC BLOOD PRESSURE: 136 MMHG | RESPIRATION RATE: 18 BRPM | OXYGEN SATURATION: 98 % | TEMPERATURE: 96.6 F

## 2020-12-03 LAB
BLOOD BANK CMNT PATIENT-IMP: NORMAL
HGB BLD-MCNC: 10.4 G/DL (ref 11.7–15.7)
T PALLIDUM AB SER QL: NONREACTIVE

## 2020-12-03 PROCEDURE — 250N000013 HC RX MED GY IP 250 OP 250 PS 637: Performed by: OBSTETRICS & GYNECOLOGY

## 2020-12-03 PROCEDURE — 36415 COLL VENOUS BLD VENIPUNCTURE: CPT | Performed by: OBSTETRICS & GYNECOLOGY

## 2020-12-03 PROCEDURE — 85018 HEMOGLOBIN: CPT | Performed by: OBSTETRICS & GYNECOLOGY

## 2020-12-03 PROCEDURE — 722N000001 HC LABOR CARE VAGINAL DELIVERY SINGLE

## 2020-12-03 PROCEDURE — 86900 BLOOD TYPING SEROLOGIC ABO: CPT | Performed by: OBSTETRICS & GYNECOLOGY

## 2020-12-03 RX ORDER — MODIFIED LANOLIN
OINTMENT (GRAM) TOPICAL
Status: DISCONTINUED | OUTPATIENT
Start: 2020-12-03 | End: 2020-12-04 | Stop reason: HOSPADM

## 2020-12-03 RX ORDER — AMOXICILLIN 250 MG
1 CAPSULE ORAL 2 TIMES DAILY
Status: DISCONTINUED | OUTPATIENT
Start: 2020-12-03 | End: 2020-12-04 | Stop reason: HOSPADM

## 2020-12-03 RX ORDER — IBUPROFEN 400 MG/1
800 TABLET, FILM COATED ORAL EVERY 6 HOURS PRN
Status: DISCONTINUED | OUTPATIENT
Start: 2020-12-03 | End: 2020-12-04 | Stop reason: HOSPADM

## 2020-12-03 RX ORDER — BISACODYL 10 MG
10 SUPPOSITORY, RECTAL RECTAL DAILY PRN
Status: DISCONTINUED | OUTPATIENT
Start: 2020-12-04 | End: 2020-12-04 | Stop reason: HOSPADM

## 2020-12-03 RX ORDER — AMOXICILLIN 250 MG
2 CAPSULE ORAL 2 TIMES DAILY
Status: DISCONTINUED | OUTPATIENT
Start: 2020-12-03 | End: 2020-12-04 | Stop reason: HOSPADM

## 2020-12-03 RX ORDER — OXYTOCIN 10 [USP'U]/ML
10 INJECTION, SOLUTION INTRAMUSCULAR; INTRAVENOUS
Status: DISCONTINUED | OUTPATIENT
Start: 2020-12-03 | End: 2020-12-04 | Stop reason: HOSPADM

## 2020-12-03 RX ORDER — HYDROCORTISONE 2.5 %
CREAM (GRAM) TOPICAL 3 TIMES DAILY PRN
Status: DISCONTINUED | OUTPATIENT
Start: 2020-12-03 | End: 2020-12-04 | Stop reason: HOSPADM

## 2020-12-03 RX ORDER — ACETAMINOPHEN 325 MG/1
650 TABLET ORAL EVERY 4 HOURS PRN
Status: DISCONTINUED | OUTPATIENT
Start: 2020-12-03 | End: 2020-12-04 | Stop reason: HOSPADM

## 2020-12-03 RX ADMIN — IBUPROFEN 800 MG: 400 TABLET, FILM COATED ORAL at 03:31

## 2020-12-03 RX ADMIN — IBUPROFEN 800 MG: 400 TABLET, FILM COATED ORAL at 10:35

## 2020-12-03 RX ADMIN — DOCUSATE SODIUM 50 MG AND SENNOSIDES 8.6 MG 1 TABLET: 8.6; 5 TABLET, FILM COATED ORAL at 10:35

## 2020-12-03 RX ADMIN — IBUPROFEN 800 MG: 400 TABLET, FILM COATED ORAL at 16:35

## 2020-12-03 NOTE — PLAN OF CARE
Patient complete at 2030.  Pushing effectively with coaching.  Nurse 1:1 continuous at the bedside.  Early decelerations with pushing. Resolve to baseline HR of 150s.

## 2020-12-03 NOTE — DISCHARGE SUMMARY
VAGINAL DELIVERY DISCHARGE SUMMARY    Admit date: 2020  Discharge date: 12/3/2020     Admit Dx:   - 35 year old  at 38w1d   - spontaneous labor  - previous  delivery, desire for TOLAC    Discharge Dx:  - Same as above, s/p     Procedures:  - Spontaneous vaginal delivery  - Epidural analgesia    Admit HPI:  Ms. Nato Bradley is a   at 38w2d  who was admitted for spontaneous labor, desire for TOLAC, on 2020. She was a gestational carrier for a couple from [place]. Please see her admit H&P for full details of her PMH, PSH, Meds, Allergies and exam on admit.    Hospital course:  Nato Bradley was admitted to the hospital on 2020 for the above listed indications. Her labor progressed slowly to 6cm despite AROM and she was augmented with pitocin. She then progressed normally to compete dilation. She pushed effectively for 45 minutes and delivered a viable female infant, NILES position, without complication. APGARS were 9/9. Weight was 6lb 11.3oz. EBL from the delivery was 150. Please see her Delivery Summary for full details regarding her delivery.    Her postpartum course was complicated by nothing. On PPD#1, she was meeting all of her postpartum goals and deemed stable for discharge. She was voiding without difficulty, tolerating a regular diet without nausea and vomiting, her pain was well controlled on oral pain medicines and her lochia was appropriate. Her hemoglobin prior to delivery was 11.4 and after delivery was 10.4. Her Rh status was positive, and Rhogam was not indicated.     Discharge Medications:  Current Discharge Medication List      CONTINUE these medications which have NOT CHANGED    Details   [facility] Devices (BREAST PUMP) [facility] Dispense 1 double electric breast pump per patient preference and insurance coverage. Length of need: 1 year  Qty: 1 each, Refills: 0    Associated Diagnoses: 37 weeks gestation of pregnancy      Prenatal Vit-Fe Fumarate-FA (PNV PRENATAL  PLUS MULTIVITAMIN) 27-1 MG TABS per tablet Take 1 tablet by mouth daily         STOP taking these medications       acyclovir (ZOVIRAX) 400 MG tablet Comments:   Reason for Stopping:               Discharge/Disposition:  Nato Bradley was discharged to home in stable condition with the following instructions/medications:  1) Call for temperature > 100.4, bright red vaginal bleeding >1 pad an hour x 2 hours, foul smelling vaginal discharge, pain not controlled by usual oral pain meds, persistent nausea and vomiting not controlled on medications  2) She was using vasectomy for contraception  3) She will be pumping for surrogate family  4) She was instructed to follow-up with Dr SHEFALI Henry in 6 weeks for a routine postpartum visit.      Peri Henry MD  OBGYN  12/3/2020 4:50 PM

## 2020-12-03 NOTE — L&D DELIVERY NOTE
OB Delivery Note    Patient is a 35 year old  who presented at 38w1d in spontaneous labor. She had a history of prior  section with subsequent  and desired TOLAC. She progressed slowly throughout the day despite AROM with an irregular contraction pattern to 6cm. She received pitocin augmentation and then progressed normally to complete dilation. She pushed effectively for 45 minutes and delivered a viable female infant, NILES position, without complication. Infant was immediately handed to biological mother. Apgars 9/9. Weight 6lb 11.3oz. Placenta delivered spontaneously and appeared intact. No lacerations. .    Peri Henry MD  OB/GYN  2020 10:36 PM

## 2020-12-03 NOTE — PROGRESS NOTES
Social  Services:    I received a call from Tarun at [facility] [employer] [facility] asking if he could e-mail me paperwork for the parties involved in the surrogate delivery and hand to them.  He asked if we had notary services in the hospital.  I told him we do have notary services.  I printed documents and delivered them to [facility].  Once they are notarized   will fax back over to [employer] [facility].    DERIAN Espinoza on 12/3/2020 at 2:08 PM    :    I called House Supervisor and it appears both parties are going to wait to notarize paperwork.  I left cover sheet in [facility] chart incase they decided to go ahead with a notary. [facility] could fax information over to [facility] if needed.  I called  [employer] and gave Tarun update.    DERIAN Espinoza on 12/3/2020 at 3:14 PM

## 2020-12-03 NOTE — PLAN OF CARE
Patient ambulated to bathroom with standby assist.  Voided with some difficulty.  Bleeding light. No clots. Patient demonstrated pericares.

## 2020-12-03 NOTE — PROGRESS NOTES
Baby girl born vaginally over intact perineum by Dr. SHEFALI Henry at 2114.  Placenta delivered spontaneously.  Pitocin infusing. Patient in stable condition.

## 2020-12-03 NOTE — PROGRESS NOTES
OB/GYN Postpartum Note      S:  Patient is feeling well this morning, just tired.  Lochia light.  Eating and drinking without nausea.  Ambulating without difficulty.  Pain is well controlled. Breastfeeding for now, will pump after discharge.     O:   Vitals:    20 2246 20 2300 20 2315 20 0051   BP: 128/73 118/64 122/68 103/57   Pulse:       Resp:       Temp:    98  F (36.7  C)   TempSrc:       SpO2:    98%     General: resting in bed, in NAD  Resp: nonlabored  Abdomen: soft, nontender, nondistended  Fundus firm at umbilicus+1    Hemoglobin   Date Value Ref Range Status   2020 10.4 (L) 11.7 - 15.7 g/dL Final   ]    A: Ms. Nato Bradley is a 35 year old  PPD #1 s/p     P:  Heme 11.4 >  > 10.4  GI: tolerating regular diet  Feeding: pumping  Contraception: vasectomy  Rubella Immune  Rh positive  Disposition: routine PP cares, discharge later today    Peri Henry MD  OB/GYN  12/3/2020 8:34 AM

## 2020-12-04 NOTE — DISCHARGE INSTRUCTIONS
Discharge Instructions        Call your Doctor if you have any of these symptoms:    * Chest pain or shortness of breath    *Seizures    *Thoughts of hurting yourself or someone else    * Bleeding, soaking through 1 pad/hr, or blood clots, the size of an egg of bigger     *Incision that is not healing    * Red or swollen leg, that is warm or painful to touch    *Temperature of 100.4 degrees F or higher    * Severe headache or vision changes    [facility]: [phone]

## 2020-12-04 NOTE — PLAN OF CARE
Patient discharged to home at 2230. She voiced understanding of reasons to be concerned and any follow up appointments.

## 2020-12-10 ENCOUNTER — OFFICE VISIT (OUTPATIENT)
Dept: OBGYN | Facility: OTHER | Age: 35
End: 2020-12-10
Attending: STUDENT IN AN ORGANIZED HEALTH CARE EDUCATION/TRAINING PROGRAM
Payer: COMMERCIAL

## 2020-12-10 VITALS
SYSTOLIC BLOOD PRESSURE: 122 MMHG | HEART RATE: 72 BPM | BODY MASS INDEX: 27.44 KG/M2 | DIASTOLIC BLOOD PRESSURE: 80 MMHG | WEIGHT: 140.5 LBS

## 2020-12-10 DIAGNOSIS — R51.9 POSTPARTUM HEADACHE: Primary | ICD-10-CM

## 2020-12-10 LAB
ALBUMIN MFR UR ELPH: 4 MG/DL (ref 1–14)
ALBUMIN SERPL-MCNC: 4 G/DL (ref 3.5–5.7)
ALP SERPL-CCNC: 130 U/L (ref 34–104)
ALT SERPL W P-5'-P-CCNC: 12 U/L (ref 7–52)
ANION GAP SERPL CALCULATED.3IONS-SCNC: 9 MMOL/L (ref 3–14)
AST SERPL W P-5'-P-CCNC: 13 U/L (ref 13–39)
BILIRUB SERPL-MCNC: 0.3 MG/DL (ref 0.3–1)
BUN SERPL-MCNC: 13 MG/DL (ref 7–25)
CALCIUM SERPL-MCNC: 9.4 MG/DL (ref 8.6–10.3)
CHLORIDE SERPL-SCNC: 104 MMOL/L (ref 98–107)
CO2 SERPL-SCNC: 25 MMOL/L (ref 21–31)
CREAT SERPL-MCNC: 0.81 MG/DL (ref 0.6–1.2)
CREAT UR-MCNC: 50 MG/DL
ERYTHROCYTE [DISTWIDTH] IN BLOOD BY AUTOMATED COUNT: 13.4 % (ref 10–15)
GFR SERPL CREATININE-BSD FRML MDRD: 81 ML/MIN/{1.73_M2}
GLUCOSE SERPL-MCNC: 95 MG/DL (ref 70–105)
HCT VFR BLD AUTO: 40.4 % (ref 35–47)
HGB BLD-MCNC: 12.9 G/DL (ref 11.7–15.7)
MCH RBC QN AUTO: 27.3 PG (ref 26.5–33)
MCHC RBC AUTO-ENTMCNC: 31.9 G/DL (ref 31.5–36.5)
MCV RBC AUTO: 86 FL (ref 78–100)
PLATELET # BLD AUTO: 487 10E9/L (ref 150–450)
POTASSIUM SERPL-SCNC: 4 MMOL/L (ref 3.5–5.1)
PROT SERPL-MCNC: 7.3 G/DL (ref 6.4–8.9)
PROT/CREAT 24H UR: 0.08 MG/G{CREAT}
RBC # BLD AUTO: 4.72 10E12/L (ref 3.8–5.2)
SODIUM SERPL-SCNC: 138 MMOL/L (ref 134–144)
WBC # BLD AUTO: 9 10E9/L (ref 4–11)

## 2020-12-10 PROCEDURE — 85027 COMPLETE CBC AUTOMATED: CPT | Mod: ZL | Performed by: STUDENT IN AN ORGANIZED HEALTH CARE EDUCATION/TRAINING PROGRAM

## 2020-12-10 PROCEDURE — 36415 COLL VENOUS BLD VENIPUNCTURE: CPT | Mod: ZL | Performed by: STUDENT IN AN ORGANIZED HEALTH CARE EDUCATION/TRAINING PROGRAM

## 2020-12-10 PROCEDURE — 84156 ASSAY OF PROTEIN URINE: CPT | Mod: ZL | Performed by: STUDENT IN AN ORGANIZED HEALTH CARE EDUCATION/TRAINING PROGRAM

## 2020-12-10 PROCEDURE — 80053 COMPREHEN METABOLIC PANEL: CPT | Mod: ZL | Performed by: STUDENT IN AN ORGANIZED HEALTH CARE EDUCATION/TRAINING PROGRAM

## 2020-12-10 PROCEDURE — 99207 PR OB VISIT-NO CHARGE - GICH ONLY: CPT | Performed by: STUDENT IN AN ORGANIZED HEALTH CARE EDUCATION/TRAINING PROGRAM

## 2020-12-10 ASSESSMENT — PAIN SCALES - GENERAL: PAINLEVEL: NO PAIN (0)

## 2020-12-10 NOTE — NURSING NOTE
Pt presents to clinic today for intermitting headaches x 1 week. Pt has been using tylenol and ibuprofen with relief but the headaches come right back.      Medication Reconciliation: complete  Rebekah Mosley LPN

## 2020-12-10 NOTE — PROGRESS NOTES
Chief Complaint: headaches since delivery     HPI:    Ms Bradley is a 35 year old , here to discuss headaches she has been experiencing since delivery. On 20 she had a delivery () at [facility] with Dr. Peri Henry. During the pregnancy she was followed regularly with Dr. Henry. This was an IVF conception and Nato was a surrogate carrier for the babies' biological parents. From reviewing her last prenatal visit, she had an uncomplicated pregnancy aside from a history of  section and genital HSV with no active outbreak and acyclovir suppression in the pregnancy. Her BP at that last visit on  was 110/70 mmHg. Her BP during her hospitalization were largely WNL with one reading 142/72 that was not sustained. She had no complications during the delivery.     After the delivery she started experiencing headaches a few days after she went home. She describes them as mild, more irritating than super painful that occur at inconsistent times every few days.  She denies any associated vision changes, RUQ pain. As this baby was carried as a surrogate, she has not been up with the baby, but has been waking up each night to breast pump once each night.      We discussed some common headache etiologies including dehydration, lack of sleep, poor nutrition and then the more dangerous etiologies including PreE in the postpartum setting, stroke or bleed.      Today we pavel preE labs and reviewed that they were negative for PreE: Cr 0.81, AST 13, ALT 12, P:C 0.08. BP today was 122/80 mmHg.    Plt were slightly elevated 487, discussed that this can sometimes be reactive from a recent bodily stress or blood loss/recooperation from delivery. Discussed that this can be rechecked in a month to ensure it has returned to normal.     OBHx  OB History    Para Term  AB Living   6 4 4 0 1 4   SAB TAB Ectopic Multiple Live Births   0 1 0 0 4      # Outcome Date GA Lbr Jovi/2nd Weight Sex Delivery Anes PTL  Lv   6 Current            5 Term 10/28/11    M Vag-Spont EPI N MT      Name: Jim   4 Term 03    M Vag-Spont EPI Y MT      Name: sebastian jeronimo   3 TAB            2 Term     F  EPI N MT   1 Term     M -SEC EPI N MT      Complications: Breech presentation        Past medical history:  Past Medical History:   Diagnosis Date     Personal history of other mental and behavioral disorders     No Comments Provided     Specifically denies VTE, DM, HTN either outside of pregnancy or gestational or bleeding disorders     Past Surgical History:  Past Surgical History:   Procedure Laterality Date      SECTION      No Comments Provided     EXTRACTION(S) DENTAL      No Comments Provided       Medications:  Current Outpatient Medications   Medication     Misc. Devices (BREAST PUMP) MISC     Prenatal Vit-Fe Fumarate-FA (PNV PRENATAL PLUS MULTIVITAMIN) 27-1 MG TABS per tablet     No current facility-administered medications for this visit.        Allergies:        No Known Allergies       Social History:  Social History     Tobacco Use     Smoking status: Never Smoker     Smokeless tobacco: Never Used   Substance Use Topics     Alcohol use: Not Currently     Alcohol/week: 0.0 standard drinks     Comment: occasional     Drug use: No       Family History:  Family History   Problem Relation Age of Onset     Diabetes Mother         Diabetes     Hypertension Mother         Hypertension     Thyroid Disease Mother         Thyroid Disease     Thyroid Disease Sister         hashimoto's (dx 2018)     Heart Disease Maternal Grandmother         Heart Disease     Hypertension Maternal Grandmother         Hypertension     Lung Cancer Maternal Grandmother         ? cervical cancer in 20s.      Heart Disease Maternal Grandfather         Heart Disease     Prostate Cancer Maternal Grandfather         Cancer-prostate     Diabetes Maternal Grandfather         Diabetes     Hypertension Maternal Grandfather          Hypertension     Hypertension Maternal Aunt         Hypertension     Thyroid Disease Maternal Aunt         Thyroid Disease     Hypertension Paternal Aunt         Hypertension     Breast Cancer No family hx of         Cancer-breast     Colon Cancer No family hx of         Cancer-colon     Ovarian Cancer No family hx of         Cancer-ovarian     Other - See Comments No family hx of         Stroke     Blood Disease No family hx of         Blood Disease     Specifically denies breast, ovarian, colon, pancreatic cancers  Specifically denies VTE, known familial thrombophilias and coagulopathies     ROS:     Skin: negative for rash, bruising  Eyes: negative for visual blurring, double vision or scotomata  Ears/Nose/Throat: negative for nasal congestion, vertigo  Respiratory: No shortness of breath, dyspnea on exertion, cough, or hemoptysis  Cardiovascular: negative for palpitations, chest pain, lower extremity edema and syncope or near-syncope  Gastrointestinal: negative for, nausea, vomiting and hematemesis, negative RUQ pain  Genitourinary: negative for, dysuria, frequency and urgency  Musculoskeletal: negative for, back pain and muscular weakness  Neurologic: negative for syncope, seizures and local weakness, + mild headaches throughout day  Psychiatric: negative for, anxiety, depression and hallucinations  Hematologic/Lymphatic/Immunologic: negative for, anemia, chills and fever        Physical Exam  /80   Pulse 72   Wt 63.7 kg (140 lb 8 oz)   LMP 03/05/2020 (Exact Date)   Breastfeeding No   BMI 27.44 kg/m      Gen:    Well-appearing, no acute distressed, well-groomed, alert  HEENT: Normocephalic, atraumatic  Cardiovascular: Regular rate. No peripheral edema, normal peripheral circulation  Pulm:   Symmetric chest rise, non-labored respirations  Abd: Soft, non-tender, uterus is firm and well-below umbilicus  Neuro: Cranial nerves all intact bilaterally, bilateral  strength 5/5, shoulder abduction 5/5  bilaterally, shoulder flexion 5/5, elbow flexion 5/5 bilaterally, elbow extension 5/5 bilaterally, hip flexion 5/5 bilaterally, hip extension 5/5 bilaterally, knee flexion 5/5 bilaterally, knee extension 5/5  Bilaterally, steady on feet, no balance issues. Oriented x4  Ext:      No LE edema, extremities warm and well perfused       Assessment/Plan  Ms. Bradley is a 35 year old  female here to discuss headaches after delivery. PreE ruled out due to normal BP and normal labs. Neuro exam normal and I do not feel there is a need for an emergent imaging study.     -Scheduled ibuprofen and tylenol alternating (schedule provided for her with daily max doses noted)  -Plan for follow up with PCP for further discussion of headaches if not better after a few days  -Will work on additional hydration as well.     CHARLEEN ALVARENGA MD on 12/10/2020 at 9:33 AM

## 2020-12-22 NOTE — PROGRESS NOTES
On 12/2/20 at 1729 50 mcg fentanyl given by epidural route to Nato Bradley.  Late entry on 12/22/20.

## 2020-12-27 ENCOUNTER — HEALTH MAINTENANCE LETTER (OUTPATIENT)
Age: 35
End: 2020-12-27

## 2020-12-29 ENCOUNTER — TELEPHONE (OUTPATIENT)
Dept: OBGYN | Facility: OTHER | Age: 35
End: 2020-12-29

## 2020-12-29 DIAGNOSIS — B37.2 YEAST INFECTION OF THE SKIN: Primary | ICD-10-CM

## 2020-12-29 RX ORDER — DICLOXACILLIN SODIUM 500 MG
500 CAPSULE ORAL 4 TIMES DAILY
Qty: 40 CAPSULE | Refills: 0 | Status: CANCELLED | OUTPATIENT
Start: 2020-12-29 | End: 2021-01-08

## 2020-12-29 NOTE — TELEPHONE ENCOUNTER
Nato calls today with breast concerns. She is currently exclusively pumping for the surrogate baby she just delivered and is experiencing sore, cracked nipples and blocked ducts. She notes that her nipples are cracked, white and peeling. Lanolin has not helped and she thinks it looks like when she previously had thrush.     The blocked duct is 1-2 inches below her right collar bone. She notices a lump and decreased production on that side. She does not think it is mastitis. The area is not red or hot and she has no fever, chills or body aches. improving, but not resolved, not red or hot.    To resolve these issues she has tried massage, heat, acupuncture, and frequent, hot showers. She also just got new pump parts and has the correct size flanges. She has noticed a small amount of improvement, but is still struggling.    She will be going out of town this week and is concerned with these issues worsening while she is gone. She is wondering if she can get a prescription for Nystatin cream (helped in the past) and an antibiotic to have on hand in case she develops mastitis.     Please review and advise.    Tigist Frost RN...................12/29/2020 10:42 AM

## 2020-12-30 RX ORDER — NYSTATIN 100000 U/G
CREAM TOPICAL 2 TIMES DAILY
Qty: 15 G | Refills: 0 | Status: SHIPPED | OUTPATIENT
Start: 2020-12-30 | End: 2021-05-23

## 2020-12-31 NOTE — TELEPHONE ENCOUNTER
The patient has been notified of this information and all questions answered. Discussed following up if worsening for evaluation.     Paloma Andujar RN on 12/31/2020 at 10:39 AM

## 2020-12-31 NOTE — TELEPHONE ENCOUNTER
Rx sent for nystatin. She should wipe off before pumping  Peri Henry MD  OB/GYN  12/30/2020 6:10 PM

## 2021-01-13 ENCOUNTER — PRENATAL OFFICE VISIT (OUTPATIENT)
Dept: OBGYN | Facility: OTHER | Age: 36
End: 2021-01-13
Attending: OBSTETRICS & GYNECOLOGY
Payer: COMMERCIAL

## 2021-01-13 VITALS
HEART RATE: 72 BPM | WEIGHT: 135 LBS | SYSTOLIC BLOOD PRESSURE: 108 MMHG | BODY MASS INDEX: 26.37 KG/M2 | DIASTOLIC BLOOD PRESSURE: 72 MMHG

## 2021-01-13 PROCEDURE — 99207 PR POST-PARTUM 6 WK VISIT - GICH ONLY: CPT | Performed by: OBSTETRICS & GYNECOLOGY

## 2021-01-13 ASSESSMENT — PAIN SCALES - GENERAL: PAINLEVEL: NO PAIN (0)

## 2021-01-13 NOTE — NURSING NOTE
Chief Complaint   Patient presents with     Postpartum Care     6 week post partum      Does not have the urge feeling to urinate   Belkis Manzo LPN........................1/13/2021  12:50 PM     Medication Reconciliation: completed   Belkis Manzo LPN  1/13/2021 12:49 PM

## 2021-01-13 NOTE — PROGRESS NOTES
6 week Postpartum Visit Note    S:  Ms. Nato Bradley is a 36 year old  here for her 6-week postpartum checkup.   - Had a  on 20- surrogate for a couple in [place]. Female infant  - Feeding Method: pumping.  Done sending milk to surrogate couple, planning to donate the rest to a milk bank  - Bleeding:  None.  Duration:  Stopped after 4 weeks.  Menses resumed:  No  - Bowel/Urinary problems:  does not have urge to urinate but is not having leakage. is able to empty completely.  - Mood: good    - Contraception Planned:  vasectomy  - She  has had intercourse since delivery and experienced  mild discomfort.  .    - Current tobacco use:  No  - Hx of Abuse:  No  ================================================================  ROS: 10 point ROS neg other than the symptoms noted above in the HPI.     O:  /72 (BP Location: Right arm, Patient Position: Sitting, Cuff Size: Adult Large)   Pulse 72   Wt 61.2 kg (135 lb)   LMP 2020 (Exact Date)   Breastfeeding Yes   BMI 26.37 kg/m    Gen: Well-appearing, NAD  Psych:  Appropriate mood and affect  Breast: Deferred. No concerns  Abd:  Benign and Soft, flat, non-tender  Pelvic: Well healed perineum. Normal vagina and cervix. Normal sized uterus, anteverted, nontender.    A/P:  Ms. Nato Bradley is a 36 year old  here for 6 week postpartum visit after , surrogate for couple in [place]. Doing well.  - Contraception: vasectomy  - Feeding: pumping  - Follow-up: annually or sooner lydia Henry MD  OB/GYN  2021 12:55 PM

## 2021-01-13 NOTE — LETTER
[facility]  [address]   [place] [address]  [phone]          January 13, 2021    RE:  Nato Bradley                                                                                                                                                       [address] [facility]   [place] [address]            To whom it may concern:    Nato Bradley is under my professional care for her obstetrics and gynecologic care. She had normal pap smears in 2008, 2010, 2013, 2015, 2018, and 2019. In 2018 her HPV was positive but was negative when tested again in 2019.    Sincerely,        Peri Henry MD

## 2021-03-01 ENCOUNTER — TELEPHONE (OUTPATIENT)
Dept: OBGYN | Facility: OTHER | Age: 36
End: 2021-03-01

## 2021-03-01 DIAGNOSIS — N61.0 MASTITIS, RIGHT, ACUTE: Primary | ICD-10-CM

## 2021-03-01 RX ORDER — DICLOXACILLIN SODIUM 500 MG
500 CAPSULE ORAL 4 TIMES DAILY
Qty: 40 CAPSULE | Refills: 0 | Status: SHIPPED | OUTPATIENT
Start: 2021-03-01 | End: 2021-03-11

## 2021-03-01 NOTE — TELEPHONE ENCOUNTER
Patient called with complaints of right breast tenderness and feels like she is feverish with sweats/chills. States she has had this a few times before and feels this is the same and that she had been dealing with a plugged milk duct and that seems to be better. Rx sent to her pharmacy and patient advised if no improvement in 48 hours she will need to be seen for an appointment. Patient voiced understanding and has no further questions or concerns at this time.  Maryjane Henry RN on 3/1/2021 at 9:51 AM

## 2021-03-03 ENCOUNTER — TELEPHONE (OUTPATIENT)
Dept: OBGYN | Facility: OTHER | Age: 36
End: 2021-03-03

## 2021-03-03 NOTE — TELEPHONE ENCOUNTER
Patient called to see if we had any record of infectious disease labs. No labs were drawn with new OB visit as patient was a surrogate. She was advised to call the fertility clinic or [facility] department for further assistance.    Tigist Frost RN...................3/3/2021 3:30 PM

## 2021-03-29 ENCOUNTER — MYC MEDICAL ADVICE (OUTPATIENT)
Dept: OBGYN | Facility: OTHER | Age: 36
End: 2021-03-29

## 2021-03-29 DIAGNOSIS — B37.2 YEAST INFECTION OF THE SKIN: Primary | ICD-10-CM

## 2021-03-29 RX ORDER — FLUCONAZOLE 200 MG/1
TABLET ORAL
Qty: 12 TABLET | Refills: 0 | Status: SHIPPED | OUTPATIENT
Start: 2021-03-29 | End: 2021-04-09

## 2021-04-07 ENCOUNTER — OFFICE VISIT (OUTPATIENT)
Dept: OBGYN | Facility: OTHER | Age: 36
End: 2021-04-07
Attending: OBSTETRICS & GYNECOLOGY
Payer: COMMERCIAL

## 2021-04-07 VITALS
BODY MASS INDEX: 26.17 KG/M2 | HEART RATE: 64 BPM | SYSTOLIC BLOOD PRESSURE: 108 MMHG | DIASTOLIC BLOOD PRESSURE: 64 MMHG | WEIGHT: 134 LBS

## 2021-04-07 DIAGNOSIS — B37.2 YEAST INFECTION OF THE SKIN: Primary | ICD-10-CM

## 2021-04-07 PROCEDURE — 99213 OFFICE O/P EST LOW 20 MIN: CPT | Performed by: OBSTETRICS & GYNECOLOGY

## 2021-04-07 RX ORDER — MICONAZOLE NITRATE 20 MG/G
CREAM TOPICAL 2 TIMES DAILY
Qty: 42.5 G | Refills: 1 | Status: SHIPPED | OUTPATIENT
Start: 2021-04-07 | End: 2021-05-23

## 2021-04-07 ASSESSMENT — PAIN SCALES - GENERAL: PAINLEVEL: MILD PAIN (2)

## 2021-04-07 NOTE — NURSING NOTE
Chief Complaint   Patient presents with     Breast Problem     Breast problem and Rash to Face        Medication Reconciliation: completed   Belkis Manzo LPN  4/7/2021 11:30 AM

## 2021-04-07 NOTE — PROGRESS NOTES
Follow-Up Visit    S: Ms. Nato Bradley is a 36 year old  here for breast yeast and a facial rash. She reports the facial rash started 1.5 months ago. It does not itch but it burns if she tries to put any cream on it. It started on her cheeks but is now also on her forehead. No other systemic symptoms. She would like to see a dermatologist. She also has thrush around her nipples. She is still pumping but weaning. She tried topical nystatin, which made the rash worse followed by diflucan, which she has almost completed. It started getting better but hasn't resolved. It does not hurt but her nipples keep cracking and peeling.     O:  /64 (BP Location: Right arm, Patient Position: Sitting, Cuff Size: Adult Regular)   Pulse 64   Wt 60.8 kg (134 lb)   Breastfeeding Yes   BMI 26.17 kg/m    Gen: Well-appearing, NAD  HEENT: raised, mildly erythematous rash over cheeks and forehead  Pulm: nonlabored  Breasts: upper outer quadrant of nipple on each breast peeling, non-erythematous, no underlying mass or tenderness.   Psych: appropriate mood and affect    A/P:  Ms. Nato Bradley is a 36 year old  here for facial and breast rash. Referral to dermatology placed, plans to see someone at [facility] in [place]. Her nipple rash looks consistent with yeast- will switch back to a topical, discussed applying breast milk after each pump and letting air dry. Will call if not improving in a week    Peri Henry MD  OB/GYN  2021 11:34 AM

## 2021-04-23 ENCOUNTER — OFFICE VISIT (OUTPATIENT)
Dept: FAMILY MEDICINE | Facility: OTHER | Age: 36
End: 2021-04-23
Attending: PHYSICIAN ASSISTANT
Payer: COMMERCIAL

## 2021-04-23 VITALS
HEART RATE: 62 BPM | RESPIRATION RATE: 16 BRPM | WEIGHT: 136.4 LBS | BODY MASS INDEX: 26.64 KG/M2 | DIASTOLIC BLOOD PRESSURE: 72 MMHG | SYSTOLIC BLOOD PRESSURE: 108 MMHG | TEMPERATURE: 97.3 F

## 2021-04-23 DIAGNOSIS — L30.9 DERMATITIS: Primary | ICD-10-CM

## 2021-04-23 LAB
ALBUMIN SERPL-MCNC: 4.4 G/DL (ref 3.5–5.7)
ALP SERPL-CCNC: 89 U/L (ref 34–104)
ALT SERPL W P-5'-P-CCNC: 10 U/L (ref 7–52)
ANION GAP SERPL CALCULATED.3IONS-SCNC: 7 MMOL/L (ref 3–14)
AST SERPL W P-5'-P-CCNC: 17 U/L (ref 13–39)
BASOPHILS # BLD AUTO: 0 10E9/L (ref 0–0.2)
BASOPHILS NFR BLD AUTO: 0.5 %
BILIRUB SERPL-MCNC: 0.2 MG/DL (ref 0.3–1)
BUN SERPL-MCNC: 15 MG/DL (ref 7–25)
CALCIUM SERPL-MCNC: 9.3 MG/DL (ref 8.6–10.3)
CHLORIDE SERPL-SCNC: 102 MMOL/L (ref 98–107)
CO2 SERPL-SCNC: 26 MMOL/L (ref 21–31)
CREAT SERPL-MCNC: 0.76 MG/DL (ref 0.6–1.2)
DIFFERENTIAL METHOD BLD: NORMAL
EOSINOPHIL # BLD AUTO: 0.2 10E9/L (ref 0–0.7)
EOSINOPHIL NFR BLD AUTO: 2.7 %
ERYTHROCYTE [DISTWIDTH] IN BLOOD BY AUTOMATED COUNT: 12.7 % (ref 10–15)
ESTRADIOL SERPL-MCNC: 56 PG/ML
GFR SERPL CREATININE-BSD FRML MDRD: 87 ML/MIN/{1.73_M2}
GLUCOSE SERPL-MCNC: 92 MG/DL (ref 70–105)
HCT VFR BLD AUTO: 40.6 % (ref 35–47)
HGB BLD-MCNC: 13.9 G/DL (ref 11.7–15.7)
IMM GRANULOCYTES # BLD: 0 10E9/L (ref 0–0.4)
IMM GRANULOCYTES NFR BLD: 0.2 %
LYMPHOCYTES # BLD AUTO: 2.4 10E9/L (ref 0.8–5.3)
LYMPHOCYTES NFR BLD AUTO: 28.8 %
MCH RBC QN AUTO: 30.5 PG (ref 26.5–33)
MCHC RBC AUTO-ENTMCNC: 34.2 G/DL (ref 31.5–36.5)
MCV RBC AUTO: 89 FL (ref 78–100)
MONOCYTES # BLD AUTO: 0.6 10E9/L (ref 0–1.3)
MONOCYTES NFR BLD AUTO: 7.5 %
NEUTROPHILS # BLD AUTO: 5.1 10E9/L (ref 1.6–8.3)
NEUTROPHILS NFR BLD AUTO: 60.3 %
PLATELET # BLD AUTO: 304 10E9/L (ref 150–450)
POTASSIUM SERPL-SCNC: 4.3 MMOL/L (ref 3.5–5.1)
PROT SERPL-MCNC: 7.7 G/DL (ref 6.4–8.9)
RBC # BLD AUTO: 4.55 10E12/L (ref 3.8–5.2)
SODIUM SERPL-SCNC: 135 MMOL/L (ref 134–144)
TSH SERPL DL<=0.05 MIU/L-ACNC: 1.23 IU/ML (ref 0.34–5.6)
WBC # BLD AUTO: 8.4 10E9/L (ref 4–11)

## 2021-04-23 PROCEDURE — 36415 COLL VENOUS BLD VENIPUNCTURE: CPT | Mod: ZL | Performed by: PHYSICIAN ASSISTANT

## 2021-04-23 PROCEDURE — 82670 ASSAY OF TOTAL ESTRADIOL: CPT | Mod: ZL | Performed by: PHYSICIAN ASSISTANT

## 2021-04-23 PROCEDURE — 84443 ASSAY THYROID STIM HORMONE: CPT | Mod: ZL | Performed by: PHYSICIAN ASSISTANT

## 2021-04-23 PROCEDURE — 85025 COMPLETE CBC W/AUTO DIFF WBC: CPT | Mod: ZL | Performed by: PHYSICIAN ASSISTANT

## 2021-04-23 PROCEDURE — 80053 COMPREHEN METABOLIC PANEL: CPT | Mod: ZL | Performed by: PHYSICIAN ASSISTANT

## 2021-04-23 PROCEDURE — 99213 OFFICE O/P EST LOW 20 MIN: CPT | Performed by: PHYSICIAN ASSISTANT

## 2021-04-23 RX ORDER — TRIAMCINOLONE ACETONIDE 1 MG/ML
LOTION TOPICAL 3 TIMES DAILY
Qty: 60 ML | Refills: 0 | Status: ON HOLD | OUTPATIENT
Start: 2021-04-23 | End: 2024-04-25

## 2021-04-23 ASSESSMENT — PATIENT HEALTH QUESTIONNAIRE - PHQ9: SUM OF ALL RESPONSES TO PHQ QUESTIONS 1-9: 0

## 2021-04-23 NOTE — PROGRESS NOTES
Nursing Notes:   Alexus Jane LPN  2021  2:52 PM  Signed  Chief Complaint   Patient presents with     Derm Problem     on face         Medication Reconciliation: complete    Alexus Jane LPN        HPI:    Nato Bradley is a 36 year old female who presents for rash.  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  She is still pumping breastmilk for families.  She has had a rash develop on her face over the last 2 months.  It is bumpy and hurts.  Rashes on her forehead, cheeks, chin, and upper neck.  No change in soap or make-up.  Itches after she washes it.  Tender at times.  Red and irritated.  No open wound, pus, drainage.  Does not change with eating, alcohol, food, stress.  Patient would like labs completed to rule out concerns.    Past Medical History:   Diagnosis Date     Personal history of other mental and behavioral disorders     No Comments Provided       Past Surgical History:   Procedure Laterality Date      SECTION      No Comments Provided     EXTRACTION(S) DENTAL      No Comments Provided       Family History   Problem Relation Age of Onset     Diabetes Mother         Diabetes     Hypertension Mother         Hypertension     Thyroid Disease Mother         Thyroid Disease     Thyroid Disease Sister         hashimoto's (dx 2018)     Heart Disease Maternal Grandmother         Heart Disease     Hypertension Maternal Grandmother         Hypertension     Lung Cancer Maternal Grandmother         ? cervical cancer in 20s.      Heart Disease Maternal Grandfather         Heart Disease     Prostate Cancer Maternal Grandfather         Cancer-prostate     Diabetes Maternal Grandfather         Diabetes     Hypertension Maternal Grandfather         Hypertension     Hypertension Maternal Aunt         Hypertension     Thyroid Disease Maternal Aunt         Thyroid Disease     Hypertension Paternal Aunt         Hypertension     Breast Cancer No family hx  of         Cancer-breast     Colon Cancer No family hx of         Cancer-colon     Ovarian Cancer No family hx of         Cancer-ovarian     Other - See Comments No family hx of         Stroke     Blood Disease No family hx of         Blood Disease       Social History     Tobacco Use     Smoking status: Never Smoker     Smokeless tobacco: Never Used   Substance Use Topics     Alcohol use: Not Currently     Alcohol/week: 0.0 standard drinks     Comment: occasional       Current Outpatient Medications   Medication Sig Dispense Refill     miconazole (MICATIN) 2 % external cream Apply topically 2 times daily 42.5 g 1     nystatin (MYCOSTATIN) 678105 UNIT/GM external cream Apply topically 2 times daily 15 g 0     Prenatal Vit-Fe Fumarate-FA (PNV PRENATAL PLUS MULTIVITAMIN) 27-1 MG TABS per tablet Take 1 tablet by mouth daily       triamcinolone (KENALOG) 0.1 % external lotion Apply topically 3 times daily 60 mL 0       No Known Allergies    REVIEW OF SYSTEMS:  Refer to HPI.    EXAM:   Vitals:    /72 (BP Location: Right arm, Patient Position: Sitting, Cuff Size: Adult Regular)   Pulse 62   Temp 97.3  F (36.3  C)   Resp 16   Wt 61.9 kg (136 lb 6.4 oz)   Breastfeeding Yes   BMI 26.64 kg/m      General Appearance: Pleasant, alert, appropriate appearance for age. No acute distress  Skin: Many lightly erythematous, lightly raised papules appreciated scattered throughout the forehead, nose, cheeks, chin, and upper neck.  Psychiatric Exam: Alert and oriented - appropriate affect.    PHQ Depression Screen  PHQ-9 SCORE 7/24/2019 8/16/2019 4/23/2021   PHQ-9 Total Score 2 0 0       ASSESSMENT AND PLAN:      ICD-10-CM    1. Dermatitis  L30.9 DERMATOLOGY ADULT REFERRAL     triamcinolone (KENALOG) 0.1 % external lotion     CBC and Differential     Comprehensive Metabolic Panel     TSH Reflex GH     Estradiol     Estradiol     TSH Reflex GH     Comprehensive Metabolic Panel     CBC and Differential       Wash your face with  lukewarm water.  Do not use salt.    Use Aquaphor or Eucerin cream a few times daily.    I would hold off on using make-up until symptoms calm down.  Then try 1 thing at a time to see if you develop an allergic reaction.    Referred to dermatology for consult.     Complete labs to rule out concerns including CBC, CMP, TSH, estradiol.  Labs are pending.    Patient Instructions   Wash your face with lukewarm water.  Do not use salt.    Use Aquaphor or Eucerin cream a few times daily.    I would hold off on using make-up until symptoms calm down.  Then try 1 thing at a time to see if you develop an allergic reaction.    Referred to dermatology for consult.       Allison iLnda PA-C PA-C..................4/23/2021 2:51 PM

## 2021-04-23 NOTE — NURSING NOTE
Chief Complaint   Patient presents with     Derm Problem     on face         Medication Reconciliation: complete    Alexus Jane, LPN

## 2021-04-23 NOTE — PATIENT INSTRUCTIONS
Wash your face with lukewarm water.  Do not use salt.    Use Aquaphor or Eucerin cream a few times daily.    I would hold off on using make-up until symptoms calm down.  Then try 1 thing at a time to see if you develop an allergic reaction.    Referred to dermatology for consult.

## 2021-05-23 ENCOUNTER — OFFICE VISIT (OUTPATIENT)
Dept: FAMILY MEDICINE | Facility: OTHER | Age: 36
End: 2021-05-23
Attending: FAMILY MEDICINE
Payer: COMMERCIAL

## 2021-05-23 VITALS
BODY MASS INDEX: 26.15 KG/M2 | SYSTOLIC BLOOD PRESSURE: 118 MMHG | TEMPERATURE: 97.8 F | HEIGHT: 60 IN | DIASTOLIC BLOOD PRESSURE: 76 MMHG | RESPIRATION RATE: 18 BRPM | OXYGEN SATURATION: 97 % | HEART RATE: 82 BPM | WEIGHT: 133.19 LBS

## 2021-05-23 DIAGNOSIS — R50.9 FEVER AND CHILLS: Primary | ICD-10-CM

## 2021-05-23 DIAGNOSIS — R05.9 COUGH: ICD-10-CM

## 2021-05-23 DIAGNOSIS — N61.0 MASTITIS: ICD-10-CM

## 2021-05-23 LAB
LABORATORY COMMENT REPORT: ABNORMAL
SARS-COV-2 RNA RESP QL NAA+PROBE: NORMAL
SARS-COV-2 RNA RESP QL NAA+PROBE: POSITIVE
SPECIMEN SOURCE: ABNORMAL
SPECIMEN SOURCE: NORMAL

## 2021-05-23 PROCEDURE — U0003 INFECTIOUS AGENT DETECTION BY NUCLEIC ACID (DNA OR RNA); SEVERE ACUTE RESPIRATORY SYNDROME CORONAVIRUS 2 (SARS-COV-2) (CORONAVIRUS DISEASE [COVID-19]), AMPLIFIED PROBE TECHNIQUE, MAKING USE OF HIGH THROUGHPUT TECHNOLOGIES AS DESCRIBED BY CMS-2020-01-R: HCPCS | Mod: ZL | Performed by: FAMILY MEDICINE

## 2021-05-23 PROCEDURE — U0005 INFEC AGEN DETEC AMPLI PROBE: HCPCS | Mod: ZL | Performed by: FAMILY MEDICINE

## 2021-05-23 PROCEDURE — C9803 HOPD COVID-19 SPEC COLLECT: HCPCS

## 2021-05-23 PROCEDURE — 99213 OFFICE O/P EST LOW 20 MIN: CPT | Performed by: FAMILY MEDICINE

## 2021-05-23 RX ORDER — DICLOXACILLIN SODIUM 500 MG
500 CAPSULE ORAL 4 TIMES DAILY
Qty: 40 CAPSULE | Refills: 0 | Status: SHIPPED | OUTPATIENT
Start: 2021-05-23 | End: 2021-06-02

## 2021-05-23 ASSESSMENT — MIFFLIN-ST. JEOR: SCORE: 1220.63

## 2021-05-23 ASSESSMENT — PAIN SCALES - GENERAL: PAINLEVEL: MILD PAIN (3)

## 2021-05-23 NOTE — NURSING NOTE
Patient presents to clinic experiencing fever of 103 at home which occur at night for past 2 days, left breast soreness, excessive sweating and cough.  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  Medication Reconciliation: complete    Flori Abernathy LPN

## 2021-05-23 NOTE — PROGRESS NOTES
Nursing Notes:   Flori Abernathy LPN  2021 12:04 PM  Signed  Patient presents to clinic experiencing fever of 103 at home which occur at night for past 2 days, left breast soreness, excessive sweating and cough.  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  Medication Reconciliation: complete    Flori Abernathy LPN         SUBJECTIVE:   CC:  Nato Bradley is a 36 year old female who presents to clinic today for the following health issues:  Fever     HPI  Nato Bradley is a 36 year old female who presents for evaluation of fever.   Onset two days ago.  T up to 103 each of the past two nights.  Headache when the temp is up.    She's been taking ibuprofen at night for her symptoms.   She is pumping - has a history of mastitis.  Cough at night.  No wheezing.  No sore throat.  Last night her ears felt plugged.  No rash.  No tick bite.  No diarrhea, no nausea.  No dysuria.      No known exposures.       No Known Allergies  Current Outpatient Medications   Medication     dicloxacillin (DYNAPEN) 500 MG capsule     Prenatal Vit-Fe Fumarate-FA (PNV PRENATAL PLUS MULTIVITAMIN) 27-1 MG TABS per tablet     triamcinolone (KENALOG) 0.1 % external lotion     No current facility-administered medications for this visit.       Past Medical History:   Diagnosis Date     Personal history of other mental and behavioral disorders     No Comments Provided      Past Surgical History:   Procedure Laterality Date      SECTION      No Comments Provided     EXTRACTION(S) DENTAL      No Comments Provided       Review of Systems     PHQ-2 Score:     PHQ-2 (  [employer]) 2021   Q1: Little interest or pleasure in doing things 0 0   Q2: Feeling down, depressed or hopeless 0 0   PHQ-2 Score 0 0         PHQ-9 SCORE 2019   PHQ-9 Total Score 2 0 0     OMAR-7 SCORE 7/10/2018 2019 2019   Total Score 16 14 4             OBJECTIVE:     /76 (BP Location: Left arm,  Patient Position: Sitting, Cuff Size: Adult Regular)   Pulse 82   Temp 97.8  F (36.6  C) (Tympanic)   Resp 18   Ht 1.524 m (5')   Wt 60.4 kg (133 lb 3 oz)   LMP 05/21/2021 (Exact Date)   SpO2 97%   Breastfeeding Yes   BMI 26.01 kg/m    Wt Readings from Last 3 Encounters:   05/23/21 60.4 kg (133 lb 3 oz)   04/23/21 61.9 kg (136 lb 6.4 oz)   04/07/21 60.8 kg (134 lb)       Body mass index is 26.01 kg/m .  Physical Exam  Vitals signs and nursing note reviewed.   Constitutional:       Appearance: Normal appearance.   HENT:      Head: Normocephalic.      Right Ear: Tympanic membrane normal.      Left Ear: Tympanic membrane normal.      Nose: Nose normal.      Mouth/Throat:      Pharynx: No oropharyngeal exudate or posterior oropharyngeal erythema.   Cardiovascular:      Rate and Rhythm: Normal rate and regular rhythm.   Pulmonary:      Effort: Pulmonary effort is normal. No respiratory distress.      Breath sounds: No wheezing.   Chest:      Comments: Mild left lateral breast/axillary tenderness without erythema, no LAD  Abdominal:      General: Abdomen is flat.      Palpations: Abdomen is soft.      Tenderness: There is no abdominal tenderness.   Lymphadenopathy:      Cervical: No cervical adenopathy.   Skin:     Findings: No rash.   Neurological:      Mental Status: She is alert.          No results found for any visits on 05/23/21.      ASSESSMENT/PLAN:     1. Fever and chills    2. Cough    3. Mastitis        Assessment & Plan   Problem List Items Addressed This Visit     None      Visit Diagnoses     Fever and chills    -  Primary    Relevant Medications    dicloxacillin (DYNAPEN) 500 MG capsule    Other Relevant Orders    Symptomatic COVID-19 Virus (Coronavirus) by PCR    Cough        Mastitis        Relevant Medications    dicloxacillin (DYNAPEN) 500 MG capsule        1.  Differential diagnosis of fever discussed including viral illness such as COVID and possible mastitis.   COVID testing obtained and  quarantine and exposure precautions discussed.  Due to possible mastitis, will start on dicloxacillin 500mg qid - ok to continue pumping while on this medication.  Continue over the counter analgesics/antipyretics as needed.  If COVID positive, stop antibiotics and consider additional respiratory support if needed.       JERSON BANKS MD  [facility] AND [facility]

## 2021-05-24 ENCOUNTER — MYC MEDICAL ADVICE (OUTPATIENT)
Dept: FAMILY MEDICINE | Facility: OTHER | Age: 36
End: 2021-05-24

## 2021-05-24 DIAGNOSIS — U07.1 2019 NOVEL CORONAVIRUS DISEASE (COVID-19): Primary | ICD-10-CM

## 2021-05-25 RX ORDER — ALBUTEROL SULFATE 90 UG/1
2 AEROSOL, METERED RESPIRATORY (INHALATION) EVERY 4 HOURS PRN
Qty: 18 G | Refills: 0 | Status: ON HOLD | OUTPATIENT
Start: 2021-05-25 | End: 2024-04-25

## 2021-05-26 ENCOUNTER — APPOINTMENT (OUTPATIENT)
Dept: CT IMAGING | Facility: OTHER | Age: 36
End: 2021-05-26
Attending: PHYSICIAN ASSISTANT
Payer: COMMERCIAL

## 2021-05-26 ENCOUNTER — HOSPITAL ENCOUNTER (EMERGENCY)
Facility: OTHER | Age: 36
Discharge: HOME | End: 2021-05-26
Attending: PHYSICIAN ASSISTANT | Admitting: PHYSICIAN ASSISTANT
Payer: COMMERCIAL

## 2021-05-26 VITALS
HEART RATE: 90 BPM | HEIGHT: 60 IN | RESPIRATION RATE: 20 BRPM | DIASTOLIC BLOOD PRESSURE: 89 MMHG | WEIGHT: 127 LBS | TEMPERATURE: 100.2 F | OXYGEN SATURATION: 96 % | BODY MASS INDEX: 24.94 KG/M2 | SYSTOLIC BLOOD PRESSURE: 150 MMHG

## 2021-05-26 DIAGNOSIS — R06.02 SHORTNESS OF BREATH: ICD-10-CM

## 2021-05-26 DIAGNOSIS — U07.1 PNEUMONIA DUE TO 2019 NOVEL CORONAVIRUS: ICD-10-CM

## 2021-05-26 DIAGNOSIS — J12.82 PNEUMONIA DUE TO 2019 NOVEL CORONAVIRUS: ICD-10-CM

## 2021-05-26 PROCEDURE — 999N000105 HC STATISTIC NO DOCUMENTATION TO SUPPORT CHARGE

## 2021-05-26 PROCEDURE — 99283 EMERGENCY DEPT VISIT LOW MDM: CPT | Performed by: PHYSICIAN ASSISTANT

## 2021-05-26 PROCEDURE — 94640 AIRWAY INHALATION TREATMENT: CPT

## 2021-05-26 PROCEDURE — 250N000013 HC RX MED GY IP 250 OP 250 PS 637: Performed by: PHYSICIAN ASSISTANT

## 2021-05-26 PROCEDURE — 71275 CT ANGIOGRAPHY CHEST: CPT

## 2021-05-26 PROCEDURE — 250N000011 HC RX IP 250 OP 636: Performed by: PHYSICIAN ASSISTANT

## 2021-05-26 PROCEDURE — 94664 DEMO&/EVAL PT USE INHALER: CPT

## 2021-05-26 PROCEDURE — 99285 EMERGENCY DEPT VISIT HI MDM: CPT | Mod: 25 | Performed by: PHYSICIAN ASSISTANT

## 2021-05-26 PROCEDURE — 999N000157 HC STATISTIC RCP TIME EA 10 MIN

## 2021-05-26 RX ORDER — IOPAMIDOL 755 MG/ML
100 INJECTION, SOLUTION INTRAVASCULAR ONCE
Status: COMPLETED | OUTPATIENT
Start: 2021-05-26 | End: 2021-05-26

## 2021-05-26 RX ORDER — ALBUTEROL SULFATE 90 UG/1
6 AEROSOL, METERED RESPIRATORY (INHALATION) ONCE
Status: COMPLETED | OUTPATIENT
Start: 2021-05-26 | End: 2021-05-26

## 2021-05-26 RX ORDER — CEPHALEXIN 500 MG/1
500 CAPSULE ORAL 2 TIMES DAILY
Qty: 20 CAPSULE | Refills: 0 | Status: SHIPPED | OUTPATIENT
Start: 2021-05-26 | End: 2021-06-05

## 2021-05-26 RX ORDER — AZITHROMYCIN 250 MG/1
500 TABLET, FILM COATED ORAL DAILY
Qty: 6 TABLET | Refills: 0 | Status: SHIPPED | OUTPATIENT
Start: 2021-05-26 | End: 2021-06-05

## 2021-05-26 RX ADMIN — IOPAMIDOL 100 ML: 755 INJECTION, SOLUTION INTRAVENOUS at 17:43

## 2021-05-26 RX ADMIN — ALBUTEROL SULFATE 6 PUFF: 90 AEROSOL, METERED RESPIRATORY (INHALATION) at 17:05

## 2021-05-26 ASSESSMENT — MIFFLIN-ST. JEOR: SCORE: 1192.57

## 2021-05-26 NOTE — ED PROVIDER NOTES
History     Chief Complaint   Patient presents with     Covid Concern     HPI  Nato Bradley is a 36 year old female who is postpartum 6 months currently breast-feeding. She was seen on  for evaluation where she had a fever during that visit she was evaluated she had some mild left breast discomfort without any erythema. She was placed on dicloxacillin for mastitis. She also had a COVID-19 test at that time. Further history from the patient the patient has had recent travel to [place]. She is developed some cough with mild shortness of breath over the last week. She does not have any headaches dizziness or disturbances no abdominal pain diarrhea constipation no rashes or trauma. She was advised that her COVID-19 test came back positive and to discontinue the dicloxacillin for her mastitis. Patient does not have any other associated symptoms advancing upper contributing factors than was described above.    Allergies:  No Known Allergies    Problem List:    Patient Active Problem List    Diagnosis Date Noted     Encounter for triage in pregnant patient 2020     Priority: Medium     Normal labor 2020     Priority: Medium     Anxiety and depression 2019     Priority: Medium     Depressive disorder 2006     Priority: Medium        Past Medical History:    Past Medical History:   Diagnosis Date     2019 novel coronavirus disease (COVID-19) 2021     Personal history of other mental and behavioral disorders        Past Surgical History:    Past Surgical History:   Procedure Laterality Date      SECTION      No Comments Provided     EXTRACTION(S) DENTAL      No Comments Provided       Family History:    Family History   Problem Relation Age of Onset     Diabetes Mother         Diabetes     Hypertension Mother         Hypertension     Thyroid Disease Mother         Thyroid Disease     Thyroid Disease Sister         hashimoto's (dx 2018)     Heart Disease Maternal Grandmother          Heart Disease     Hypertension Maternal Grandmother         Hypertension     Lung Cancer Maternal Grandmother         ? cervical cancer in 20s.      Heart Disease Maternal Grandfather         Heart Disease     Prostate Cancer Maternal Grandfather         Cancer-prostate     Diabetes Maternal Grandfather         Diabetes     Hypertension Maternal Grandfather         Hypertension     Hypertension Maternal Aunt         Hypertension     Thyroid Disease Maternal Aunt         Thyroid Disease     Hypertension Paternal Aunt         Hypertension     Breast Cancer No family hx of         Cancer-breast     Colon Cancer No family hx of         Cancer-colon     Ovarian Cancer No family hx of         Cancer-ovarian     Other - See Comments No family hx of         Stroke     Blood Disease No family hx of         Blood Disease       Social History:  Marital Status:   [2]  Social History     Tobacco Use     Smoking status: Never Smoker     Smokeless tobacco: Never Used   Substance Use Topics     Alcohol use: Not Currently     Alcohol/week: 0.0 standard drinks     Comment: occasional     Drug use: No        Medications:    albuterol (PROAIR HFA/PROVENTIL HFA/VENTOLIN HFA) 108 (90 Base) MCG/ACT inhaler  azithromycin (ZITHROMAX) 250 MG tablet  cephALEXin (KEFLEX) 500 MG capsule  Prenatal Vit-Fe Fumarate-FA (PNV PRENATAL PLUS MULTIVITAMIN) 27-1 MG TABS per tablet  triamcinolone (KENALOG) 0.1 % external lotion  dicloxacillin (DYNAPEN) 500 MG capsule          Review of Systems   Please see HPI for pertinent positives and negatives.  All other systems reviewed and found to be negative.      Physical Exam   BP: (!) 150/89  Pulse: 90  Temp: 100.2  F (37.9  C)  Resp: 20  Height: 152.4 cm (5')  Weight: 57.6 kg (127 lb)  SpO2: 95 %    Vitals:    05/26/21 1643 05/26/21 1705   BP: (!) 150/89    Pulse: 90    Resp: 20    Temp: 100.2  F (37.9  C)    TempSrc: Tympanic    SpO2: 95% 96%   Weight: 57.6 kg (127 lb)    Height: 1.524 m (5')           Physical Exam   Exam:  Constitutional: healthy, alert and no distress  Head: Normocephalic.    Neck: Neck supple.    ENT: ENT exam normal, no neck nodes or sinus tenderness  Cardiovascular: RRR. No murmurs, clicks gallops or rub  Respiratory: Patient does have bilateral coarse lung sounds at the bases. No evidence of any wheezing.  Gastrointestinal: Abdomen soft, non-tender. BS normal. No masses, organomegaly  : Deferred  Musculoskeletal: extremities normal- no gross deformities noted, gait normal and normal muscle tone  Skin: no suspicious lesions or rashes but the patient does have tanned skin secondary to being in [place]  Neurologic: Gait normal. Reflexes normal and symmetric. Sensation grossly WNL.  Psychiatric: mentation appears normal and affect normal/bright        ED Course        Procedures              Results for orders placed or performed during the hospital encounter of 05/26/21 (from the past 24 hour(s))   CT Chest Pulmonary Embolism w Contrast    Narrative    CT CHEST PULMONARY EMBOLISM W CONTRAST    HISTORY: 36 years Female with shortness of breath. Covid +    TECHNIQUE: Axial CT imaging of the chest was performed With  intravenous contrast. Coronal and sagittal reconstructions were  obtained.    COMPARISON: None    FINDINGS:  There is no evidence of pulmonary embolism.  There is no mediastinal or hilar or axillary lymphadenopathy.    There is extensive groundglass airspace opacity in the left lower  lobe. Smaller patchy areas of groundglass opacity are present within  the left upper lobe and right lower lobe. The lung apices are clear.         No concerning osseous lesions are identified      Impression    IMPRESSION: No evidence of pulmonary embolism.    Bilateral airspace opacities suggestive of pneumonia.    LAKEISHA PRADO MD       Medications   albuterol (PROAIR HFA/PROVENTIL HFA/VENTOLIN HFA) 108 (90 Base) MCG/ACT inhaler 6 puff (6 puffs Inhalation Given 5/26/21 1705)   iopamidol  (ISOVUE-370) solution 100 mL (100 mLs Intravenous Given 5/26/21 4949)       Assessments & Plan (with Medical Decision Making)     I have reviewed the nursing notes.    I have reviewed the findings, diagnosis, plan and need for follow up with the patient.  Differential diagnosis at this point include: asthma, COPD exacerbation, bronchitis, pulmonary edema, acute coronary syndromes, pulmonary embolism, pneumonia, pneumothorax.  Pleasant female who presents for evaluation with increasing shortness of breath over the last 7 days recent travel to [place] and COVID-19 positive. She is also postpartum 6 months. She is at risk for developing pulmonary embolus. A CT scan of her chest has been obtained and is noted above. She also received 6 puffs of albuterol MDI at the bedside.  Patient CT does show evidence of Covid pneumonia.  Although bacterial pneumonia cannot be completely ruled out.  But given her previous history as well as her positive Covid highly suspicious for Covid pneumonia.  In the meantime she will be placed on Keflex twice daily for 10 days with Zithromax to cover her for a secondary bacterial source.  She received albuterol MDI for at home and pulse oximetry.  Reassuring as there is no evidence of any pulmonary embolus on CT examination.  I explained my diagnostic considerations and recommendations and the patient voiced an understanding and was in agreement with the treatment plan. All questions were answered. We discussed potential side effects of any prescribed or recommended therapies, as well as expectations for response to treatments.  Nato was evaluated in the Emergency Department on 05/26/2021 for the symptoms described in the history of present illness. Patient was evaluated in the context of the global COVID-19 pandemic, which necessitated consideration that the patient might be at risk for infection with the SARS-CoV-2 virus that causes COVID-19. Institutional protocols and algorithms that  pertain to the evaluation of patients at risk for COVID-19 are in a state of rapid change based on information released by multiple regulatory bodies including the CDC and Federal and State Organizations. These policies and algorithms were followed during the patient's care in the ED.    In-person evaluation for moderate/severe dyspnea, hypoxia, and concern for higher acuity level -- All patients with moderate or severe dyspnea, an initial oxygen saturation ?94 percent on room air (if oximetry information available), or any symptoms suggestive of higher acuity level warrant in-person evaluation, either in the ED or in an outpatient clinic, depending upon the severity of findings.    Criteria for ED evaluation and likely hospital admission -- We typically refer patients with one or more of the following features to the ED for further management and likely hospital admission:    ?Severe dyspnea (dyspnea at rest, and interfering with the ability to speak in complete sentences)   ?Oxygen saturation on room air of ?90 percent, regardless of severity of dyspnea   ?Concerning alterations in mentation (eg, confusion, change in behavior, difficulty in rousing) or other signs and symptoms of hypoperfusion or hypoxia (eg, falls, hypotension, cyanosis, anuria, chest pain suggestive of acute coronary syndrome)     Home management without in-person evaluation for others -- The majority of patients without moderate or severe dyspnea, hypoxia (if oximetry information available), or symptoms suggestive of higher overall acuity level can remain at home for management without in-person evaluation, provided they can reliably report worsened symptoms and self-isolate for the anticipated duration of illness.    ?Patients without risk factors for severe disease (either established or possible) and without dyspnea are discharged to self-care at home; they do not need in-person evaluation or scheduled follow-up telehealth visits. They  receive instructions to contact their clinician with any worsening symptoms.  ?Patients without risk factors for severe disease (either established or possible) who have mild dyspnea do not need in-person evaluation but are scheduled for telehealth follow-up visits.  ?Patients who have any risk factors for severe disease (either established or possible) but without dyspnea are scheduled for telehealth follow-up visits by their primary provider.      Nato was cautioned that progressive respiratory symptoms, particularly worsening dyspnea, should prompt contact with their clinician for further evaluation.   All other care is generally supportive, similar to that advised for other acute viral illnesses:  ?We advise that patients stay well hydrated, particularly those patients with sustained or higher fevers, in whom insensible fluid losses may be significant.  ?Cough that is persistent, interferes with sleep, or causes discomfort can be managed with an over-the-counter cough medication as needed.  ?We advise rest as needed during the acute illness; for patients without hypoxia, frequent repositioning and ambulation is encouraged. In addition, we encourage all patients to advance activity as soon as tolerated during recovery.    Frequency of follow-up -- For patients with COVID-19 determined to be appropriate for home management with telehealth follow-up, the frequency of telehealth visits is determined by their risk for severe disease, severity of respiratory symptoms, and our comfort level with their ability to self-report worsening symptoms.     For most patients, telehealth visits are scheduled on days 4, 7, and 10 (following the onset of clinical illness).  However, for patients in whom we have the highest level of concern, it is recommend a follow up in 24 hours. These include:   Patients aged ?65 years who have one or more established or possible risk factors for severe disease.   Any patient with moderate  dyspnea at the time of initial evaluation.   Patients who would be possible candidates for inpatient admission but are being managed at home due to limited hospital resources and capacity.   Patients who we feel may not reliably report a deterioration in symptoms  For these patients, the frequency of subsequent telehealth visits can be reduced to every other day if the patient remains clinically stable.    In addition, we educated  Nato about the wide variability in time to symptom resolution and complete recovery from COVID-19. Although early data from [place] suggested that patients with mild disease recover in two weeks and those with more severe disease recover in three to six weeks, accumulating data suggest that the course of recovery is more variable, often longer, and may depend upon premorbid risk factors (eg, age, health status) in addition to illness severity     New Prescriptions    AZITHROMYCIN (ZITHROMAX) 250 MG TABLET    Take 2 tablets (500 mg) by mouth daily 2 tablets day 1   1 tablet days 2-5 days    CEPHALEXIN (KEFLEX) 500 MG CAPSULE    Take 1 capsule (500 mg) by mouth 2 times daily for 10 days       Final diagnoses:   Pneumonia due to 2019 novel coronavirus   Shortness of breath       5/26/2021   [facility]     Beny Giang PA-C  05/26/21 8496

## 2021-06-03 ENCOUNTER — OFFICE VISIT (OUTPATIENT)
Dept: FAMILY MEDICINE | Facility: OTHER | Age: 36
End: 2021-06-03
Attending: FAMILY MEDICINE
Payer: COMMERCIAL

## 2021-06-03 VITALS
BODY MASS INDEX: 24.92 KG/M2 | HEART RATE: 70 BPM | OXYGEN SATURATION: 97 % | SYSTOLIC BLOOD PRESSURE: 124 MMHG | WEIGHT: 127.6 LBS | TEMPERATURE: 98.4 F | DIASTOLIC BLOOD PRESSURE: 80 MMHG | RESPIRATION RATE: 16 BRPM

## 2021-06-03 DIAGNOSIS — M94.0 COSTOCHONDRITIS: Primary | ICD-10-CM

## 2021-06-03 DIAGNOSIS — J12.82 PNEUMONIA DUE TO 2019 NOVEL CORONAVIRUS: ICD-10-CM

## 2021-06-03 DIAGNOSIS — U07.1 PNEUMONIA DUE TO 2019 NOVEL CORONAVIRUS: ICD-10-CM

## 2021-06-03 DIAGNOSIS — Z20.822 COUGH WITH EXPOSURE TO COVID-19 VIRUS: ICD-10-CM

## 2021-06-03 DIAGNOSIS — R05.8 COUGH WITH EXPOSURE TO COVID-19 VIRUS: ICD-10-CM

## 2021-06-03 PROCEDURE — 99213 OFFICE O/P EST LOW 20 MIN: CPT | Performed by: FAMILY MEDICINE

## 2021-06-03 RX ORDER — BENZONATATE 100 MG/1
100 CAPSULE ORAL 3 TIMES DAILY PRN
Qty: 20 CAPSULE | Refills: 0 | Status: SHIPPED | OUTPATIENT
Start: 2021-06-03 | End: 2021-12-28

## 2021-06-03 ASSESSMENT — PAIN SCALES - GENERAL: PAINLEVEL: MILD PAIN (3)

## 2021-06-03 NOTE — NURSING NOTE
Chief Complaint   Patient presents with     RECHECK     Positive COVID on 5/23. No sx other than pain in chest when coughing.     Medication Reconciliation: complete    Sara Ugalde, LPN

## 2021-06-03 NOTE — PROGRESS NOTES
Nursing Notes:   Sara Ugalde LPN  6/3/2021  2:17 PM  Sign at exiting of workspace  Chief Complaint   Patient presents with     RECHECK     Positive COVID on 5/23. No sx other than pain in chest when coughing.     Medication Reconciliation: complete    Sara Ugalde LPN       SUBJECTIVE:  HPI: Nato Bradley is a 36 year old female here for ongoing chest discomfort- pain is 3/10 in mid chest. Had COVID-19 5/23 and was seen in ER 5/26 for COVID PNA. She was placed on Keflex twice daily for 10 days with Zithromax to cover her for a secondary bacterial source. She received albuterol MDI for at home and pulse oximetry. No PE on chest CT.  Has had a residual cough since covid. Today she comes up due to a pain in her sternum that started today. She notes it when coughing only. No pain w/ exertion. Pain is 3/10. Denies fevers, GI symptoms, SOB, wheezing. Denies abdominal pain.    12/2020 gave birth. She is a surrogate. Is still pumping.    Allergies:  No Known Allergies    ROS:  Constitutional, HEENT, cardiovascular, pulmonary, GI and  systems are negative, except as otherwise noted.    Past medical, surgical, and family history reviewed and updated as appropriate in the chart.  Relevant social history listed in HPI.    OBJECTIVE:  /80 (BP Location: Right arm, Patient Position: Sitting, Cuff Size: Adult Regular)   Pulse 70   Temp 98.4  F (36.9  C) (Tympanic)   Resp 16   Wt 57.9 kg (127 lb 9.6 oz)   LMP 05/21/2021 (Exact Date)   SpO2 97%   Breastfeeding Yes   BMI 24.92 kg/m      EXAM:  Constitutional: No acute distress. Well-groomed, well-hydrated and well-nourished.  Appears stated age.  Head: Normocephalic, atraumatic.  Eyes: EOMI, anicteric, PERRL  Ears: Normal TMs bilaterally. Normal auditory canals and external ears.   OroPharynx: Moist mucous membranes. Dental hygiene adequate. Normal buccal mucosa. Normal pharynx.  Neck: Supple, with no masses or nodes. No lymphadenopathy.  No  thyromegaly.  Respiratory: Non-labored respirations. Clear to auscultation bilaterally.  No wheezing, rhonchi, or rales.  Painful to palpation of sternum at the nipple line. No pain w/ deep inspiration.   Cardiovascular: Regular rate.  No murmur.  No lower extremity edema.  Abdominal: Soft, nontender, non-distended. No abnormal masses or organomegaly.  GI: Bowel sounds are present.  Skin: Warm, dry, intact.  No concerning rashes or lesions.  Musculoskeletal: Moves arms and legs equally and normally.  Normal tone and strength throughout.  Neurologic: A+Ox3. CN 2-12 grossly intact. Normal gait. No focal motor or sensory deficits. No tremor.  Psychiatric: Does not appear anxious or depressed.  Appropriate affect and insight.    ASSESSMENT/PLAN:   1. Costochondritis/pulled muscle from coughing  Plan: symptomatic treatment w/ tylenol/IBU PRN + heat packs, ok to continue pumping     2. Cough with exposure to COVID-19 virus  3. Pneumonia due to 2019 novel coronavirus  Comment: Lungs are clear. Pt's secondary bacterial infection is improving, but cough has lingered, which is expected. Will treat symptomatically.   - benzonatate (TESSALON) 100 MG capsule; Take 1 capsule (100 mg) by mouth 3 times daily as needed for cough  Dispense: 20 capsule; Refill: 0  -encouraged rest, hydration, healthy nutrition, PNV    RTC for annual physical exam.    Hui Olson MD  [facility] AND [facility]

## 2021-06-03 NOTE — PATIENT INSTRUCTIONS
Patient Education     Costochondritis  Costochondritis is inflammation of a rib or the cartilage that connects a rib to your breastbone (sternum). It causes soreness, and may cause chest pain that can be sharp or aching or feel like pressure. The pain may get worse with deep breathing, movement, or exercise. In some cases, the pain is mistaken for a heart attack. But the condition is not serious. Read on to learn more about the condition and how it can be treated.     What causes costochondritis?  The cause is not fully known. It may happen after a chest injury, chest infection, or bout of coughing. Some physical activities may lead to costochondritis. Large-breasted women may be more likely to have the condition. Often, the cause is unknown.   Diagnosing costochondritis  There is no test for costochondritis. The condition is diagnosed by the symptoms you have. Your healthcare provider will give you a physical exam. He or she will ask you about your symptoms and examine your chest for pain. In some cases, tests are done to rule out more serious problems. These tests may include tests such as chest X-ray, CT scan, or an ECG.   Treating costochondritis  If a cause is found, treatment for that will likely relieve the problem. Costochondritis often goes away on its own. The course of the condition varies from person to person. It usually lasts from weeks to months. In some cases, mild symptoms continue for months to years. To ease symptoms:     Take medicine as directed. These relieve pain and swelling. Ibuprofen or other NSAIDs are often advised. In some cases, you may be given prescription medicine, such as muscle relaxants.    Don't do activities that put stress on your chest or spine.    Apply a heating pad (set to warm, not high heat) to your breastbone several times a day.    Do stretching exercises as directed.  When to call the healthcare provider  Call the healthcare provider right away if you have any of  these:    Pain that is not relieved by medicine    Shortness of breath    Lightheadedness, dizziness, or fainting    Feeling of irregular heartbeat or fast pulse  Anyone with chest pain should see a healthcare provider, especially older adults and people at risk for heart disease.   [employer] last reviewed this educational content on 2/1/2020 2000-2021 [employer]. All rights reserved. This information is not intended as a substitute for professional medical care. Always follow your healthcare professional's instructions.           Patient Education     Chest Wall Pain: Costochondritis    The chest pain that you have had today is caused by costochondritis. This condition is caused by an inflammation of the cartilage joining your ribs to your breastbone. It's not caused by heart or lung problems. Your healthcare team has made sure that the chest pain you feel is not from a life threatening cause of chest pain such as heart attack, collapsed lung, blood clot in the lung, tear in the aorta, or esophageal rupture. The inflammation may have been brought on by a blow to the chest, lifting heavy objects, intense exercise, or an illness that made you cough and sneeze a lot. It often occurs during times of emotional stress. It can be painful, but it's not dangerous. It usually goes away in 1 to 2 weeks. But it may happen again. Rarely, a more serious condition may cause symptoms similar to costochondritis. That s why it s important to watch for the warning signs listed below.   Home care  Follow these guidelines when caring for yourself at home:    If you feel that emotional stress is a cause of your condition, try to figure out the sources of that stress. It may not be obvious. Learn ways to deal with the stress in your life. This can include regular exercise, muscle relaxation, meditation, or simply taking time out for yourself.    You may use acetaminophen, ibuprofen, or naproxen to control pain, unless  another pain medicine was prescribed. If you have liver or kidney disease or ever had a stomach ulcer, talk with your healthcare provider before using these medicines.    You can also help ease pain by using a hot, wet compress or heating pad. Use this with or without a medicated skin cream that helps relieves pain.    Do stretching exercise as advised by your provider. Typically rest is beneficial for the first few days. Avoid strenuous activity that worsens the pain.    Take any prescribed medicines as directed.  Follow-up care  Follow up with your healthcare provider, or as advised.   When to seek medical advice  Call your healthcare provider right away if any of these occur:    A change in the type of pain. Call if it feels different, becomes more serious, lasts longer, or spreads into your shoulder, arm, neck, jaw, or back.    Shortness of breath or pain gets worse when you breathe    Weakness, dizziness, or fainting    Cough with dark-colored sputum (phlegm) or blood    Abdominal pain    Dark red or black stools    Fever of 100.4 F (38 C) or higher, or as directed by your healthcare provider  [employer] last reviewed this educational content on 6/1/2019 2000-2021 [employer]. All rights reserved. This information is not intended as a substitute for professional medical care. Always follow your healthcare professional's instructions.

## 2021-10-09 ENCOUNTER — HEALTH MAINTENANCE LETTER (OUTPATIENT)
Age: 36
End: 2021-10-09

## 2021-12-28 ENCOUNTER — OFFICE VISIT (OUTPATIENT)
Dept: OBGYN | Facility: OTHER | Age: 36
End: 2021-12-28
Attending: OBSTETRICS & GYNECOLOGY
Payer: COMMERCIAL

## 2021-12-28 VITALS
HEART RATE: 60 BPM | HEIGHT: 60 IN | DIASTOLIC BLOOD PRESSURE: 72 MMHG | SYSTOLIC BLOOD PRESSURE: 120 MMHG | BODY MASS INDEX: 27.29 KG/M2 | WEIGHT: 139 LBS

## 2021-12-28 DIAGNOSIS — Z00.00 ANNUAL PHYSICAL EXAM: Primary | ICD-10-CM

## 2021-12-28 DIAGNOSIS — Z23 NEEDS FLU SHOT: ICD-10-CM

## 2021-12-28 DIAGNOSIS — Z12.4 CERVICAL CANCER SCREENING: ICD-10-CM

## 2021-12-28 DIAGNOSIS — Z31.69 ENCOUNTER FOR PRECONCEPTION CONSULTATION: ICD-10-CM

## 2021-12-28 LAB
FASTING STATUS PATIENT QL REPORTED: YES
GLUCOSE BLD-MCNC: 95 MG/DL (ref 70–105)
TSH SERPL DL<=0.005 MIU/L-ACNC: 1.33 MU/L (ref 0.4–4)

## 2021-12-28 PROCEDURE — 87624 HPV HI-RISK TYP POOLED RSLT: CPT | Mod: ZL | Performed by: OBSTETRICS & GYNECOLOGY

## 2021-12-28 PROCEDURE — 99395 PREV VISIT EST AGE 18-39: CPT | Mod: 25 | Performed by: OBSTETRICS & GYNECOLOGY

## 2021-12-28 PROCEDURE — 36415 COLL VENOUS BLD VENIPUNCTURE: CPT | Mod: ZL | Performed by: OBSTETRICS & GYNECOLOGY

## 2021-12-28 PROCEDURE — 82947 ASSAY GLUCOSE BLOOD QUANT: CPT | Mod: ZL | Performed by: OBSTETRICS & GYNECOLOGY

## 2021-12-28 PROCEDURE — 88142 CYTOPATH C/V THIN LAYER: CPT | Performed by: OBSTETRICS & GYNECOLOGY

## 2021-12-28 PROCEDURE — 84443 ASSAY THYROID STIM HORMONE: CPT | Mod: ZL | Performed by: OBSTETRICS & GYNECOLOGY

## 2021-12-28 PROCEDURE — 90471 IMMUNIZATION ADMIN: CPT | Performed by: OBSTETRICS & GYNECOLOGY

## 2021-12-28 PROCEDURE — 90686 IIV4 VACC NO PRSV 0.5 ML IM: CPT | Performed by: OBSTETRICS & GYNECOLOGY

## 2021-12-28 ASSESSMENT — MIFFLIN-ST. JEOR: SCORE: 1247

## 2021-12-28 ASSESSMENT — PAIN SCALES - GENERAL: PAINLEVEL: NO PAIN (0)

## 2021-12-28 NOTE — NURSING NOTE
Chief Complaint   Patient presents with     Gyn Exam     surragacy planning        Medication Reconciliation: complete    Belkis Manzo LPN........................12/28/2021  10:34 AM

## 2021-12-28 NOTE — PROGRESS NOTES
Annual Exam    HPI:    Nato Bradley is a 36 year old , here for well woman exam.  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 Due for a pap in February, desires to have done today. Had COVID in May, feels like she has fully recovered. Still has a facial rash, seen by dermatology who gave her a topical antifungal and an topical steroid, neither of which helped. No other concerns.     OBHx  OB History    Para Term  AB Living   6 5 5 0 1 5   SAB IAB Ectopic Multiple Live Births   0 1 0 0 5      # Outcome Date GA Lbr Jovi/2nd Weight Sex Delivery Anes PTL Lv   6 Term 20 38w1d   F    MT      Birth Comments: Surrogate   5 Term 16 38w0d   F  EPI N MT      Name: Clari   4 Term 14 38w0d   M CS-LTranv EPI N MT      Complications: Breech presentation      Name: Jerome   3 Term 10/28/11    M Vag-Spont EPI N MT      Name: Jim   2 Term 03    M Vag-Spont EPI Y MT      Name: Tripp Oliver IAB                PMHx:   Past Medical History:   Diagnosis Date     2019 novel coronavirus disease (COVID-19) 2021     Personal history of other mental and behavioral disorders     No Comments Provided      PSHx:   Past Surgical History:   Procedure Laterality Date     C/SECTION, LOW TRANSVERSE        SECTION      No Comments Provided     EXTRACTION(S) DENTAL      No Comments Provided      Meds:   Current Outpatient Medications   Medication     albuterol (PROAIR HFA/PROVENTIL HFA/VENTOLIN HFA) 108 (90 Base) MCG/ACT inhaler     Prenatal Vit-Fe Fumarate-FA (PNV PRENATAL PLUS MULTIVITAMIN) 27-1 MG TABS per tablet     triamcinolone (KENALOG) 0.1 % external lotion     No current facility-administered medications for this visit.       Allergies:   No Known Allergies    SocHx:   Social History     Tobacco Use     Smoking status: Never Smoker     Smokeless tobacco: Never Used   Vaping Use     Vaping Use: Never used   Substance Use Topics     Alcohol  use: Not Currently     Alcohol/week: 0.0 standard drinks     Comment: occasional     Drug use: No     , lives with her  and their children.     FamHx:   Family History   Problem Relation Age of Onset     Diabetes Mother         Diabetes     Hypertension Mother         Hypertension     Thyroid Disease Mother         Thyroid Disease     Thyroid Disease Sister         hashimoto's (dx 2018)     Heart Disease Maternal Grandmother         Heart Disease     Hypertension Maternal Grandmother         Hypertension     Lung Cancer Maternal Grandmother         ? cervical cancer in 20s.      Heart Disease Maternal Grandfather      Prostate Cancer Maternal Grandfather         Cancer-prostate     Diabetes Maternal Grandfather         Diabetes     Hypertension Maternal Grandfather         Hypertension     Hypertension Maternal Aunt         Hypertension     Thyroid Disease Maternal Aunt         Thyroid Disease     Hypertension Paternal Aunt         Hypertension     Breast Cancer No family hx of         Cancer-breast     Colon Cancer No family hx of         Cancer-colon     Ovarian Cancer No family hx of         Cancer-ovarian     Other - See Comments No family hx of         Stroke     Blood Disease No family hx of         Blood Disease     Breast Cancer Maternal Aunt      Cancer Maternal Grandfather         ROS: 10-Point ROS negative except as noted in HPI      Physical Exam  /72 (BP Location: Right arm, Patient Position: Sitting, Cuff Size: Adult Regular)   Pulse 60   Ht 1.524 m (5')   Wt 63 kg (139 lb)   LMP 12/02/2021 (Exact Date)   Breastfeeding No   BMI 27.15 kg/m    Gen: Well-appearing, NAD  HEENT: Normocephalic, atraumatic  Neck: Thyroid is not enlarged, no appreciable masses palpated. Non-tender  CV:  RRR, no m/r/g auscultated  Pulm: CTAB, no w/r/r auscultated  Abd: Soft, non-tender, non-distended  Ext: No LE edema, extremities warm and well perfused    Breast: Symmetric, no nipple discharge or  retraction, no axillary lymphadenopathy, no palpable nodules or masses bilaterally    Pelvic:  Normal appearing external female genitalia. Normal hair distribution. Vagina is without lesions. Moderate white discharge. Cervix normal, no lesions, no cervical motion tenderness. Uterus is small, mobile, non-tender, anteverted. No adnexal tenderness or masses      --Ideal BMI: 18.5-24.9  Current BMI: Body mass index is 27.15 kg/m .  --Underweight = <18.5  --Normal weight = 18.5-24.9  --Overweight = 25-29.9  --Obesity = >30    Assessment/Plan  Nato Bradley is a 36 year old  female here for annual exam.     1. Routine Health Maintenance:   - flu shot today  - declines COVID vaccine    2. Cervical cancer screening:  - pap collected today, if NILM and HPV negative- can return to routine screening    3. Surrogacy  - TSH, fasting glucose per clinic requirement. Plans to have ID testing done through the fertility clinic    Follow Up: annually or sooner with pregnancy    Peri Henry MD  OB/GYN  2021 11:00 AM

## 2021-12-30 LAB
HUMAN PAPILLOMA VIRUS 16 DNA: NEGATIVE
HUMAN PAPILLOMA VIRUS 18 DNA: NEGATIVE
HUMAN PAPILLOMA VIRUS FINAL DIAGNOSIS: NORMAL
HUMAN PAPILLOMA VIRUS OTHER HR: NEGATIVE

## 2022-01-03 ENCOUNTER — TELEPHONE (OUTPATIENT)
Dept: OBGYN | Facility: OTHER | Age: 37
End: 2022-01-03
Payer: COMMERCIAL

## 2022-01-03 NOTE — TELEPHONE ENCOUNTER
Katt from [facility] called and wondered if the OB clearance form was filled out on the patient.  Please advise.

## 2022-04-25 NOTE — TELEPHONE ENCOUNTER
Left message on machine to call back      Paloma Andujar RN on 12/31/2020 at 10:35 AM      no lesions,  no deformities,  no traumatic injuries,  no significant scars are present,  chest wall non-tender,  no masses present, breathing is unlabored without accessory muscle use, normal breath sounds

## 2022-05-11 ENCOUNTER — MYC MEDICAL ADVICE (OUTPATIENT)
Dept: FAMILY MEDICINE | Facility: OTHER | Age: 37
End: 2022-05-11
Payer: COMMERCIAL

## 2022-08-24 ENCOUNTER — APPOINTMENT (OUTPATIENT)
Dept: LAB | Facility: OTHER | Age: 37
End: 2022-08-24
Payer: COMMERCIAL

## 2022-09-14 DIAGNOSIS — Z32.00 PREGNANCY EXAMINATION OR TEST, PREGNANCY UNCONFIRMED: Primary | ICD-10-CM

## 2022-09-16 ENCOUNTER — LAB (OUTPATIENT)
Dept: LAB | Facility: OTHER | Age: 37
End: 2022-09-16
Payer: COMMERCIAL

## 2022-09-16 DIAGNOSIS — Z32.00 PREGNANCY EXAMINATION OR TEST, PREGNANCY UNCONFIRMED: ICD-10-CM

## 2022-09-16 LAB
B-HCG SERPL-ACNC: 61 IU/L
PROGEST SERPL-MCNC: >40 NG/ML

## 2022-09-16 PROCEDURE — 84144 ASSAY OF PROGESTERONE: CPT | Mod: ZL

## 2022-09-16 PROCEDURE — 36415 COLL VENOUS BLD VENIPUNCTURE: CPT | Mod: ZL

## 2022-09-16 PROCEDURE — 84702 CHORIONIC GONADOTROPIN TEST: CPT | Mod: ZL

## 2022-09-17 ENCOUNTER — HEALTH MAINTENANCE LETTER (OUTPATIENT)
Age: 37
End: 2022-09-17

## 2022-09-19 ENCOUNTER — LAB (OUTPATIENT)
Dept: LAB | Facility: OTHER | Age: 37
End: 2022-09-19
Payer: COMMERCIAL

## 2022-09-19 DIAGNOSIS — Z32.00 PREGNANCY EXAMINATION OR TEST, PREGNANCY UNCONFIRMED: ICD-10-CM

## 2022-09-19 LAB
B-HCG SERPL-ACNC: 59 IU/L
PROGEST SERPL-MCNC: >40 NG/ML

## 2022-09-19 PROCEDURE — 84144 ASSAY OF PROGESTERONE: CPT | Mod: ZL

## 2022-09-19 PROCEDURE — 36415 COLL VENOUS BLD VENIPUNCTURE: CPT | Mod: ZL

## 2022-09-19 PROCEDURE — 84702 CHORIONIC GONADOTROPIN TEST: CPT | Mod: ZL

## 2022-09-21 ENCOUNTER — LAB (OUTPATIENT)
Dept: LAB | Facility: OTHER | Age: 37
End: 2022-09-21
Attending: SPECIALIST
Payer: COMMERCIAL

## 2022-09-21 DIAGNOSIS — Z32.00 PREGNANCY EXAMINATION OR TEST, PREGNANCY UNCONFIRMED: ICD-10-CM

## 2022-09-21 LAB
B-HCG SERPL-ACNC: 27 IU/L
PROGEST SERPL-MCNC: >40 NG/ML

## 2022-09-21 PROCEDURE — 84144 ASSAY OF PROGESTERONE: CPT | Mod: ZL

## 2022-09-21 PROCEDURE — 36415 COLL VENOUS BLD VENIPUNCTURE: CPT | Mod: ZL

## 2022-09-21 PROCEDURE — 84702 CHORIONIC GONADOTROPIN TEST: CPT | Mod: ZL

## 2022-09-30 ENCOUNTER — LAB (OUTPATIENT)
Dept: LAB | Facility: OTHER | Age: 37
End: 2022-09-30
Attending: SPECIALIST
Payer: COMMERCIAL

## 2022-09-30 DIAGNOSIS — Z32.00 UNCONFIRMED PREGNANCY: ICD-10-CM

## 2022-09-30 LAB
B-HCG SERPL-ACNC: <1 IU/L
PROGEST SERPL-MCNC: 2 NG/ML

## 2022-09-30 PROCEDURE — 84702 CHORIONIC GONADOTROPIN TEST: CPT | Mod: ZL

## 2022-09-30 PROCEDURE — 84144 ASSAY OF PROGESTERONE: CPT | Mod: ZL

## 2022-09-30 PROCEDURE — 36415 COLL VENOUS BLD VENIPUNCTURE: CPT | Mod: ZL

## 2022-12-22 ENCOUNTER — LAB (OUTPATIENT)
Dept: LAB | Facility: OTHER | Age: 37
End: 2022-12-22
Payer: COMMERCIAL

## 2022-12-22 DIAGNOSIS — Z32.00 PREGNANCY EXAMINATION OR TEST, PREGNANCY UNCONFIRMED: Primary | ICD-10-CM

## 2022-12-22 LAB
HCG INTACT+B SERPL-ACNC: 3 MIU/ML
PROGEST SERPL-MCNC: 30 NG/ML

## 2022-12-22 PROCEDURE — 36415 COLL VENOUS BLD VENIPUNCTURE: CPT | Mod: ZL

## 2022-12-22 PROCEDURE — 84144 ASSAY OF PROGESTERONE: CPT | Mod: ZL

## 2022-12-22 PROCEDURE — 84702 CHORIONIC GONADOTROPIN TEST: CPT | Mod: ZL

## 2023-01-01 NOTE — ANESTHESIA POSTPROCEDURE EVALUATION
Patient: Nato Bradley    * No procedures listed *    Diagnosis:* No pre-op diagnosis entered *  Diagnosis Additional Information: No value filed.    Anesthesia Type:  Epidural    Note:  Anesthesia Post Evaluation    Patient location during evaluation: Bedside  Patient participation: Able to fully participate in evaluation  Level of consciousness: awake and alert  Pain management: adequate  Airway patency: patent  Cardiovascular status: acceptable  Respiratory status: acceptable  Hydration status: acceptable  PONV: none     Anesthetic complications: None    Comments: Patient states epidural made her legs numb but could still feel most of the contraction pain in abdomen. Up walking, no residual numbness or tingling, voiding without difficulty, denies fevers or chills.          Last vitals:  Vitals:    12/02/20 2315 12/03/20 0051 12/03/20 0920   BP: 122/68 103/57 122/74   Pulse:   74   Resp:   18   Temp:  98  F (36.7  C) 96.6  F (35.9  C)   SpO2:  98% (!) 80%         Electronically Signed By: LEIF Joshua CRNA  December 3, 2020  2:02 PM  
alcohol related

## 2023-01-28 ENCOUNTER — HEALTH MAINTENANCE LETTER (OUTPATIENT)
Age: 38
End: 2023-01-28

## 2023-02-17 ENCOUNTER — LAB (OUTPATIENT)
Dept: LAB | Facility: OTHER | Age: 38
End: 2023-02-17
Payer: COMMERCIAL

## 2023-02-17 DIAGNOSIS — E28.9 OVARIAN DYSFUNCTION: Primary | ICD-10-CM

## 2023-02-17 LAB
ESTRADIOL SERPL-MCNC: 155 PG/ML
FSH SERPL IRP2-ACNC: 3.4 MIU/ML
HCG INTACT+B SERPL-ACNC: <1 MIU/ML
HOLD SPECIMEN: NORMAL
LH SERPL-ACNC: 1.8 MIU/ML
PROGEST SERPL-MCNC: 4.9 NG/ML

## 2023-02-17 PROCEDURE — 36415 COLL VENOUS BLD VENIPUNCTURE: CPT | Mod: ZL

## 2023-02-17 PROCEDURE — 83001 ASSAY OF GONADOTROPIN (FSH): CPT | Mod: ZL

## 2023-02-17 PROCEDURE — 83002 ASSAY OF GONADOTROPIN (LH): CPT | Mod: ZL

## 2023-02-17 PROCEDURE — 84144 ASSAY OF PROGESTERONE: CPT | Mod: ZL

## 2023-02-17 PROCEDURE — 82670 ASSAY OF TOTAL ESTRADIOL: CPT | Mod: ZL

## 2023-02-17 PROCEDURE — 84702 CHORIONIC GONADOTROPIN TEST: CPT | Mod: ZL

## 2023-07-14 ENCOUNTER — OFFICE VISIT (OUTPATIENT)
Dept: FAMILY MEDICINE | Facility: OTHER | Age: 38
End: 2023-07-14
Payer: COMMERCIAL

## 2023-07-14 VITALS
WEIGHT: 139.3 LBS | HEIGHT: 60 IN | RESPIRATION RATE: 16 BRPM | HEART RATE: 71 BPM | SYSTOLIC BLOOD PRESSURE: 138 MMHG | DIASTOLIC BLOOD PRESSURE: 92 MMHG | OXYGEN SATURATION: 99 % | TEMPERATURE: 97.8 F | BODY MASS INDEX: 27.35 KG/M2

## 2023-07-14 DIAGNOSIS — M54.50 ACUTE LEFT-SIDED LOW BACK PAIN WITHOUT SCIATICA: Primary | ICD-10-CM

## 2023-07-14 DIAGNOSIS — N23 KIDNEY PAIN: ICD-10-CM

## 2023-07-14 LAB
ALBUMIN UR-MCNC: NEGATIVE MG/DL
ANION GAP SERPL CALCULATED.3IONS-SCNC: 10 MMOL/L (ref 7–15)
APPEARANCE UR: CLEAR
BASOPHILS # BLD AUTO: 0 10E3/UL (ref 0–0.2)
BASOPHILS NFR BLD AUTO: 1 %
BILIRUB UR QL STRIP: NEGATIVE
BUN SERPL-MCNC: 7 MG/DL (ref 6–20)
CALCIUM SERPL-MCNC: 9.4 MG/DL (ref 8.6–10)
CHLORIDE SERPL-SCNC: 102 MMOL/L (ref 98–107)
COLOR UR AUTO: YELLOW
CREAT SERPL-MCNC: 0.62 MG/DL (ref 0.51–0.95)
CRP SERPL-MCNC: 6.36 MG/L
DEPRECATED HCO3 PLAS-SCNC: 26 MMOL/L (ref 22–29)
EOSINOPHIL # BLD AUTO: 0.1 10E3/UL (ref 0–0.7)
EOSINOPHIL NFR BLD AUTO: 2 %
ERYTHROCYTE [DISTWIDTH] IN BLOOD BY AUTOMATED COUNT: 12.5 % (ref 10–15)
GFR SERPL CREATININE-BSD FRML MDRD: >90 ML/MIN/1.73M2
GLUCOSE SERPL-MCNC: 101 MG/DL (ref 70–99)
GLUCOSE UR STRIP-MCNC: NEGATIVE MG/DL
HCT VFR BLD AUTO: 42.5 % (ref 35–47)
HGB BLD-MCNC: 14.3 G/DL (ref 11.7–15.7)
HGB UR QL STRIP: NEGATIVE
HOLD SPECIMEN: NORMAL
IMM GRANULOCYTES # BLD: 0 10E3/UL
IMM GRANULOCYTES NFR BLD: 0 %
KETONES UR STRIP-MCNC: NEGATIVE MG/DL
LEUKOCYTE ESTERASE UR QL STRIP: NEGATIVE
LYMPHOCYTES # BLD AUTO: 1.7 10E3/UL (ref 0.8–5.3)
LYMPHOCYTES NFR BLD AUTO: 30 %
MCH RBC QN AUTO: 30.6 PG (ref 26.5–33)
MCHC RBC AUTO-ENTMCNC: 33.6 G/DL (ref 31.5–36.5)
MCV RBC AUTO: 91 FL (ref 78–100)
MONOCYTES # BLD AUTO: 0.4 10E3/UL (ref 0–1.3)
MONOCYTES NFR BLD AUTO: 7 %
NEUTROPHILS # BLD AUTO: 3.5 10E3/UL (ref 1.6–8.3)
NEUTROPHILS NFR BLD AUTO: 60 %
NITRATE UR QL: NEGATIVE
NRBC # BLD AUTO: 0 10E3/UL
NRBC BLD AUTO-RTO: 0 /100
PH UR STRIP: 7 [PH] (ref 5–9)
PLATELET # BLD AUTO: 256 10E3/UL (ref 150–450)
POTASSIUM SERPL-SCNC: 4.6 MMOL/L (ref 3.4–5.3)
RBC # BLD AUTO: 4.67 10E6/UL (ref 3.8–5.2)
SODIUM SERPL-SCNC: 138 MMOL/L (ref 136–145)
SP GR UR STRIP: 1.01 (ref 1–1.03)
UROBILINOGEN UR STRIP-MCNC: NORMAL MG/DL
WBC # BLD AUTO: 5.8 10E3/UL (ref 4–11)

## 2023-07-14 PROCEDURE — 81003 URINALYSIS AUTO W/O SCOPE: CPT | Mod: ZL

## 2023-07-14 PROCEDURE — 99213 OFFICE O/P EST LOW 20 MIN: CPT | Performed by: STUDENT IN AN ORGANIZED HEALTH CARE EDUCATION/TRAINING PROGRAM

## 2023-07-14 PROCEDURE — 85025 COMPLETE CBC W/AUTO DIFF WBC: CPT | Mod: ZL | Performed by: STUDENT IN AN ORGANIZED HEALTH CARE EDUCATION/TRAINING PROGRAM

## 2023-07-14 PROCEDURE — 86140 C-REACTIVE PROTEIN: CPT | Mod: ZL | Performed by: STUDENT IN AN ORGANIZED HEALTH CARE EDUCATION/TRAINING PROGRAM

## 2023-07-14 PROCEDURE — 87086 URINE CULTURE/COLONY COUNT: CPT | Mod: ZL | Performed by: STUDENT IN AN ORGANIZED HEALTH CARE EDUCATION/TRAINING PROGRAM

## 2023-07-14 PROCEDURE — 36415 COLL VENOUS BLD VENIPUNCTURE: CPT | Mod: ZL | Performed by: STUDENT IN AN ORGANIZED HEALTH CARE EDUCATION/TRAINING PROGRAM

## 2023-07-14 PROCEDURE — 80048 BASIC METABOLIC PNL TOTAL CA: CPT | Mod: ZL | Performed by: STUDENT IN AN ORGANIZED HEALTH CARE EDUCATION/TRAINING PROGRAM

## 2023-07-14 ASSESSMENT — PAIN SCALES - GENERAL: PAINLEVEL: NO PAIN (0)

## 2023-07-14 NOTE — PROGRESS NOTES
Assessment & Plan     (M54.50) Acute left-sided low back pain without sciatica  (primary encounter diagnosis)  Comment: Left-sided mid back pain, unsure of exact etiology.  Probable that it is musculoskeletal.  Her concern today was kidney infection as she is hoping to pursue surrogacy and does have a medical evaluation next week.  UA was unremarkable.  No blood in the urine.  No evidence of infection.  CBC, BMP, and CRP all reassuring.  CRP very slightly elevated, in and of itself does not signify significant infection.    Plan: CBC and Differential, Basic Metabolic Panel,         CRP inflammation, Extra SST Tube (LAB USE ONLY)    Discussed treating with Tylenol and or ibuprofen as well as continuing lots of fluids.  Follow-up with primary care if symptoms do continue to persist.  [facility] or go to the ER if symptoms worsen or change.  She is comfortable and agreeable with this plan.    (N23) Kidney pain  Comment: UA was completed today, unremarkable.  Plan: Urine Culture, UA reflex to Microscopic        Allison Agustin PA-C  [facility]    Subjective   Nato is a 38 year old, presenting for the following health issues:  Kidney Problem      HPI     Patient presents today for concerns of frequency with urination.  States she also had odor and burning with urination about a week and a half ago.  She took doxycycline 2 times a day for 1 week.  Those symptoms did improve though she still has had frequency.  Then over the last 1 to 2 days she has developed some left-sided back pain. She did take Tylenol last night which did seem to help with the back pain.    She denies any history of kidney stones.    Review of Systems     She denies any fevers, chills, sweats, burning urination, blood in her urine.        Objective    BP (!) 138/92 (BP Location: Right arm, Patient Position: Sitting)   Pulse 71   Temp 97.8  F (36.6  C) (Temporal)   Resp 16   Ht 1.524 m (5')   Wt 63.2 kg (139  lb 4.8 oz)   LMP 07/05/2023   SpO2 99%   BMI 27.21 kg/m    Body mass index is 27.21 kg/m .     Physical Exam     GENERAL: healthy, alert and no distress  EYES: Eyes grossly normal to inspection, PERRL and conjunctivae and sclerae normal  RESP: lungs clear to auscultation - no rales, rhonchi or wheezes  CV: regular rate and rhythm, normal S1 S2  ABDOMEN: soft, nontender, no hepatosplenomegaly, no masses and bowel sounds normal, no CVA tenderness  MS: no gross musculoskeletal defects noted, slight tenderness with palpation of the left mid back, no edema        Results for orders placed or performed in visit on 07/14/23   UA reflex to Microscopic     Status: Normal   Result Value Ref Range    Color Urine Yellow Colorless, Straw, Light Yellow, Yellow    Appearance Urine Clear Clear    Glucose Urine Negative Negative mg/dL    Bilirubin Urine Negative Negative    Ketones Urine Negative Negative mg/dL    Specific Gravity Urine 1.008 1.000 - 1.030    Blood Urine Negative Negative    pH Urine 7.0 5.0 - 9.0    Protein Albumin Urine Negative Negative mg/dL    Urobilinogen Urine Normal Normal, 2.0 mg/dL    Nitrite Urine Negative Negative    Leukocyte Esterase Urine Negative Negative    Narrative    Microscopic not indicated   Extra SST Tube (LAB USE ONLY)     Status: None   Result Value Ref Range    Hold Specimen [facility]    Basic Metabolic Panel     Status: Abnormal   Result Value Ref Range    Sodium 138 136 - 145 mmol/L    Potassium 4.6 3.4 - 5.3 mmol/L    Chloride 102 98 - 107 mmol/L    Carbon Dioxide (CO2) 26 22 - 29 mmol/L    Anion Gap 10 7 - 15 mmol/L    Urea Nitrogen 7.0 6.0 - 20.0 mg/dL    Creatinine 0.62 0.51 - 0.95 mg/dL    Calcium 9.4 8.6 - 10.0 mg/dL    Glucose 101 (H) 70 - 99 mg/dL    GFR Estimate >90 >60 mL/min/1.73m2   CRP inflammation     Status: Abnormal   Result Value Ref Range    CRP Inflammation 6.36 (H) <5.00 mg/L   CBC with platelets and differential     Status: None   Result Value Ref Range    WBC Count 5.8  4.0 - 11.0 10e3/uL    RBC Count 4.67 3.80 - 5.20 10e6/uL    Hemoglobin 14.3 11.7 - 15.7 g/dL    Hematocrit 42.5 35.0 - 47.0 %    MCV 91 78 - 100 fL    MCH 30.6 26.5 - 33.0 pg    MCHC 33.6 31.5 - 36.5 g/dL    RDW 12.5 10.0 - 15.0 %    Platelet Count 256 150 - 450 10e3/uL    % Neutrophils 60 %    % Lymphocytes 30 %    % Monocytes 7 %    % Eosinophils 2 %    % Basophils 1 %    % Immature Granulocytes 0 %    NRBCs per 100 WBC 0 <1 /100    Absolute Neutrophils 3.5 1.6 - 8.3 10e3/uL    Absolute Lymphocytes 1.7 0.8 - 5.3 10e3/uL    Absolute Monocytes 0.4 0.0 - 1.3 10e3/uL    Absolute Eosinophils 0.1 0.0 - 0.7 10e3/uL    Absolute Basophils 0.0 0.0 - 0.2 10e3/uL    Absolute Immature Granulocytes 0.0 <=0.4 10e3/uL    Absolute NRBCs 0.0 10e3/uL   CBC and Differential     Status: None    Narrative    The following orders were created for panel order CBC and Differential.  Procedure                               Abnormality         Status                     ---------                               -----------         ------                     CBC with platelets and d...[[id number]]                      Final result                 Please view results for these tests on the individual orders.

## 2023-07-16 LAB — BACTERIA UR CULT: NORMAL

## 2023-08-08 DIAGNOSIS — Z31.9 PROCREATIVE MANAGEMENT: Primary | ICD-10-CM

## 2023-08-09 ENCOUNTER — LAB (OUTPATIENT)
Dept: LAB | Facility: OTHER | Age: 38
End: 2023-08-09
Payer: COMMERCIAL

## 2023-08-09 ENCOUNTER — HOSPITAL ENCOUNTER (OUTPATIENT)
Dept: ULTRASOUND IMAGING | Facility: OTHER | Age: 38
Discharge: HOME | End: 2023-08-09
Attending: OBSTETRICS & GYNECOLOGY
Payer: COMMERCIAL

## 2023-08-09 DIAGNOSIS — Z31.7 ENCOUNTER FOR PROCREATIVE MANAGEMENT AND COUNSELING FOR GESTATIONAL CARRIER: ICD-10-CM

## 2023-08-09 DIAGNOSIS — Z31.9 PROCREATIVE MANAGEMENT: ICD-10-CM

## 2023-08-09 LAB
ESTRADIOL SERPL-MCNC: 311 PG/ML
LH SERPL-ACNC: 25.1 MIU/ML
PROGEST SERPL-MCNC: 0.8 NG/ML

## 2023-08-09 PROCEDURE — 83002 ASSAY OF GONADOTROPIN (LH): CPT | Mod: ZL

## 2023-08-09 PROCEDURE — 82670 ASSAY OF TOTAL ESTRADIOL: CPT | Mod: ZL

## 2023-08-09 PROCEDURE — 84144 ASSAY OF PROGESTERONE: CPT | Mod: ZL

## 2023-08-09 PROCEDURE — 36415 COLL VENOUS BLD VENIPUNCTURE: CPT | Mod: ZL

## 2023-08-09 PROCEDURE — 76857 US EXAM PELVIC LIMITED: CPT

## 2023-08-10 ENCOUNTER — LAB (OUTPATIENT)
Dept: LAB | Facility: OTHER | Age: 38
End: 2023-08-10
Attending: OBSTETRICS & GYNECOLOGY
Payer: COMMERCIAL

## 2023-08-10 DIAGNOSIS — Z31.9 UNSPECIFIED PROCREATIVE MANAGEMENT: Primary | ICD-10-CM

## 2023-08-10 LAB
ESTRADIOL SERPL-MCNC: 277 PG/ML
LH SERPL-ACNC: 34 MIU/ML
PROGEST SERPL-MCNC: 1.1 NG/ML

## 2023-08-10 PROCEDURE — 83002 ASSAY OF GONADOTROPIN (LH): CPT | Mod: ZL

## 2023-08-10 PROCEDURE — 36415 COLL VENOUS BLD VENIPUNCTURE: CPT | Mod: ZL

## 2023-08-10 PROCEDURE — 82670 ASSAY OF TOTAL ESTRADIOL: CPT | Mod: ZL

## 2023-08-10 PROCEDURE — 84144 ASSAY OF PROGESTERONE: CPT | Mod: ZL

## 2023-08-18 DIAGNOSIS — Z31.9 ENCOUNTER FOR INFERTILITY: Primary | ICD-10-CM

## 2023-08-24 ENCOUNTER — LAB (OUTPATIENT)
Dept: LAB | Facility: OTHER | Age: 38
End: 2023-08-24
Attending: OBSTETRICS & GYNECOLOGY
Payer: COMMERCIAL

## 2023-08-24 DIAGNOSIS — Z31.9 ENCOUNTER FOR INFERTILITY: ICD-10-CM

## 2023-08-24 LAB
ESTRADIOL SERPL-MCNC: 98 PG/ML
PROGEST SERPL-MCNC: 6.7 NG/ML

## 2023-08-24 PROCEDURE — 84144 ASSAY OF PROGESTERONE: CPT | Mod: ZL

## 2023-08-24 PROCEDURE — 36415 COLL VENOUS BLD VENIPUNCTURE: CPT | Mod: ZL

## 2023-08-24 PROCEDURE — 82670 ASSAY OF TOTAL ESTRADIOL: CPT | Mod: ZL

## 2023-08-30 ENCOUNTER — LAB (OUTPATIENT)
Dept: LAB | Facility: OTHER | Age: 38
End: 2023-08-30
Attending: OBSTETRICS & GYNECOLOGY
Payer: COMMERCIAL

## 2023-08-30 DIAGNOSIS — Z31.9 ENCOUNTER FOR INFERTILITY: ICD-10-CM

## 2023-08-30 DIAGNOSIS — Z31.9 UNSPECIFIED PROCREATIVE MANAGEMENT: Primary | ICD-10-CM

## 2023-08-30 LAB
ESTRADIOL SERPL-MCNC: 126 PG/ML
HCG INTACT+B SERPL-ACNC: 815 MIU/ML
PROGEST SERPL-MCNC: 6.7 NG/ML

## 2023-08-30 PROCEDURE — 84702 CHORIONIC GONADOTROPIN TEST: CPT | Mod: ZL

## 2023-08-30 PROCEDURE — 84144 ASSAY OF PROGESTERONE: CPT | Mod: ZL

## 2023-08-30 PROCEDURE — 82670 ASSAY OF TOTAL ESTRADIOL: CPT | Mod: ZL

## 2023-08-30 PROCEDURE — 36415 COLL VENOUS BLD VENIPUNCTURE: CPT | Mod: ZL

## 2023-09-04 ENCOUNTER — OFFICE VISIT (OUTPATIENT)
Dept: FAMILY MEDICINE | Facility: OTHER | Age: 38
End: 2023-09-04
Payer: COMMERCIAL

## 2023-09-04 VITALS
SYSTOLIC BLOOD PRESSURE: 110 MMHG | HEART RATE: 63 BPM | RESPIRATION RATE: 16 BRPM | TEMPERATURE: 98.4 F | HEIGHT: 60 IN | DIASTOLIC BLOOD PRESSURE: 70 MMHG | WEIGHT: 143 LBS | BODY MASS INDEX: 28.07 KG/M2 | OXYGEN SATURATION: 97 %

## 2023-09-04 DIAGNOSIS — R39.89 URINARY PROBLEM: Primary | ICD-10-CM

## 2023-09-04 DIAGNOSIS — N92.0 SPOTTING: ICD-10-CM

## 2023-09-04 DIAGNOSIS — Z34.91 FIRST TRIMESTER PREGNANCY: ICD-10-CM

## 2023-09-04 DIAGNOSIS — Z31.9 UNSPECIFIED PROCREATIVE MANAGEMENT: ICD-10-CM

## 2023-09-04 LAB
ALBUMIN UR-MCNC: NEGATIVE MG/DL
APPEARANCE UR: CLEAR
BACTERIA #/AREA URNS HPF: ABNORMAL /HPF
BILIRUB UR QL STRIP: NEGATIVE
COLOR UR AUTO: ABNORMAL
GLUCOSE UR STRIP-MCNC: NEGATIVE MG/DL
HCG INTACT+B SERPL-ACNC: 7401 MIU/ML
HGB UR QL STRIP: ABNORMAL
HOLD SPECIMEN: NORMAL
KETONES UR STRIP-MCNC: NEGATIVE MG/DL
LEUKOCYTE ESTERASE UR QL STRIP: NEGATIVE
MUCOUS THREADS #/AREA URNS LPF: PRESENT /LPF
NITRATE UR QL: NEGATIVE
PH UR STRIP: 6.5 [PH] (ref 5–9)
PROGEST SERPL-MCNC: 12.4 NG/ML
RBC URINE: 4 /HPF
SP GR UR STRIP: 1.01 (ref 1–1.03)
SQUAMOUS EPITHELIAL: 2 /HPF
UROBILINOGEN UR STRIP-MCNC: NORMAL MG/DL
WBC URINE: 2 /HPF

## 2023-09-04 PROCEDURE — 87086 URINE CULTURE/COLONY COUNT: CPT | Mod: ZL | Performed by: NURSE PRACTITIONER

## 2023-09-04 PROCEDURE — 36415 COLL VENOUS BLD VENIPUNCTURE: CPT | Mod: ZL,91 | Performed by: NURSE PRACTITIONER

## 2023-09-04 PROCEDURE — 99213 OFFICE O/P EST LOW 20 MIN: CPT | Performed by: NURSE PRACTITIONER

## 2023-09-04 PROCEDURE — 84702 CHORIONIC GONADOTROPIN TEST: CPT | Mod: ZL | Performed by: NURSE PRACTITIONER

## 2023-09-04 PROCEDURE — 84144 ASSAY OF PROGESTERONE: CPT | Mod: ZL | Performed by: NURSE PRACTITIONER

## 2023-09-04 PROCEDURE — 81001 URINALYSIS AUTO W/SCOPE: CPT | Mod: ZL | Performed by: NURSE PRACTITIONER

## 2023-09-04 PROCEDURE — 36415 COLL VENOUS BLD VENIPUNCTURE: CPT | Mod: ZL | Performed by: NURSE PRACTITIONER

## 2023-09-04 RX ORDER — ESTRADIOL 2 MG/1
TABLET ORAL
Status: ON HOLD | COMMUNITY
End: 2024-04-25

## 2023-09-04 RX ORDER — PROGESTERONE 200 MG/1
CAPSULE ORAL
Status: ON HOLD | COMMUNITY
Start: 2023-08-07 | End: 2024-04-25

## 2023-09-04 ASSESSMENT — PAIN SCALES - GENERAL: PAINLEVEL: NO PAIN (0)

## 2023-09-04 NOTE — PROGRESS NOTES
ASSESSMENT/PLAN:    I have reviewed the nursing notes.  I have reviewed the findings, diagnosis, plan and need for follow up with the patient.    1. Urinary problem  - UA Macroscopic with reflex to Microscopic and Culture  Urinalysis shows trace hematuria and few bacteria.  Nitrate negative, leukocyte negative, and only 2 white blood cells.  Will culture urine.  At this time no indication for antibiotics.    2. Spotting  - UA Macroscopic with reflex to Microscopic and Culture  - Progesterone  - HCG quantitative pregnancy  To compare to result from 5 days ago which showed hCG quant at 815. In process.  Recommend connecting with MD who is managing this pregnancy tomorrow with any further complaints and looking for his recommendations.  Low suspicion of yeast and suspect some vaginal irritation from pantiliner.  If there is bacterial growth on urine culture would consider if antibiotics are indicated.  Patient also needs to medicate with biological parents on any treatment etc.     3. First trimester pregnancy  - Progesterone  - HCG quantitative pregnancy    Discussed warning signs/symptoms indicative of need to f/u    Follow up if symptoms persist or worsen or concerns    I explained my diagnostic considerations and recommendations to the patient, who voiced understanding and agreement with the treatment plan. All questions were answered. We discussed potential side effects of any prescribed or recommended therapies, as well as expectations for response to treatments.    Sara Contreras NP  9/4/2023  4:09 PM    HPI:  Nato WAGGONER Kirk is a 38 year old female who [facility] for concerns of dark brown spotting and pantiliner that started today.  She is currently 5.5 weeks pregnant with a surrogacy pregnancy.  Dr. Zaman is managing this pregnancy is based out of [place].  She tells me that she is using daily progesterone suppository inserts since about August 16. Since starting these, she has also  had to wear a panty liner daily just because the suppository eventually comes out.  Uncertain if pantiliner is causing vaginal irritation but she is generally uncomfortable in the vaginal area.  She does not report any vaginal itch or significant discharge.  She does not report classic urinary symptoms.  She does have a history of bladder infections and this does not really feel like one.    Embryo transfer was .      She denies any bright red vaginal bleeding.      Past Medical History:   Diagnosis Date     novel coronavirus disease (COVID-19) 2021    Personal history of other mental and behavioral disorders     No Comments Provided     Past Surgical History:   Procedure Laterality Date    C/SECTION, LOW TRANSVERSE       SECTION      No Comments Provided    EXTRACTION(S) DENTAL      No Comments Provided     Social History     Tobacco Use    Smoking status: Never    Smokeless tobacco: Never   Substance Use Topics    Alcohol use: Not Currently     Alcohol/week: 0.0 standard drinks of alcohol     Comment: occasional     Current Outpatient Medications   Medication Sig Dispense Refill    albuterol (PROAIR HFA/PROVENTIL HFA/VENTOLIN HFA) 108 (90 Base) MCG/ACT inhaler Inhale 2 puffs into the lungs every 4 hours as needed for shortness of breath / dyspnea or wheezing 18 g 0    estradiol (ESTRACE) 2 MG tablet TAKE 2 TABLETS ORALLY TWICE DAILY.      Prenatal Vit-Fe Fumarate-FA (PNV PRENATAL PLUS MULTIVITAMIN) 27-1 MG TABS per tablet Take 1 tablet by mouth daily      progesterone (PROMETRIUM) 200 MG capsule Take one (1) tablet vaginally twice daily as directed      triamcinolone (KENALOG) 0.1 % external lotion Apply topically 3 times daily 60 mL 0     No Known Allergies  Past medical history, past surgical history, current medications and allergies reviewed and accurate to the best of my knowledge.      ROS:  Refer to HPI    /70 (BP Location: Left arm, Patient Position: Sitting, Cuff Size:  Adult Regular)   Pulse 63   Temp 98.4  F (36.9  C) (Tympanic)   Resp 16   Ht 1.524 m (5')   Wt 64.9 kg (143 lb)   LMP 07/05/2023   SpO2 97%   BMI 27.93 kg/m      EXAM:  General Appearance: Well appearing 38 year old female, appropriate appearance for age. No acute distress   Respiratory: No increased work of breathing.  No cough appreciated.  Neuro: Alert and oriented to person, place, and time.    Psychological: normal affect, alert, oriented, and pleasant.     Results for orders placed or performed in visit on 09/04/23   UA Macroscopic with reflex to Microscopic and Culture     Status: Abnormal    Specimen: Urine, Midstream   Result Value Ref Range    Color Urine Light Yellow Colorless, Straw, Light Yellow, Yellow    Appearance Urine Clear Clear    Glucose Urine Negative Negative mg/dL    Bilirubin Urine Negative Negative    Ketones Urine Negative Negative mg/dL    Specific Gravity Urine 1.014 1.000 - 1.030    Blood Urine Trace (A) Negative    pH Urine 6.5 5.0 - 9.0    Protein Albumin Urine Negative Negative mg/dL    Urobilinogen Urine Normal Normal, 2.0 mg/dL    Nitrite Urine Negative Negative    Leukocyte Esterase Urine Negative Negative    Bacteria Urine Few (A) None Seen /HPF    Mucus Urine Present (A) None Seen /LPF    RBC Urine 4 (H) <=2 /HPF    WBC Urine 2 <=5 /HPF    Squamous Epithelials Urine 2 (H) <=1 /HPF    Narrative    Urine Culture not indicated

## 2023-09-04 NOTE — NURSING NOTE
Chief Complaint   Patient presents with    Urinary Problem    Vaginal Bleeding     Patient is tx with progesterone suppositories and oral estrogen for surrogate pregnancy and is concerned with possibility of irritation/yeast or pregnancy complications.    Initial /70 (BP Location: Left arm, Patient Position: Sitting, Cuff Size: Adult Regular)   Pulse 63   Temp 98.4  F (36.9  C) (Tympanic)   Resp 16   Ht 1.524 m (5')   Wt 64.9 kg (143 lb)   LMP 07/05/2023   SpO2 97%   BMI 27.93 kg/m   Estimated body mass index is 27.93 kg/m  as calculated from the following:    Height as of this encounter: 1.524 m (5').    Weight as of this encounter: 64.9 kg (143 lb).     Advance Care Directive on file? yes    FOOD SECURITY SCREENING QUESTIONS:    The next two questions are to help us understand your food security.  If you are feeling you need any assistance in this area, we have resources available to support you today.    Hunger Vital Signs:  Within the past 12 months we worried whether our food would run out before we got money to buy more. Never  Within the past 12 months the food we bought just didn't last and we didn't have money to get more. Never  Maritza Larsen LPN,LPN on 9/4/2023 at 4:15 PM      Maritza Larsen LPN

## 2023-09-06 LAB — BACTERIA UR CULT: NORMAL

## 2023-09-07 ENCOUNTER — LAB (OUTPATIENT)
Dept: LAB | Facility: OTHER | Age: 38
End: 2023-09-07
Attending: OBSTETRICS & GYNECOLOGY
Payer: COMMERCIAL

## 2023-09-07 DIAGNOSIS — Z31.9 UNSPECIFIED PROCREATIVE MANAGEMENT: ICD-10-CM

## 2023-09-07 LAB
ESTRADIOL SERPL-MCNC: 241 PG/ML
HCG INTACT+B SERPL-ACNC: ABNORMAL MIU/ML
PROGEST SERPL-MCNC: 13.2 NG/ML

## 2023-09-07 PROCEDURE — 82670 ASSAY OF TOTAL ESTRADIOL: CPT | Mod: ZL

## 2023-09-07 PROCEDURE — 84144 ASSAY OF PROGESTERONE: CPT | Mod: ZL

## 2023-09-07 PROCEDURE — 36415 COLL VENOUS BLD VENIPUNCTURE: CPT | Mod: ZL

## 2023-09-07 PROCEDURE — 84702 CHORIONIC GONADOTROPIN TEST: CPT | Mod: ZL

## 2023-09-14 ENCOUNTER — LAB (OUTPATIENT)
Dept: LAB | Facility: OTHER | Age: 38
End: 2023-09-14
Attending: OBSTETRICS & GYNECOLOGY
Payer: COMMERCIAL

## 2023-09-14 ENCOUNTER — HOSPITAL ENCOUNTER (OUTPATIENT)
Dept: ULTRASOUND IMAGING | Facility: OTHER | Age: 38
Discharge: HOME | End: 2023-09-14
Attending: OBSTETRICS & GYNECOLOGY
Payer: COMMERCIAL

## 2023-09-14 DIAGNOSIS — Z33.3 PREGNANT STATE, GESTATIONAL CARRIER: ICD-10-CM

## 2023-09-14 DIAGNOSIS — Z31.9 INFERTILITY MANAGEMENT: ICD-10-CM

## 2023-09-14 LAB
ESTRADIOL SERPL-MCNC: 626 PG/ML
HCG INTACT+B SERPL-ACNC: ABNORMAL MIU/ML
PROGEST SERPL-MCNC: 19.5 NG/ML

## 2023-09-14 PROCEDURE — 36415 COLL VENOUS BLD VENIPUNCTURE: CPT | Mod: ZL

## 2023-09-14 PROCEDURE — 82670 ASSAY OF TOTAL ESTRADIOL: CPT | Mod: ZL

## 2023-09-14 PROCEDURE — 84144 ASSAY OF PROGESTERONE: CPT | Mod: ZL

## 2023-09-14 PROCEDURE — 84702 CHORIONIC GONADOTROPIN TEST: CPT | Mod: ZL

## 2023-09-14 PROCEDURE — 76817 TRANSVAGINAL US OBSTETRIC: CPT

## 2023-09-21 ENCOUNTER — LAB (OUTPATIENT)
Dept: LAB | Facility: OTHER | Age: 38
End: 2023-09-21
Attending: OBSTETRICS & GYNECOLOGY
Payer: COMMERCIAL

## 2023-09-21 ENCOUNTER — HOSPITAL ENCOUNTER (OUTPATIENT)
Dept: ULTRASOUND IMAGING | Facility: OTHER | Age: 38
Discharge: HOME | End: 2023-09-21
Attending: OBSTETRICS & GYNECOLOGY
Payer: COMMERCIAL

## 2023-09-21 DIAGNOSIS — Z33.3 PREGNANT STATE, GESTATIONAL CARRIER: ICD-10-CM

## 2023-09-21 DIAGNOSIS — Z31.9 INFERTILITY MANAGEMENT: ICD-10-CM

## 2023-09-21 LAB
ESTRADIOL SERPL-MCNC: 1023 PG/ML
HCG INTACT+B SERPL-ACNC: ABNORMAL MIU/ML
PROGEST SERPL-MCNC: 12.3 NG/ML

## 2023-09-21 PROCEDURE — 84144 ASSAY OF PROGESTERONE: CPT | Mod: ZL

## 2023-09-21 PROCEDURE — 36415 COLL VENOUS BLD VENIPUNCTURE: CPT | Mod: ZL

## 2023-09-21 PROCEDURE — 84702 CHORIONIC GONADOTROPIN TEST: CPT | Mod: ZL

## 2023-09-21 PROCEDURE — 76817 TRANSVAGINAL US OBSTETRIC: CPT

## 2023-09-21 PROCEDURE — 82670 ASSAY OF TOTAL ESTRADIOL: CPT | Mod: ZL

## 2023-09-22 DIAGNOSIS — Z31.9 PROCREATIVE MANAGEMENT: Primary | ICD-10-CM

## 2023-09-28 ENCOUNTER — LAB (OUTPATIENT)
Dept: LAB | Facility: OTHER | Age: 38
End: 2023-09-28
Attending: OBSTETRICS & GYNECOLOGY
Payer: COMMERCIAL

## 2023-09-28 ENCOUNTER — HOSPITAL ENCOUNTER (OUTPATIENT)
Dept: ULTRASOUND IMAGING | Facility: OTHER | Age: 38
Discharge: HOME | End: 2023-09-28
Attending: OBSTETRICS & GYNECOLOGY
Payer: COMMERCIAL

## 2023-09-28 DIAGNOSIS — Z31.9 PROCREATIVE MANAGEMENT: ICD-10-CM

## 2023-09-28 LAB
ESTRADIOL SERPL-MCNC: 1604 PG/ML
PROGEST SERPL-MCNC: 19 NG/ML

## 2023-09-28 PROCEDURE — 76817 TRANSVAGINAL US OBSTETRIC: CPT

## 2023-09-28 PROCEDURE — 84144 ASSAY OF PROGESTERONE: CPT | Mod: ZL

## 2023-09-28 PROCEDURE — 82670 ASSAY OF TOTAL ESTRADIOL: CPT | Mod: ZL

## 2023-09-28 PROCEDURE — 36415 COLL VENOUS BLD VENIPUNCTURE: CPT | Mod: ZL

## 2023-09-29 ENCOUNTER — VIRTUAL VISIT (OUTPATIENT)
Dept: OBGYN | Facility: OTHER | Age: 38
End: 2023-09-29
Attending: OBSTETRICS & GYNECOLOGY
Payer: COMMERCIAL

## 2023-09-29 DIAGNOSIS — Z34.91 PRENATAL CARE IN FIRST TRIMESTER: Primary | ICD-10-CM

## 2023-09-29 PROCEDURE — 99207 PR OB VISIT-NO CHARGE - GICH ONLY: CPT

## 2023-09-29 ASSESSMENT — ANXIETY QUESTIONNAIRES
3. WORRYING TOO MUCH ABOUT DIFFERENT THINGS: NOT AT ALL
GAD7 TOTAL SCORE: 0
5. BEING SO RESTLESS THAT IT IS HARD TO SIT STILL: NOT AT ALL
7. FEELING AFRAID AS IF SOMETHING AWFUL MIGHT HAPPEN: NOT AT ALL
1. FEELING NERVOUS, ANXIOUS, OR ON EDGE: NOT AT ALL
GAD7 TOTAL SCORE: 0
6. BECOMING EASILY ANNOYED OR IRRITABLE: NOT AT ALL
2. NOT BEING ABLE TO STOP OR CONTROL WORRYING: NOT AT ALL

## 2023-09-29 ASSESSMENT — PATIENT HEALTH QUESTIONNAIRE - PHQ9
SUM OF ALL RESPONSES TO PHQ QUESTIONS 1-9: 0
5. POOR APPETITE OR OVEREATING: NOT AT ALL

## 2023-09-29 NOTE — CONFIDENTIAL NOTE
HPI:    This is a 38 year old female patient,  who presents today for OB Intake visit. Patient reports positive pregnancy test at home.     Obstetrical history and OB Questionnaire updated to the best of this nurse's ability based on patient report. PHQ-9 depression screening and routine Domestic Abuse screening completed. All immediate questions and concerns answered.    FOOD SECURITY SCREENING QUESTIONS:    The next two questions are to help us understand your food security.  If you are feeling you need any assistance in this area, we have resources available to support you today.    Hunger Vital Signs:  Within the past 12 months we worried whether our food would run out before we got money to buy more. Never  Within the past 12 months the food we bought just didn't last and we didn't have money to get more. Never    Last menstrual period is reported as Patient's last menstrual period was 2023. LAKSHMI based on LMP is Estimated Date of Delivery: May 3, 2024.  Her cycles are regular.  Her last menstrual period was normal.   Since her LMP, she has experienced  nausea, fatigue, loss of appetite, and urinary frequency.       OBSTETRIC HISTORY:    OB History    Para Term  AB Living   7 5 5 0 1 5   SAB IAB Ectopic Multiple Live Births   0 1 0 0 5      # Outcome Date GA Lbr Jovi/2nd Weight Sex Delivery Anes PTL Lv   7 Current            6 Term 20 38w1d   F    MT      Birth Comments: Surrogate   5 Term 16 38w0d   F  EPI N MT      Name: Clari   4 Term 14 38w0d   M CS-LTranv EPI N MT      Complications: Breech presentation      Name: Jerome   3 Term 10/28/11    M Vag-Spont EPI N MT      Name: Jim   2 Term 03    M Vag-Spont EPI Y MT      Name: Tripp   1 IAB                Age of first pregnancy: 18  Previous OB Provider: Dr. Angeles Henry  Previous Delivering Clinic: [facility]  Release of Records: None    Current delivery plan: [facility]  [place]  Preferred OB Provider: Dr. Angeles Henry  Current Primary Care Provider: None  Pediatrician: None needed    Additional History:     Have you travelled during the pregnancy?No  Have your sexual partner(s) travelled during the pregnancy?No      HISTORY:   Planned Pregnancy: Yes  Marital Status:   Occupation: [employer]  Living in Household: Spouse and Children    Pt is a surrogate  Past Medical History of Father of Baby: Unknown    Past History:  Her past medical history   Past Medical History:   Diagnosis Date     novel coronavirus disease (COVID-19) 2021    Personal history of other mental and behavioral disorders     No Comments Provided   .      Her past surgical history:   Past Surgical History:   Procedure Laterality Date    C/SECTION, LOW TRANSVERSE       SECTION      No Comments Provided    EXTRACTION(S) DENTAL      No Comments Provided       She has a history of  prior  section    Since her LMP she denies use of alcohol, tobacco and street drugs.    Pap smear history: Last 3 Pap Results:   PAP (no units)   Date Value   2019 NIL       STD/STI history: No STD history    STD/STI symptoms: None     Past medical, surgical, social and family history were reviewed and updated in EPIC.    Medications reviewed by this nurse. Current medication list:  Current Outpatient Medications   Medication Sig Dispense Refill    estradiol (ESTRACE) 2 MG tablet TAKE 2 TABLETS ORALLY TWICE DAILY.      Prenatal Vit-Fe Fumarate-FA (PNV PRENATAL PLUS MULTIVITAMIN) 27-1 MG TABS per tablet Take 1 tablet by mouth daily      progesterone (PROMETRIUM) 200 MG capsule Take one (1) tablet vaginally twice daily as directed      albuterol (PROAIR HFA/PROVENTIL HFA/VENTOLIN HFA) 108 (90 Base) MCG/ACT inhaler Inhale 2 puffs into the lungs every 4 hours as needed for shortness of breath / dyspnea or wheezing (Patient not taking: Reported on 2023) 18 g 0    triamcinolone (KENALOG) 0.1 % external  lotion Apply topically 3 times daily (Patient not taking: Reported on 2023) 60 mL 0     The following medications were recommended to be discontinued due to Pregnancy Category D status:   Patient informed to contact her primary care provider as soon as possible to discuss a safer alternative.    Influenza vaccine: Flu Vaccine GICH OB: Patient declined  COVID vaccine: COVID vaccine status GICH OB: Patient declined     Risk factors:  Moderate and moderately severe risks (consult with OB/Gyn)  Previous fetal or  demise: No  History of  delivery: Yes  History of heart disease Class I: No  Severe anemia, unresponsive to iron therapy: No  Pelvic mass or neoplasm: No  Previous : Yes  Hyper/hypothyroidism: No  History of postpartum hemorrhage requiring transfusion:No  History of Placenta Accreta: No    High Risk (Pregnancy managed by OB/Gyn)  Multiple pregnancy: No  Pre-gestational diabetes: No  Chronic Hypertension: No  Renal Failure: No  Heart disease, class II or greater: No  Rh Isoimmunization: No  Chronic active hepatitis: No  Convulsive disorder, poorly controlled: No  Isoimmune thrombocytopenia: No  Pre-term premature rupture of membranes: No  Lupus or other autoimmune disorder: No  Human Immunodeficiency Virus: No    HCG Qual Urine   Date Value Ref Range Status   2017 Negative Negative        ASSESSMENT/PLAN:     No diagnosis found.    38 year old , 9w0d of pregnancy with LAKSHMI of 5/3/2024, by Last Menstrual Period    Urine pregnancy test was completed today and results are noted above.    Per standing orders and scope of practice of this nurse, patient will have the following orders placed and completed prior to initial OB visit with the appropriate provider:    --early ultrasound for dating and viability ordered for 6+ weeks gestation based on LMP    --Quantitative Beta HCG and progesterone monitoring if indicated    Counseling given:     - Recommended weight gain for  pregnancy:    BMI < 18.5  28-40 lbs   18.5 - 24.9 25-35   25 - 29.9 15-25   > 30  < 15       PLAN/PATIENT INSTRUCTIONS:    Normal exercise.  Normal sexual activity.  Prenatal vitamins.  Anticipated weight gain.    follow-up appointment with Dr. Angeles Henry for pre-caitlyn care and take multivitamin or pre- vitamins    Luli Pandey RN.................................................. 2023 9:29 AM

## 2023-10-04 ENCOUNTER — PRENATAL OFFICE VISIT (OUTPATIENT)
Dept: OBGYN | Facility: OTHER | Age: 38
End: 2023-10-04
Attending: OBSTETRICS & GYNECOLOGY
Payer: COMMERCIAL

## 2023-10-04 VITALS
HEIGHT: 60 IN | DIASTOLIC BLOOD PRESSURE: 70 MMHG | WEIGHT: 146 LBS | HEART RATE: 68 BPM | BODY MASS INDEX: 28.66 KG/M2 | SYSTOLIC BLOOD PRESSURE: 110 MMHG

## 2023-10-04 DIAGNOSIS — Z33.3 PREGNANCY IN PERSON ACTING AS GESTATIONAL SURROGATE: Primary | ICD-10-CM

## 2023-10-04 PROCEDURE — 87086 URINE CULTURE/COLONY COUNT: CPT | Mod: ZL | Performed by: OBSTETRICS & GYNECOLOGY

## 2023-10-04 PROCEDURE — 99207 PR OB VISIT-NO CHARGE - GICH ONLY: CPT | Performed by: OBSTETRICS & GYNECOLOGY

## 2023-10-04 ASSESSMENT — PAIN SCALES - GENERAL: PAINLEVEL: NO PAIN (0)

## 2023-10-04 NOTE — PROGRESS NOTES
New Obstetrics Visit    HPI: 38 year old  at 9w4d  by IVF dating presenting for a new OB visit. This is a surrogate pregnancy, different couple this time. They live in [place]. They used a donor egg, age of donor was 29. Had a day 5 embryo transfer during a natural cycle on 23. She has been nauseated but managing. No cramping or VB.    OBHx  OB History    Para Term  AB Living   7 5 5 0 1 5   SAB IAB Ectopic Multiple Live Births   0 1 0 0 5      # Outcome Date GA Lbr Jovi/2nd Weight Sex Delivery Anes PTL Lv   7 Current            6 Term 20 38w1d   F    MT      Birth Comments: Surrogate   5 Term 16 39w0d  3.345 kg (7 lb 6 oz) F Vag-Spont EPI N MT      Name: Clari      Apgar1: 9  Apgar5: 9   4 Term 14 38w0d  2.92 kg (6 lb 7 oz) M CS-LTranv EPI N MT      Complications: Breech presentation      Name: Jerome   3 Term 10/28/11 38w0d  2.977 kg (6 lb 9 oz) M Vag-Spont EPI N MT      Name: Jim   2 Term 03 37w0d  3.033 kg (6 lb 11 oz) M Vag-Spont EPI Y MT      Name: sebastian jeronimo   1 IAB                  PMHx:   Past Medical History:   Diagnosis Date     novel coronavirus disease (COVID-19) 2021    Depressive disorder     Genital herpes     History of human papillomavirus infection     Personal history of other mental and behavioral disorders     No Comments Provided    Urinary tract infection       PSHx:   Past Surgical History:   Procedure Laterality Date    C/SECTION, LOW TRANSVERSE       SECTION      No Comments Provided    EXTRACTION(S) DENTAL      No Comments Provided      Meds:   Current Outpatient Medications   Medication    estradiol (ESTRACE) 2 MG tablet    Prenatal Vit-Fe Fumarate-FA (PNV PRENATAL PLUS MULTIVITAMIN) 27-1 MG TABS per tablet    progesterone (PROMETRIUM) 200 MG capsule    albuterol (PROAIR HFA/PROVENTIL HFA/VENTOLIN HFA) 108 (90 Base) MCG/ACT inhaler    triamcinolone (KENALOG) 0.1 % external lotion     No current  facility-administered medications for this visit.     Allergies:   No Known Allergies    SocHx:   Social History     Tobacco Use    Smoking status: Never    Smokeless tobacco: Never   Vaping Use    Vaping Use: Never used   Substance Use Topics    Alcohol use: Not Currently     Alcohol/week: 0.0 standard drinks of alcohol     Comment: occasional    Drug use: No     Lives with her  and 4 kids. Works at [employer]    FamHx:   Family History   Problem Relation Age of Onset    Diabetes Mother         Diabetes    Hypertension Mother         Hypertension    Thyroid Disease Mother         Thyroid Disease    Thyroid Disease Sister         hashimoto's (dx 2018)    Heart Disease Maternal Grandmother         Heart Disease    Hypertension Maternal Grandmother         Hypertension    Lung Cancer Maternal Grandmother         ? cervical cancer in 20s.     Heart Disease Maternal Grandfather     Prostate Cancer Maternal Grandfather         Cancer-prostate    Diabetes Maternal Grandfather         Diabetes    Hypertension Maternal Grandfather         Hypertension    Hypertension Maternal Aunt         Hypertension    Thyroid Disease Maternal Aunt         Thyroid Disease    Hypertension Paternal Aunt         Hypertension    Breast Cancer No family hx of         Cancer-breast    Colon Cancer No family hx of         Cancer-colon    Ovarian Cancer No family hx of         Cancer-ovarian    Other - See Comments No family hx of         Stroke    Blood Disease No family hx of         Blood Disease    Breast Cancer Maternal Aunt     Cancer Maternal Grandfather         ROS: 10-Point ROS negative except as noted in HPI      Physical Exam  /70 (BP Location: Right arm, Patient Position: Sitting, Cuff Size: Adult Regular)   Pulse 68   Ht 1.524 m (5')   Wt 66.2 kg (146 lb)   LMP 07/28/2023   BMI 28.51 kg/m    Body mass index is 28.51 kg/m .  Gen: Well-appearing, NAD  HEENT: Normocephalic, atraumatic  Neck: Thyroid is not enlarged,  no appreciable masses palpated. Non-tender  CV:  RRR, no m/r/g auscultated  Pulm: CTAB, no w/r/r auscultated  Abd: Soft, non-tender, non-distended  Ext: No LE edema, extremities warm and well perfused    Pelvic:  deferred    Assessment/Plan:  Ms. Nato Bradley is a 38 year old  at 9w4d  by IVF dating, here for new OB visit.   1. IVF, surrogate pregnancy  - plan level 2 US, fetal echo  - discussed starting ASA 81mg at 12 weeks  - continue progesterone until 12 weeks    2. Hx of c/s x1: plans  again, this time at [facility]  3. Hx of genital HSV: no outbreaks in years, plan acyclovir at 36 weeks    4. PNC: New OB Labs ord'd. Reports having GC/Chlam done through IVF clinic in August, will get records transferred  5. Genetics: desires- will draw in 2 weeks  6. Imaging: dating US at 7w0d  7. Immunizations: none    Follow up in 4 weeks.    Peri Henry MD  OB/GYN  10/4/2023 2:33 PM

## 2023-10-04 NOTE — NURSING NOTE
Chief Complaint   Patient presents with    Prenatal Care     OBPX Surrogate        Medication Reconciliation: complete    Offers no concerns.  Has some nausea but doing well     Belkis Manzo LPN........................10/4/2023  2:37 PM

## 2023-10-06 LAB — BACTERIA UR CULT: NORMAL

## 2023-10-17 LAB
ABO/RH(D): NORMAL
ANTIBODY SCREEN: NEGATIVE
SPECIMEN EXPIRATION DATE: NORMAL

## 2023-10-18 ENCOUNTER — LAB (OUTPATIENT)
Dept: LAB | Facility: OTHER | Age: 38
End: 2023-10-18
Attending: OBSTETRICS & GYNECOLOGY
Payer: COMMERCIAL

## 2023-10-18 DIAGNOSIS — Z33.3 PREGNANCY IN PERSON ACTING AS GESTATIONAL SURROGATE: ICD-10-CM

## 2023-10-18 LAB
BASO+EOS+MONOS # BLD AUTO: NORMAL 10*3/UL
BASO+EOS+MONOS NFR BLD AUTO: NORMAL %
BASOPHILS # BLD AUTO: 0 10E3/UL (ref 0–0.2)
BASOPHILS NFR BLD AUTO: 0 %
EOSINOPHIL # BLD AUTO: 0.3 10E3/UL (ref 0–0.7)
EOSINOPHIL NFR BLD AUTO: 5 %
ERYTHROCYTE [DISTWIDTH] IN BLOOD BY AUTOMATED COUNT: 12.2 % (ref 10–15)
HCT VFR BLD AUTO: 38.7 % (ref 35–47)
HGB BLD-MCNC: 13.1 G/DL (ref 11.7–15.7)
IMM GRANULOCYTES # BLD: 0 10E3/UL
IMM GRANULOCYTES NFR BLD: 0 %
LYMPHOCYTES # BLD AUTO: 1.6 10E3/UL (ref 0.8–5.3)
LYMPHOCYTES NFR BLD AUTO: 23 %
MCH RBC QN AUTO: 29.8 PG (ref 26.5–33)
MCHC RBC AUTO-ENTMCNC: 33.9 G/DL (ref 31.5–36.5)
MCV RBC AUTO: 88 FL (ref 78–100)
MONOCYTES # BLD AUTO: 0.3 10E3/UL (ref 0–1.3)
MONOCYTES NFR BLD AUTO: 4 %
NEUTROPHILS # BLD AUTO: 4.8 10E3/UL (ref 1.6–8.3)
NEUTROPHILS NFR BLD AUTO: 68 %
NRBC # BLD AUTO: 0 10E3/UL
NRBC BLD AUTO-RTO: 0 /100
PLATELET # BLD AUTO: 311 10E3/UL (ref 150–450)
RBC # BLD AUTO: 4.39 10E6/UL (ref 3.8–5.2)
WBC # BLD AUTO: 7.1 10E3/UL (ref 4–11)

## 2023-10-18 PROCEDURE — 85025 COMPLETE CBC W/AUTO DIFF WBC: CPT | Mod: ZL

## 2023-10-18 PROCEDURE — 86901 BLOOD TYPING SEROLOGIC RH(D): CPT | Mod: ZL

## 2023-10-18 PROCEDURE — 87389 HIV-1 AG W/HIV-1&-2 AB AG IA: CPT | Mod: ZL

## 2023-10-18 PROCEDURE — 36415 COLL VENOUS BLD VENIPUNCTURE: CPT | Mod: ZL

## 2023-10-18 PROCEDURE — 86803 HEPATITIS C AB TEST: CPT | Mod: ZL

## 2023-10-18 PROCEDURE — 86780 TREPONEMA PALLIDUM: CPT | Mod: ZL

## 2023-10-18 PROCEDURE — 86762 RUBELLA ANTIBODY: CPT | Mod: ZL

## 2023-10-18 PROCEDURE — 87340 HEPATITIS B SURFACE AG IA: CPT | Mod: ZL

## 2023-10-19 LAB
HBV SURFACE AG SERPL QL IA: NONREACTIVE
HCV AB SERPL QL IA: NONREACTIVE
HIV 1+2 AB+HIV1 P24 AG SERPL QL IA: NONREACTIVE
RUBV IGG SERPL QL IA: 1.96 INDEX
RUBV IGG SERPL QL IA: POSITIVE
T PALLIDUM AB SER QL: NONREACTIVE

## 2023-10-23 LAB — SCANNED LAB RESULT: NORMAL

## 2023-11-01 ENCOUNTER — PRENATAL OFFICE VISIT (OUTPATIENT)
Dept: OBGYN | Facility: OTHER | Age: 38
End: 2023-11-01
Attending: OBSTETRICS & GYNECOLOGY
Payer: COMMERCIAL

## 2023-11-01 VITALS
DIASTOLIC BLOOD PRESSURE: 64 MMHG | HEART RATE: 72 BPM | WEIGHT: 141.5 LBS | OXYGEN SATURATION: 98 % | BODY MASS INDEX: 27.63 KG/M2 | SYSTOLIC BLOOD PRESSURE: 118 MMHG

## 2023-11-01 DIAGNOSIS — Z33.3 PREGNANCY IN PERSON ACTING AS GESTATIONAL SURROGATE: Primary | ICD-10-CM

## 2023-11-01 PROCEDURE — 99207 PR OB VISIT-NO CHARGE - GICH ONLY: CPT | Performed by: OBSTETRICS & GYNECOLOGY

## 2023-11-01 ASSESSMENT — PAIN SCALES - GENERAL: PAINLEVEL: NO PAIN (0)

## 2023-11-01 NOTE — NURSING NOTE
No chief complaint on file.      Initial /64   Pulse 72   Wt 64.2 kg (141 lb 8 oz)   LMP 07/28/2023   SpO2 98%   BMI 27.63 kg/m   Estimated body mass index is 27.63 kg/m  as calculated from the following:    Height as of 10/4/23: 1.524 m (5').    Weight as of this encounter: 64.2 kg (141 lb 8 oz).  Medication Review: complete    The next two questions are to help us understand your food security.  If you are feeling you need any assistance in this area, we have resources available to support you today.           No data to display                  Health Care Directive:  Patient does not have a Health Care Directive or Living Will: Discussed advance care planning with patient; however, patient declined at this time.    Rut Rogers LPN

## 2023-11-01 NOTE — PROGRESS NOTES
Return OB Visit    S: Patient is feeling a little better. No cramping or VB.    O: /64   Pulse 72   Wt 64.2 kg (141 lb 8 oz)   LMP 2023   SpO2 98%   BMI 27.63 kg/m    Gen: Well-appearing, NAD  See OB Flowsheet    A/P:  Nato Bradley is a 38 year old  at 13w4d by IVF dating, here for return OB visit.   IVF, surrogate pregnancy  - plan level 2 US, fetal echo (ord'd- to be done in [place])  - discussed starting ASA 81mg now  - s/p progesterone     Hx of c/s x1: plans  again, this time at [facility]  Hx of genital HSV: no outbreaks in years, plan acyclovir at 36 weeks    PNC: Rh positive, Rubella immune  Genetics: normal aneuploidy screening  Imaging: dating US at 7w0d  Immunizations: none  RTC 6 weeks with MFM scan.     Peri Henry MD FACOG  OB/GYN  2023 9:19 AM

## 2023-11-28 ENCOUNTER — PRENATAL OFFICE VISIT (OUTPATIENT)
Dept: OBGYN | Facility: OTHER | Age: 38
End: 2023-11-28
Attending: OBSTETRICS & GYNECOLOGY
Payer: COMMERCIAL

## 2023-11-28 VITALS
SYSTOLIC BLOOD PRESSURE: 122 MMHG | HEART RATE: 72 BPM | DIASTOLIC BLOOD PRESSURE: 70 MMHG | WEIGHT: 141 LBS | BODY MASS INDEX: 27.54 KG/M2

## 2023-11-28 DIAGNOSIS — Z33.3 PREGNANCY IN PERSON ACTING AS GESTATIONAL SURROGATE: Primary | ICD-10-CM

## 2023-11-28 PROCEDURE — 99207 PR OB VISIT-NO CHARGE - GICH ONLY: CPT | Performed by: OBSTETRICS & GYNECOLOGY

## 2023-11-28 RX ORDER — KETOCONAZOLE 20 MG/ML
SHAMPOO TOPICAL DAILY PRN
COMMUNITY
Start: 2023-11-03

## 2023-11-28 ASSESSMENT — PAIN SCALES - GENERAL: PAINLEVEL: NO PAIN (0)

## 2023-11-28 NOTE — PROGRESS NOTES
Return OB Visit    S: Patient is feeling well. No cramping or VB. +FM    O: /70 (BP Location: Right arm, Patient Position: Sitting, Cuff Size: Adult Regular)   Pulse 72   Wt 64 kg (141 lb)   LMP 2023   BMI 27.54 kg/m    Gen: Well-appearing, NAD  See OB Flowsheet    A/P:  Nato Bradley is a 38 year old  at 17w3d by IVF dating, here for return OB visit.   IVF, surrogate pregnancy  - plan level 2 US, fetal echo (ord'd- to be done in [place])  - discussed starting ASA 81mg now  - s/p progesterone     Hx of c/s x1: plans  again, this time at [facility]  Hx of genital HSV: no outbreaks in years, plan acyclovir at 36 weeks     PNC: Rh positive, Rubella immune  Genetics: normal aneuploidy screening  Imaging: dating US at 7w0d  Immunizations: none  RTC 4-5 weeks, MFM US on     Peri Henry MD FACOG  OB/GYN  2023 8:35 AM

## 2023-11-28 NOTE — NURSING NOTE
Chief Complaint   Patient presents with    Prenatal Care     17w3d       Medication Reconciliation: complete  Pt presents to clinic today for prenatal care. Pt denies any bleeding, or leakage of fluid at this time. States baby is moving good. Patient denies contractions.       Belkis Manzo LPN........................11/28/2023  8:37 AM

## 2023-12-10 ENCOUNTER — MYC MEDICAL ADVICE (OUTPATIENT)
Dept: OBGYN | Facility: OTHER | Age: 38
End: 2023-12-10
Payer: COMMERCIAL

## 2023-12-13 ENCOUNTER — OFFICE VISIT (OUTPATIENT)
Dept: MATERNAL FETAL MEDICINE | Facility: CLINIC | Age: 38
End: 2023-12-13
Attending: OBSTETRICS & GYNECOLOGY
Payer: COMMERCIAL

## 2023-12-13 ENCOUNTER — HOSPITAL ENCOUNTER (OUTPATIENT)
Dept: ULTRASOUND IMAGING | Facility: CLINIC | Age: 38
Discharge: HOME | End: 2023-12-13
Attending: OBSTETRICS & GYNECOLOGY
Payer: COMMERCIAL

## 2023-12-13 ENCOUNTER — HOSPITAL ENCOUNTER (OUTPATIENT)
Dept: ULTRASOUND IMAGING | Facility: OTHER | Age: 38
Discharge: HOME | End: 2023-12-13
Attending: OBSTETRICS & GYNECOLOGY | Admitting: OBSTETRICS & GYNECOLOGY
Payer: COMMERCIAL

## 2023-12-13 DIAGNOSIS — Z33.3 PREGNANCY IN PERSON ACTING AS GESTATIONAL SURROGATE: ICD-10-CM

## 2023-12-13 DIAGNOSIS — O09.522 MULTIGRAVIDA OF ADVANCED MATERNAL AGE IN SECOND TRIMESTER: Primary | ICD-10-CM

## 2023-12-13 DIAGNOSIS — O09.812 PREGNANCY RESULTING FROM IN VITRO FERTILIZATION IN SECOND TRIMESTER: ICD-10-CM

## 2023-12-13 PROCEDURE — 76811 OB US DETAILED SNGL FETUS: CPT

## 2023-12-13 PROCEDURE — 76811 OB US DETAILED SNGL FETUS: CPT | Mod: 26 | Performed by: OBSTETRICS & GYNECOLOGY

## 2023-12-14 NOTE — PROGRESS NOTES
Type of service:    Telemedicine Office Visit for fetal ultrasound    Date of service:     Date: 12/13/23     Time service began:    10:00 AM  Time service ended:    11:00 AM    Reason:      Lack of available service in patient area    Description of basis or telemedicine appropriateness:     Consultation provided at the request of Dr. Henry for advice regarding the diagnosis and treatment of this patient's fetal ultrasound.  The patient's condition can be safely assessed via telemedicine.    The Mode of Transmission:    Secure interactive audio and visual telecommunication system (Video Guidance)    Location of originating and distant sites:      Originating site:   [place], [place]    Distant site:    [place], [place]    Sunshine Mai MD

## 2024-01-02 ENCOUNTER — PRENATAL OFFICE VISIT (OUTPATIENT)
Dept: OBGYN | Facility: OTHER | Age: 39
End: 2024-01-02
Attending: OBSTETRICS & GYNECOLOGY
Payer: COMMERCIAL

## 2024-01-02 VITALS
BODY MASS INDEX: 28.71 KG/M2 | WEIGHT: 147 LBS | DIASTOLIC BLOOD PRESSURE: 70 MMHG | SYSTOLIC BLOOD PRESSURE: 110 MMHG | HEART RATE: 80 BPM

## 2024-01-02 DIAGNOSIS — Z33.3 PREGNANCY IN PERSON ACTING AS GESTATIONAL SURROGATE: Primary | ICD-10-CM

## 2024-01-02 PROCEDURE — 99207 PR OB VISIT-NO CHARGE - GICH ONLY: CPT | Performed by: OBSTETRICS & GYNECOLOGY

## 2024-01-02 ASSESSMENT — PAIN SCALES - GENERAL: PAINLEVEL: NO PAIN (0)

## 2024-01-02 NOTE — PROGRESS NOTES
Return OB Visit    S: Patient is feeling tired but otherwise well. No cramping or VB. +FM    O: /70 (BP Location: Right arm, Patient Position: Sitting, Cuff Size: Adult Regular)   Pulse 80   Wt 66.7 kg (147 lb)   LMP 2023   BMI 28.71 kg/m    Gen: Well-appearing, NAD  See OB Flowsheet    A/P:  Nato Bradley is a 38 year old  at 22w3d by IVF dating, here for return OB visit.   IVF, surrogate pregnancy  - normal level 2 US  - fetal echo scheduled 24  - plan growth US at 32 weeks, weekly BPP starting at 36 weeks  - discussed starting ASA 81mg now  - s/p progesterone     Low-lying, anterior placenta overlying prior  section scar- plan follow up US at 32 weeks    Hx of c/s x1: plans  again, this time at [facility]  Hx of genital HSV: no outbreaks in years, plan acyclovir at 36 weeks     PNC: Rh positive, Rubella immune  Genetics: normal aneuploidy screening  Imaging: dating US at 7w0d  Immunizations: none  RTC 4 weeks    Peri Henry MD FACOG  OB/GYN  2024 8:33 AM

## 2024-01-02 NOTE — NURSING NOTE
Chief Complaint   Patient presents with    Prenatal Care     22w3d       Medication Reconciliation: complete  Pt presents to clinic today for prenatal care . Pt denies any bleeding, or leakage of fluid at this time. States baby is moving good. Patient denies contractions.       Belkis Manzo LPN........................1/2/2024  8:28 AM

## 2024-01-03 ENCOUNTER — TELEPHONE (OUTPATIENT)
Dept: OBGYN | Facility: OTHER | Age: 39
End: 2024-01-03
Payer: COMMERCIAL

## 2024-01-03 NOTE — TELEPHONE ENCOUNTER
Left message to call back  Belkis Manzo LPN........................1/3/2024  9:47 AM       Please let patient know that she needs to fill out a form for us to discuss care with parents of baby.  Bailey has the form at the desk.    Belkis Manzo LPN........................1/3/2024  9:47 AM     Patient had requested our  to let Dr. Peri Henry MD know that she would like her to call the parents of the baby to discuss prenatal care with them.      Amy: [phone] - Parent     Belkis Manzo LPN........................1/3/2024  9:49 AM

## 2024-01-05 ENCOUNTER — TELEPHONE (OUTPATIENT)
Dept: OBGYN | Facility: OTHER | Age: 39
End: 2024-01-05
Payer: COMMERCIAL

## 2024-01-05 NOTE — TELEPHONE ENCOUNTER
Patient has not filled out the release of information, is [place] on [place]. Unable to contact surrogate mother until this is complete.  Susan Tsang RN on 1/5/2024 at 3:12 PM

## 2024-01-05 NOTE — TELEPHONE ENCOUNTER
Nato had an appointment on Monday. Dr. Henry expressed concerns of possible gestational diabetes and placenta previa. Nato is a surrogate for Amy and spouse. Would like to speak with Dr. Henry at this point.    Libertad Pennington on 1/5/2024 at 2:44 PM

## 2024-01-10 NOTE — TELEPHONE ENCOUNTER
Amy called and has a few questions about the last ultrasound. Nato has signed a consent to communicate form so we are allowed to talk with Amy about pregnancy baby related stuff.  She said that 1/11/24 at 8:45 am would be a good time to call her.  Bailey Harris on 1/10/2024 at 3:41 PM

## 2024-01-11 NOTE — TELEPHONE ENCOUNTER
Called Amy regarding her concerns related to Nato's surrogate pregnancy. Answered questions regarding size of baby on anatomy survey and placental location, candidacy for  based on these findings. Reassured that this ultrasound is still early and there is time for baby's growth to normalize and the placenta to move locations. Already has follow-up scheduled for 32 weeks. Amy and her  Ace plan to attend this visit and will have the opportunity to do delivering planning based on those results. She was happy with this information.    Peri Henry MD FACOG  OB/GYN  2024 9:12 AM

## 2024-01-12 ENCOUNTER — TELEPHONE (OUTPATIENT)
Dept: OBGYN | Facility: OTHER | Age: 39
End: 2024-01-12

## 2024-01-12 ENCOUNTER — OFFICE VISIT (OUTPATIENT)
Dept: FAMILY MEDICINE | Facility: OTHER | Age: 39
End: 2024-01-12
Attending: STUDENT IN AN ORGANIZED HEALTH CARE EDUCATION/TRAINING PROGRAM
Payer: COMMERCIAL

## 2024-01-12 ENCOUNTER — HOSPITAL ENCOUNTER (OUTPATIENT)
Dept: GENERAL RADIOLOGY | Facility: OTHER | Age: 39
Discharge: HOME | End: 2024-01-12
Attending: STUDENT IN AN ORGANIZED HEALTH CARE EDUCATION/TRAINING PROGRAM
Payer: COMMERCIAL

## 2024-01-12 VITALS
WEIGHT: 147.13 LBS | HEIGHT: 60 IN | SYSTOLIC BLOOD PRESSURE: 114 MMHG | TEMPERATURE: 98.5 F | BODY MASS INDEX: 28.89 KG/M2 | OXYGEN SATURATION: 99 % | RESPIRATION RATE: 16 BRPM | HEART RATE: 75 BPM | DIASTOLIC BLOOD PRESSURE: 66 MMHG

## 2024-01-12 DIAGNOSIS — R06.2 WHEEZE: Primary | ICD-10-CM

## 2024-01-12 DIAGNOSIS — R06.2 WHEEZE: ICD-10-CM

## 2024-01-12 DIAGNOSIS — Z33.3 PREGNANT STATE, GESTATIONAL CARRIER: ICD-10-CM

## 2024-01-12 PROCEDURE — 99213 OFFICE O/P EST LOW 20 MIN: CPT | Performed by: STUDENT IN AN ORGANIZED HEALTH CARE EDUCATION/TRAINING PROGRAM

## 2024-01-12 PROCEDURE — 71046 X-RAY EXAM CHEST 2 VIEWS: CPT

## 2024-01-12 ASSESSMENT — PAIN SCALES - GENERAL: PAINLEVEL: NO PAIN (0)

## 2024-01-12 ASSESSMENT — ENCOUNTER SYMPTOMS: WHEEZING: 1

## 2024-01-12 NOTE — PROGRESS NOTES
Assessment & Plan   Problem List Items Addressed This Visit    None  Visit Diagnoses       Wheeze    -  Primary    Relevant Orders    XR Chest 2 Views (Completed)    Pregnant state, gestational carrier               Given the odd sound of inspiration and non typical wheeze sound, we discussed benefits vs risks of CXR while pregnant. She is in agreement to continue with xray to further assess. Initial concern for foreign body.   Normal CXR read, will trial albuterol PRN and see if this is more of a wheeze presentation from prior covid illness/lung injury              BMI:   Estimated body mass index is 28.73 kg/m  as calculated from the following:    Height as of this encounter: 1.524 m (5').    Weight as of this encounter: 66.7 kg (147 lb 2 oz).           No follow-ups on file.    Alexandrea Cheung MD  [facility]    Subjective   Nato is a 39 year old, presenting for the following health issues:  Wheezing (Right side, laying down shortness of breath x 5 days)      Wheezing    History of Present Illness       Reason for visit:  Right lung squeaking  Symptom onset:  1-3 days ago  Symptoms include:  Deep breath lung/chest squeak on occasion  Symptom intensity:  Moderate  Symptom progression:  Worsening  Had these symptoms before:  No  What makes it worse:  Laying on my right side or being outside in cold air    She eats 2-3 servings of fruits and vegetables daily.She consumes 0 sweetened beverage(s) daily.She exercises with enough effort to increase her heart rate 20 to 29 minutes per day.  She exercises with enough effort to increase her heart rate 3 or less days per week.   She is taking medications regularly.       - squeaking lung  - right side, felt like snoring but in chest   - has a history of pneumonia and does not feel similar or sick  - random at times squeak  - people in the room can hear it   - no aspiration or choking   - last illness October  - no recent cold symptoms   - has  "albuterol from when she had covid, history of long haul covid  - has not used inhaler               Review of Systems   Respiratory:  Positive for wheezing.       Constitutional, HEENT, cardiovascular, pulmonary, gi and gu systems are negative, except as otherwise noted.      Objective    /66 (BP Location: Right arm, Patient Position: Sitting, Cuff Size: Adult Regular)   Pulse 75   Temp 98.5  F (36.9  C) (Tympanic)   Resp 16   Ht 1.524 m (5')   Wt 66.7 kg (147 lb 2 oz)   LMP 07/28/2023   SpO2 99%   BMI 28.73 kg/m    Body mass index is 28.73 kg/m .  Physical Exam   GENERAL: healthy, alert and no distress  EYES: Eyes grossly normal to inspection, PERRL and conjunctivae and sclerae normal  HENT: ear canals and TM's normal, nose and mouth without ulcers or lesions  RESP: audible inspiratory \"squeak\" with each breath when in the exam room, can also auscultate the \"squeak\" right posterior lower lobe. All other lung fields normal   CV: regular rate and rhythm, normal S1 S2, no S3 or S4, no murmur, click or rub, no peripheral edema and peripheral pulses strong  ABDOMEN: gravid    XR Chest 2 Views    Result Date: 1/12/2024  PROCEDURE: XR CHEST 2 VIEWS 1/12/2024 9:45 AM HISTORY: right lower lung posterior squeak with inspiration x 5 days; Wheeze COMPARISONS: 4/15/2020. TECHNIQUE: 2 views. FINDINGS: Heart and pulmonary vasculature are normal. Lungs are clear and no pleural effusion is seen.        IMPRESSION: No acute disease. DONNY PUENTE MD   SYSTEM ID:  [id number]                   "

## 2024-01-12 NOTE — PROGRESS NOTES
Spoke with pt. explained risks and steps we will take to reduce radiation. Pt agreed to continue with exam.     Kwesi

## 2024-01-12 NOTE — NURSING NOTE
Medication Reconciliation: Complete    Flori Abernathy LPN  1/12/2024 9:14 AM  Chief Complaint   Patient presents with    Wheezing     Right side, laying down shortness of breath x 5 days       Initial /66 (BP Location: Right arm, Patient Position: Sitting, Cuff Size: Adult Regular)   Pulse 75   Temp 98.5  F (36.9  C) (Tympanic)   Resp 16   Ht 1.524 m (5')   Wt 66.7 kg (147 lb 2 oz)   LMP 07/28/2023   SpO2 99%   BMI 28.73 kg/m   Estimated body mass index is 28.73 kg/m  as calculated from the following:    Height as of this encounter: 1.524 m (5').    Weight as of this encounter: 66.7 kg (147 lb 2 oz).  Medication Review: complete    The next two questions are to help us understand your food security.  If you are feeling you need any assistance in this area, we have resources available to support you today.          1/12/2024   SDOH- Food Insecurity   Within the past 12 months, did you worry that your food would run out before you got money to buy more? N   Within the past 12 months, did the food you bought just not last and you didn t have money to get more? N         Health Care Directive:  Patient does not have a Health Care Directive or Living Will: Discussed advance care planning with patient; however, patient declined at this time.    Flori Abernathy LPN

## 2024-01-12 NOTE — TELEPHONE ENCOUNTER
Nato has a couple of questions for Dr. Henry about her breathing.  She was seen today in family practice, but would like to talk with Dr. Henry.  Bailey Harris on 1/12/2024 at 10:29 AM

## 2024-01-18 ENCOUNTER — LAB (OUTPATIENT)
Dept: LAB | Facility: OTHER | Age: 39
End: 2024-01-18
Attending: OBSTETRICS & GYNECOLOGY
Payer: COMMERCIAL

## 2024-01-18 DIAGNOSIS — Z33.3 PREGNANCY IN PERSON ACTING AS GESTATIONAL SURROGATE: ICD-10-CM

## 2024-01-18 LAB
ERYTHROCYTE [DISTWIDTH] IN BLOOD BY AUTOMATED COUNT: 12.6 % (ref 10–15)
GLUCOSE 1H P 50 G GLC PO SERPL-MCNC: 144 MG/DL (ref 70–129)
HCT VFR BLD AUTO: 34.7 % (ref 35–47)
HGB BLD-MCNC: 12 G/DL (ref 11.7–15.7)
MCH RBC QN AUTO: 30 PG (ref 26.5–33)
MCHC RBC AUTO-ENTMCNC: 34.6 G/DL (ref 31.5–36.5)
MCV RBC AUTO: 87 FL (ref 78–100)
PLATELET # BLD AUTO: 326 10E3/UL (ref 150–450)
RBC # BLD AUTO: 4 10E6/UL (ref 3.8–5.2)
WBC # BLD AUTO: 9.5 10E3/UL (ref 4–11)

## 2024-01-18 PROCEDURE — 82950 GLUCOSE TEST: CPT | Mod: ZL

## 2024-01-18 PROCEDURE — 86780 TREPONEMA PALLIDUM: CPT | Mod: ZL

## 2024-01-18 PROCEDURE — 36415 COLL VENOUS BLD VENIPUNCTURE: CPT | Mod: ZL

## 2024-01-18 PROCEDURE — 85027 COMPLETE CBC AUTOMATED: CPT | Mod: ZL

## 2024-01-19 LAB — T PALLIDUM AB SER QL: NONREACTIVE

## 2024-01-26 ENCOUNTER — PRENATAL OFFICE VISIT (OUTPATIENT)
Dept: OBGYN | Facility: OTHER | Age: 39
End: 2024-01-26
Attending: OBSTETRICS & GYNECOLOGY
Payer: COMMERCIAL

## 2024-01-26 ENCOUNTER — LAB (OUTPATIENT)
Dept: LAB | Facility: OTHER | Age: 39
End: 2024-01-26
Attending: OBSTETRICS & GYNECOLOGY
Payer: COMMERCIAL

## 2024-01-26 VITALS
BODY MASS INDEX: 28.32 KG/M2 | WEIGHT: 145 LBS | HEART RATE: 72 BPM | DIASTOLIC BLOOD PRESSURE: 80 MMHG | SYSTOLIC BLOOD PRESSURE: 122 MMHG

## 2024-01-26 DIAGNOSIS — Z33.3 PREGNANCY IN PERSON ACTING AS GESTATIONAL SURROGATE: ICD-10-CM

## 2024-01-26 DIAGNOSIS — Z33.3 PREGNANCY IN PERSON ACTING AS GESTATIONAL SURROGATE: Primary | ICD-10-CM

## 2024-01-26 LAB
GESTATIONAL GTT 1 HR POST DOSE: 177 MG/DL (ref 60–179)
GESTATIONAL GTT 2 HR POST DOSE: 120 MG/DL (ref 60–154)
GESTATIONAL GTT 3 HR POST DOSE: 97 MG/DL (ref 60–139)
GLUCOSE P FAST SERPL-MCNC: 90 MG/DL (ref 60–94)

## 2024-01-26 PROCEDURE — 82947 ASSAY GLUCOSE BLOOD QUANT: CPT | Mod: ZL

## 2024-01-26 PROCEDURE — 36415 COLL VENOUS BLD VENIPUNCTURE: CPT | Mod: ZL

## 2024-01-26 PROCEDURE — 99207 PR OB VISIT-NO CHARGE - GICH ONLY: CPT | Performed by: OBSTETRICS & GYNECOLOGY

## 2024-01-26 PROCEDURE — 82950 GLUCOSE TEST: CPT | Mod: ZL

## 2024-01-26 PROCEDURE — 82951 GLUCOSE TOLERANCE TEST (GTT): CPT | Mod: ZL

## 2024-01-26 ASSESSMENT — PAIN SCALES - GENERAL: PAINLEVEL: NO PAIN (0)

## 2024-01-26 NOTE — NURSING NOTE
Chief Complaint   Patient presents with    Prenatal Care     25w6d       Medication Reconciliation: complete    Pt presents to clinic today for prenatal care . Pt denies any bleeding, or leakage of fluid at this time. States baby is moving good. Patient denies contractions.     Belkis Manzo LPN........................1/26/2024  8:30 AM

## 2024-01-26 NOTE — PROGRESS NOTES
Return OB Visit    S: Patient is feeling well. No ctx, VB or LOF. +FM    O: /80 (BP Location: Right arm, Patient Position: Sitting, Cuff Size: Adult Large)   Pulse 72   Wt 65.8 kg (145 lb)   LMP 2023   BMI 28.32 kg/m    Gen: Well-appearing, NAD  See OB Flowsheet    A/P:  Nato Bradley is a 39 year old  at 25w6d by IVF dating, here for return OB visit.   IVF, surrogate pregnancy  - normal level 2 US  - fetal echo scheduled 24  - plan growth US at 32 weeks, weekly BPP starting at 36 weeks  - discussed starting ASA 81mg now  - s/p progesterone     Low-lying, anterior placenta overlying prior  section scar- plan follow up US at 32 weeks     Hx of c/s x1: plans  again, this time at [facility]  Hx of genital HSV: no outbreaks in years, plan acyclovir at 36 weeks     PNC: Rh positive, Rubella immune, - GTT today  Genetics: normal aneuploidy screening  Imaging: dating US at 7w0d  Immunizations: none  RTC 4 weeks    Peri Henry MD FACOG  OB/GYN  2024 8:47 AM

## 2024-02-22 ENCOUNTER — PRENATAL OFFICE VISIT (OUTPATIENT)
Dept: OBGYN | Facility: OTHER | Age: 39
End: 2024-02-22
Attending: OBSTETRICS & GYNECOLOGY
Payer: COMMERCIAL

## 2024-02-22 VITALS
HEART RATE: 72 BPM | WEIGHT: 147 LBS | DIASTOLIC BLOOD PRESSURE: 60 MMHG | BODY MASS INDEX: 28.71 KG/M2 | SYSTOLIC BLOOD PRESSURE: 102 MMHG

## 2024-02-22 DIAGNOSIS — Z33.3 PREGNANCY IN PERSON ACTING AS GESTATIONAL SURROGATE: Primary | ICD-10-CM

## 2024-02-22 PROCEDURE — 99207 PR OB VISIT-NO CHARGE - GICH ONLY: CPT | Performed by: OBSTETRICS & GYNECOLOGY

## 2024-02-22 ASSESSMENT — PAIN SCALES - GENERAL: PAINLEVEL: NO PAIN (0)

## 2024-02-22 NOTE — PROGRESS NOTES
Return OB Visit    S: Patient is uncomfortable but well. No regular ctx, VB or LOF. +FM.    O: /60 (BP Location: Right arm, Patient Position: Sitting, Cuff Size: Adult Regular)   Pulse 72   Wt 66.7 kg (147 lb)   LMP 2023   BMI 28.71 kg/m    Gen: Well-appearing, NAD  See OB Flowsheet    A/P:  Nato Bradley is a 39 year old  at 29w5d by IVF dating, here for return OB visit.   IVF, surrogate pregnancy  - normal level 2 US  - fetal echo rescheduled for 3/4  - plan growth US at 32 weeks, weekly BPP starting at 36 weeks  - discussed starting ASA 81mg now  - s/p progesterone     Low-lying, anterior placenta overlying prior  section scar- plan follow up US at 32 weeks     Hx of c/s x1: plans  again, was planning at [facility] but now unsure  Hx of genital HSV: no outbreaks in years, plan acyclovir at 36 weeks     PNC: Rh positive, Rubella immune, , passed GTT  Genetics: normal aneuploidy screening  Imaging: dating US at 7w0d  Immunizations: none. Declines Tdap  RTC 3 weeks with follow-up US, intended parents plan to be at this visit to discuss birth plan    Peri Henry MD FACOG  OB/GYN  2024 8:37 AM

## 2024-02-22 NOTE — NURSING NOTE
Chief Complaint   Patient presents with    Prenatal Care     29w5d       Medication Reconciliation: complete  Pt presents to clinic today for prenatal care. Pt denies any bleeding, or leakage of fluid at this time. States baby is moving good. Patient denies any regular contractions.       Belkis Manzo LPN........................2/22/2024  8:27 AM

## 2024-03-04 ENCOUNTER — OFFICE VISIT (OUTPATIENT)
Dept: CARDIOLOGY | Facility: CLINIC | Age: 39
End: 2024-03-04
Payer: COMMERCIAL

## 2024-03-04 ENCOUNTER — HOSPITAL ENCOUNTER (OUTPATIENT)
Dept: CARDIOLOGY | Facility: OTHER | Age: 39
Discharge: HOME | End: 2024-03-04
Attending: OBSTETRICS & GYNECOLOGY | Admitting: OBSTETRICS & GYNECOLOGY
Payer: COMMERCIAL

## 2024-03-04 DIAGNOSIS — O35.BXX0 FETAL CARDIAC DISEASE AFFECTING PREGNANCY, SINGLE OR UNSPECIFIED FETUS: Primary | ICD-10-CM

## 2024-03-04 DIAGNOSIS — Z33.3 PREGNANCY IN PERSON ACTING AS GESTATIONAL SURROGATE: ICD-10-CM

## 2024-03-04 PROCEDURE — 93325 DOPPLER ECHO COLOR FLOW MAPG: CPT

## 2024-03-04 PROCEDURE — 93325 DOPPLER ECHO COLOR FLOW MAPG: CPT | Mod: 26 | Performed by: PEDIATRICS

## 2024-03-04 PROCEDURE — 76827 ECHO EXAM OF FETAL HEART: CPT | Mod: 26 | Performed by: PEDIATRICS

## 2024-03-04 PROCEDURE — 99202 OFFICE O/P NEW SF 15 MIN: CPT | Mod: 25 | Performed by: PEDIATRICS

## 2024-03-04 PROCEDURE — 76825 ECHO EXAM OF FETAL HEART: CPT | Mod: 26 | Performed by: PEDIATRICS

## 2024-03-04 NOTE — PROGRESS NOTES
[facility], [facility]   Heart Center Fetal Consult Note    Patient:  Nato Bradley MRN:  [id number]   YOB: 1985 Age:  39 year old   Date of Visit:  3/4/2024 PCP:  No Ref-Primary, Physician     Dear Doctor,     I had the pleasure of seeing Nato Bradley at the [facility] on 3/4/2024 in fetal cardiology consultation for fetal echocardiogram results. As you know, she is a 39 year old  at 31w2d who presented for fetal echocardiogram today because of IVF pregnancy; gestational carrier.    I performed and interpreted the fetal echocardiogram today, which demonstrated normal fetal cardiac anatomy. Normal fetal intracardiac connections. Normal right and left ventricular size and function. No pericardial effusion.     I reviewed the echo findings today with Nato Bradley. She is aware that the study was within normal limits with no major cardiac abnormalities. She is aware of the general limitations of fetal echocardiography. No additional fetal echocardiograms are recommended. No  cardiac follow-up is required.     Thank you for allowing me to participate in Nato's care. Please do not hesitate to contact me with questions or concerns.    This visit was separate from the performance and interpretation of the ultrasound. The majority of the time (>50%) was spent in counseling and coordination of care. I spent approximately 15 minutes in face-to-face time reviewing the above considerations.    Miguel Ángel Aguilar M.D.  Pediatric Cardiology  [address], 5th floor, [facility] [address]  Phone [phone]  Fax [phone]

## 2024-03-14 ENCOUNTER — HOSPITAL ENCOUNTER (OUTPATIENT)
Dept: ULTRASOUND IMAGING | Facility: OTHER | Age: 39
Discharge: HOME | End: 2024-03-14
Attending: OBSTETRICS & GYNECOLOGY
Payer: COMMERCIAL

## 2024-03-14 ENCOUNTER — PRENATAL OFFICE VISIT (OUTPATIENT)
Dept: OBGYN | Facility: OTHER | Age: 39
End: 2024-03-14
Attending: OBSTETRICS & GYNECOLOGY
Payer: COMMERCIAL

## 2024-03-14 VITALS
RESPIRATION RATE: 15 BRPM | HEART RATE: 72 BPM | DIASTOLIC BLOOD PRESSURE: 74 MMHG | TEMPERATURE: 98.4 F | HEIGHT: 60 IN | WEIGHT: 153 LBS | SYSTOLIC BLOOD PRESSURE: 122 MMHG | OXYGEN SATURATION: 98 % | BODY MASS INDEX: 30.04 KG/M2

## 2024-03-14 DIAGNOSIS — Z33.3 PREGNANCY IN PERSON ACTING AS GESTATIONAL SURROGATE: ICD-10-CM

## 2024-03-14 DIAGNOSIS — Z33.3 PREGNANCY IN PERSON ACTING AS GESTATIONAL SURROGATE: Primary | ICD-10-CM

## 2024-03-14 PROCEDURE — 99207 PR OB VISIT-NO CHARGE - GICH ONLY: CPT | Performed by: OBSTETRICS & GYNECOLOGY

## 2024-03-14 PROCEDURE — 76816 OB US FOLLOW-UP PER FETUS: CPT

## 2024-03-14 ASSESSMENT — PAIN SCALES - GENERAL: PAINLEVEL: NO PAIN (0)

## 2024-03-14 NOTE — NURSING NOTE
Chief Complaint   Patient presents with    Prenatal Care     Pt presents to clinic today for prenatal care 32w5d. Pt denies any bleeding, or leakage of fluid at this time. States baby is moving good. Patient denies contractions.    Initial /74   Pulse 72   Temp 98.4  F (36.9  C) (Tympanic)   Resp 15   Ht 1.524 m (5')   Wt 69.4 kg (153 lb)   LMP 07/28/2023   SpO2 98%   BMI 29.88 kg/m   Estimated body mass index is 29.88 kg/m  as calculated from the following:    Height as of this encounter: 1.524 m (5').    Weight as of this encounter: 69.4 kg (153 lb).  Medication Reconciliation: uriel Zaman LPN on 3/14/2024 at 10:11 AM

## 2024-03-14 NOTE — PROGRESS NOTES
Return OB Visit    S: Patient is feeling uncomfortable but well. BH ctx, no VB or LOF. +FM.    Here today with the intended parents, Amy and Ace.     O: /74   Pulse 72   Temp 98.4  F (36.9  C) (Tympanic)   Resp 15   Ht 1.524 m (5')   Wt 69.4 kg (153 lb)   LMP 2023   SpO2 98%   BMI 29.88 kg/m    Gen: Well-appearing, NAD  See OB Flowsheet    A/P:  Nato Bradley is a 39 year old  at 32w5d by IVF dating, here for return OB visit.   IVF, surrogate pregnancy  - normal level 2 US  - fetal echo normal  - Growth US 3/14/2024 EFW  2677g (>98%ile). Repeat in 4 weeks  - weekly BPP starting at 36 weeks  - reviewed extensive birth plan 3/14/2024, intended parents plan to revise based on today's conversation and send update     Low-lying, anterior placenta overlying prior  section scar: resolved at 32 weeks     Hx of c/s x1: plans  again, was planning at [facility] but now unsure. Will refer to care coordination to start getting legal paperwork completed in case she delivers here.    Hx of genital HSV: no outbreaks in years, plan acyclovir at 36 weeks     PNC: Rh positive, Rubella immune, , passed GTT  Genetics: normal aneuploidy screening  Imaging: dating US at 7w0d  Immunizations: none. Declines Tdap  RTC 2 weeks    Peri Henry MD FACOG  OB/GYN  3/14/2024 11:45 AM

## 2024-03-19 ENCOUNTER — PATIENT OUTREACH (OUTPATIENT)
Dept: CARE COORDINATION | Facility: CLINIC | Age: 39
End: 2024-03-19
Payer: COMMERCIAL

## 2024-03-19 ENCOUNTER — TELEPHONE (OUTPATIENT)
Dept: OBGYN | Facility: OTHER | Age: 39
End: 2024-03-19
Payer: COMMERCIAL

## 2024-03-19 NOTE — TELEPHONE ENCOUNTER
This patient is a surrogate for Amy. Amy has questions that she would like answered by Angeles Henry. That is all I got from this person. She also wanted to be transferred to a . I do not know what that was about at all.       Dori Pascual on 3/19/2024 at 9:30 AM

## 2024-03-19 NOTE — PROGRESS NOTES
"Clinic Care Coordination Contact    Referral to Care Coordination from Dr. FADIA Henry to assist with surrogacy arrangement.    Patient is currently in a surrogate assignment with intended birth parents from [place].    Spoke with patient today and discussed concerns re: some changes being made from initial \"birth plan\" and how this is affecting the intended parent/motherAmy. Offered active listening to patient and agreed to assist patient and Care team--including MD, Dr. FADIA Henry/care team; Surrogacy Care Coordinator/Sara; and intended parents--including parent, Amy--- in resolving concerns an d offering support as needed. Patient agreed to Enrollment in Care Coordination.     Spoke at length via separate phone conversations with both patient--and the Care Coordinator at [facility], aSra re: some specific concerns raised at recent MD appt with all parties as stated below.     Intended motherAmy, is wanting [facility] to allow videoagraphy at delivery of baby and that is currently not policy.  Also a concern is who to be present in case of the need for an emergency procedure--if one should occur. There is a limit of people in delivery room which raises concerns as to who that should be.  Also, patient has changed the initial birth plan/location to be here at [facility] despite initial plan to be [facility] and this is disconcerting to intended parent at this time.      Intended parent, Amy, has reached out and left voicemail with writer. Dr. FADIA Henry was not available today to discuss case.   Will discuss with MD tomorrow/earliest opportunity and then responded to call initiated/message left by intended motherAmy.     Will also continue to discuss and assist with legal process/paperwork as initiated by  Sara/surrogacy agency. Necessary legal paperwork is currently being revised and will be available soon.  Writer will initiate referral to [facility] Risk Management " officer to make sure all is in alignment with legalities for benefit of all parties involved.     Plan: Assist in Care Coordination with patient and team involved in surrogate arrangement.  Offer active listening, necessary referrals, reframing, and encouragement/validation as needed.     DERIAN CARNES on 3/19/2024 at 5:44 PM    Clinic Care Coordination Contact    Spoke with patient, as well with Sara/surrogacy agent, and also intended parent, Amy Dane re: the revisions to the initial birth plan.      Writer also spoke with [place] Risk Management RN/Counsultant, Donal Pitts re: patient's status and surrogacy plan/revisions in birth plan.     Discussed pending delivery with intended parent, Amy, and some of her expectations and requests.  Discussed videography--writer shared that this is against policy at this time, but photography remains an option.  Amy shared her disappointment with this decision but seemed to respond positively to redirection towards focus on importance of relationship/interaction with their baby immediately after delivery.      Writer also shared with Amy/intended parents that there may be limits to people in the room at varying times of delivery to promote safety for surrogate/patient and baby during and after the delivery/birth. Writer also stressed that the MD and professional staff involved need space to perform their duties safely and effectively.      Amy/intended mother stated she is  wondering about the following: can she and her  stay at the hospital after delivery?  How to accommodate for the needed breast milk/storing breast milk, etc.? How long to have baby stay in the area/see pediatrician for follow up appointment, etc.? Writer agreed to follow up with RN Manager of Women's Health and Birth, Carolyne Reagan, next business day and get back to her on these questions.      Writer also made a referral to Gaby Whitley/[facility], for follow up on  plan/concerns.  Amy/intended mother expressed reassurance and gratitude for this.       Writer offered validation, encouragement and reframing for both patient and intended parent.  Despite their concerns, it appears that they are both remain committed to healthy and positive outcomes for all involved.     Writer conveyed this to Sara, surrogacy agent, via a message as well.      Plan: Remain available to all parties involved.  Continue to assess and assist with concerns; offer active listening, problem solving, reframing and encouragement to all parties to promote positive, safe outcomes.     DERIAN CARNES on 3/22/2024 at 5:21 PM    Clinic Care Coordination Contact    Spoke to [facility] RN Manager, Carolyne Reagan, today.  Writer reviewed questions that intended mother, Amy, had asked in last conversation.     Carolyne/RN shared information re: Unit guidelines and services--ie. Tele NICU, # of people in delivery room, etc. Writer then called and  communicated this to intended birth parent, Amy, upon request.     Talked with Sara at Surrogacy agency.  Birth plan has been revised again and presented to surrogate/patient and this will be reviewed with MD, Dr. Henry, at upcoming appointment this week.     Intended mother, Amy, is wanting writer to communicate a request that MD, Dr. FADIA Henry, call her after patient/baby's appointments for updates until delivery.  Writer will follow up with this request.     Writer directed intended mother/Amy to watch video on [employer] website of virtual tour of B unit.  She was excited to do so as she was unable to tour unit when she was here for appt.     Plan: Continue to being available to assess and assist with surrogacy plan as needed. Offering active listening, encouragement, support, reframing as needed.     DERIAN CARNES on 3/26/2024 at 5:59 PM

## 2024-03-20 NOTE — TELEPHONE ENCOUNTER
There is consent to communicate with Amy Vela for OB only. This is ongoing with social work.  Gale Little RN on 3/20/2024 at 3:18 PM

## 2024-03-21 NOTE — TELEPHONE ENCOUNTER
Amy also wanted us to be aware that she has made contact with the local pediatrician and baby will be seen as soon as they return home.  Bailey Harris on 3/21/2024 at 4:25 PM

## 2024-03-21 NOTE — TELEPHONE ENCOUNTER
Amy, intended mom, has a couple of questions for Dr. Henry.  She is trying to plan their stay here and get needed supplies together for a temporary nursery:    Once baby is discharged from the hospital, he needs a 2 day follow up visit, would that be at [facility]?  After baby has his 2 day follow up visit, are they cleared to go home to [place]?   How many days should they plan to be here after baby's birth if all goes as planned?    She also wanted Dr. Henry to know that they will be traveling by private jet and their flight time will be 1 1/2 hours.    Bailey Harris on 3/21/2024 at 4:06 PM

## 2024-03-28 ENCOUNTER — PRENATAL OFFICE VISIT (OUTPATIENT)
Dept: OBGYN | Facility: OTHER | Age: 39
End: 2024-03-28
Attending: OBSTETRICS & GYNECOLOGY
Payer: COMMERCIAL

## 2024-03-28 VITALS
WEIGHT: 149.8 LBS | DIASTOLIC BLOOD PRESSURE: 84 MMHG | OXYGEN SATURATION: 99 % | HEART RATE: 78 BPM | SYSTOLIC BLOOD PRESSURE: 136 MMHG | BODY MASS INDEX: 29.26 KG/M2

## 2024-03-28 DIAGNOSIS — Z33.3 PREGNANCY IN PERSON ACTING AS GESTATIONAL SURROGATE: Primary | ICD-10-CM

## 2024-03-28 PROCEDURE — 99207 PR OB VISIT-NO CHARGE - GICH ONLY: CPT | Performed by: OBSTETRICS & GYNECOLOGY

## 2024-03-28 ASSESSMENT — PAIN SCALES - GENERAL: PAINLEVEL: NO PAIN (0)

## 2024-03-28 NOTE — PROGRESS NOTES
Return OB Visit    S: Patient is uncomfortable but otherwise well. BH ctx but no VB or LOF. +FM    O: /84 (BP Location: Right arm, Patient Position: Sitting, Cuff Size: Adult Large)   Pulse 78   Wt 67.9 kg (149 lb 12.8 oz)   LMP 2023   SpO2 99%   BMI 29.26 kg/m    Gen: Well-appearing, NAD  See OB Flowsheet    A/P:  Nato Bradley is a 39 year old  at 34w5d by IVF dating, here for return OB visit.   IVF, surrogate pregnancy  - normal level 2 US  - fetal echo normal  - Growth US 3/14/2024 EFW  2677g (>98%ile). Repeat in 4 weeks  - weekly BPP starting at 36 weeks  - reviewed extensive birth plan 3/14/2024, intended parents plan to revise based on today's conversation and send update. Reviewed updated birth plan 3/28/2024- discussed a few persistent elements that are not standard practice (intended mother requesting no postpartum pitocin, which patient is not in agreement with). Discussed that best practice to provide safe care is what will be followed.     Low-lying, anterior placenta overlying prior  section scar: resolved at 32 weeks     Hx of c/s x1: plans  again, now planning delivery at [facility]. Care coordination involved to assist with legal paperwork     Hx of genital HSV: no outbreaks in years, plan acyclovir at 36 weeks     PNC: Rh positive, Rubella immune, , passed GTT  Genetics: normal aneuploidy screening  Imaging: dating US at 7w0d  Immunizations: none. Declines Tdap  RTC 2 weeks- GBS next visit, start acyclovir    Peri Henry MD FACOG  OB/GYN  3/28/2024 10:22 AM

## 2024-03-28 NOTE — NURSING NOTE
Chief Complaint   Patient presents with    Prenatal Care     34w5d     Baby moving good, denies vaginal bleeding and leaking of fluid.     Will recheck BP       Initial BP (!) 136/92 (BP Location: Right arm, Patient Position: Sitting, Cuff Size: Adult Regular)   Pulse 78   Wt 67.9 kg (149 lb 12.8 oz)   LMP 07/28/2023   SpO2 99%   BMI 29.26 kg/m   Estimated body mass index is 29.26 kg/m  as calculated from the following:    Height as of 3/14/24: 1.524 m (5').    Weight as of this encounter: 67.9 kg (149 lb 12.8 oz).  Medication Reconciliation: complete    Luli Pandey RN

## 2024-04-11 ENCOUNTER — HOSPITAL ENCOUNTER (OUTPATIENT)
Dept: ULTRASOUND IMAGING | Facility: OTHER | Age: 39
Discharge: HOME | End: 2024-04-11
Attending: OBSTETRICS & GYNECOLOGY
Payer: COMMERCIAL

## 2024-04-11 ENCOUNTER — PATIENT OUTREACH (OUTPATIENT)
Dept: CARE COORDINATION | Facility: CLINIC | Age: 39
End: 2024-04-11

## 2024-04-11 ENCOUNTER — PRENATAL OFFICE VISIT (OUTPATIENT)
Dept: OBGYN | Facility: OTHER | Age: 39
End: 2024-04-11
Attending: OBSTETRICS & GYNECOLOGY
Payer: COMMERCIAL

## 2024-04-11 VITALS
SYSTOLIC BLOOD PRESSURE: 128 MMHG | WEIGHT: 151 LBS | BODY MASS INDEX: 29.49 KG/M2 | HEART RATE: 76 BPM | DIASTOLIC BLOOD PRESSURE: 84 MMHG

## 2024-04-11 DIAGNOSIS — Z33.3 PREGNANCY IN PERSON ACTING AS GESTATIONAL SURROGATE: Primary | ICD-10-CM

## 2024-04-11 DIAGNOSIS — B00.9 HSV (HERPES SIMPLEX VIRUS) INFECTION: ICD-10-CM

## 2024-04-11 DIAGNOSIS — Z33.3 PREGNANCY IN PERSON ACTING AS GESTATIONAL SURROGATE: ICD-10-CM

## 2024-04-11 PROCEDURE — 76816 OB US FOLLOW-UP PER FETUS: CPT

## 2024-04-11 PROCEDURE — 87653 STREP B DNA AMP PROBE: CPT | Mod: ZL | Performed by: OBSTETRICS & GYNECOLOGY

## 2024-04-11 PROCEDURE — 99207 PR OB VISIT-NO CHARGE - GICH ONLY: CPT | Performed by: OBSTETRICS & GYNECOLOGY

## 2024-04-11 PROCEDURE — 76819 FETAL BIOPHYS PROFIL W/O NST: CPT

## 2024-04-11 RX ORDER — VALACYCLOVIR HYDROCHLORIDE 500 MG/1
500 TABLET, FILM COATED ORAL DAILY
Qty: 30 TABLET | Refills: 0 | Status: SHIPPED | OUTPATIENT
Start: 2024-04-11

## 2024-04-11 ASSESSMENT — PAIN SCALES - GENERAL: PAINLEVEL: NO PAIN (0)

## 2024-04-11 NOTE — PROGRESS NOTES
Return OB Visit    S: Patient is feeling well. Just uncomfortable. No ctx, VB or LOF. +FM    O: /84 (BP Location: Right arm, Patient Position: Sitting, Cuff Size: Adult Regular)   Pulse 76   Wt 68.5 kg (151 lb)   LMP 2023   BMI 29.49 kg/m    Gen: Well-appearing, NAD  See OB Flowsheet    BPP     A/P:  Nato Bradley is a 39 year old  at 36w5d by IVF dating, here for return OB visit.   IVF, surrogate pregnancy  - normal level 2 US  - fetal echo normal  - Growth US 3/14/2024 EFW  3509g (92%ile).   - weekly BPP starting at 36 weeks  - reviewed extensive birth plan 3/14/2024, intended parents plan to revise based on today's conversation and send update. Reviewed updated birth plan 3/28/2024- discussed a few persistent elements that are not standard practice (intended mother requesting no postpartum pitocin, which patient is not in agreement with). Discussed that best practice to provide safe care is what will be followed.     Low-lying, anterior placenta overlying prior  section scar: resolved at 32 weeks     Hx of c/s x1: plans  again, now planning delivery at [facility]. Care coordination involved to assist with legal paperwork     Hx of genital HSV: no outbreaks in years, plan acyclovir at 36 weeks     PNC: Rh positive, Rubella immune, , passed GTT  Genetics: normal aneuploidy screening  Imaging: dating US at 7w0d  Immunizations: none. Declines Tdap  RTC weekly    Peri Henry MD FACOG  OB/GYN  2024 10:10 AM

## 2024-04-11 NOTE — PROGRESS NOTES
"Clinic Care Coordination Contact    Received a call from patient's \"intended parent\" for baby Amy, this afternoon. Writer had worked with intended parent, Amy, on her expectations/concerns she had been having about delivery experience/birth plan.  Writer utilized active listening, reframing and encouragement in past discussions with Amy---as well as today.     Amy shared that she had opportunity to speak to Dr. FADIA Henry today and see the ultrasound.  She expressed reassurance from this encounter and that she is feeling more confident in the process than ever.  She reports feeling much less anxious re: the delivery now.  She is busy packing and planning her trip here.  She reports that [employer] is ready and this is reassuring to her as well. She had plenty of breast milk sent ahead.     Amy said that she had asked patient about what she will need from her during the delivery.  Patient shared that she \"is not a chatty person\" and did not want to have Amy \"staring at her a lot\" as she is delivering and uncomfortable. Amy said that she is \"fine\" with this approach and feels confident she can respect patient's request.  Writer reassured intended mother that the staff will assist in collaborating the timing of the delivery.     Left a voicemail with patient letting her know about writer's conversation with Amy.  Writer congratulated the patient for hanging in there. Offered encouragement that she is on the home stretch. Validated that intended mother, Amy, appeared to be very content with current birth plan and excited about next week's delivery.     Writer also emailed Aysha Contreras asking re: Birth plan and legal documents needed as surrogacy delivery is anticipated to be soon.      Plan: Continue to be available to assess and assist with any concerns or needs with relationships, birth plans and/or legal documents/process. Continue to offer active listening, reframing, validation and encouragement " "as needed.     DERIAN CARNES on 4/11/2024 at 4:38 PM    Clinic Care Coordination Contact      Met with intended parents, Amy and Ace, today at hospital. Assisted with a tour of Women's Health and Birth unit.  They are now in town and getting settled in their near by [employer] as the surrogate/patient is anticipated to deliver their son in next week or two.     Writer has also spoken to Surrogacy Agent, Aysha Contreras, x2 today re: writer's interaction with intended birth parents.  Also discussed legal documents that are needed yet prior to child being born.  Aysha stated that the completed documents are not ready as of yet and are currently being processed.  They are awaiting signatures.     Writer spoke with  [facility] RN manager, Carolyne Reagan, this afternoon to discuss the tour and the legal documents that still need to be in place to honor the surrogacy arrangement.  Writer will stay in communication with patient, intended parents and Surrogacy agency to continue to encourage follow through of necessary paperwork.     Plan: Ongoing Care Coordination to assess and assist with communication with all parties to promote collaboration of medical and legal process.     DERIAN CARNES on 4/16/2024 at 6:09 PM      Clinic Care Coordination Contact    Writer initiated text to Surrogacy Agent, Aysha, to inquire re: patient's surrogacy legal paperwork.    Aysha messaged that paperwork is now with \"legal team at hospital\".  She did clarify that there is one signature that they are waiting on from a fertility clinic MD. Writer emailed the Risk Management , Tammi Jaeger, as well as MD and unit manager, Carolyne Reagan, to convey message.    Writer just now circled back to Aysha again to check on status and she encouraged writer to defer to hospital legal team.  Writer will pursue in AM again.     Writer also spoke to [facility] nursing station and discussed with RN/Charge, Arlyn, re: this and updated them as to the " status.     Plan: Continue to assist in communication with team to get Legal documents on the unit. Be available to patient and intended parents for any resources needed.     DERIAN CARNES on 4/17/2024 at 5:24 PM

## 2024-04-12 LAB — GP B STREP DNA SPEC QL NAA+PROBE: NEGATIVE

## 2024-04-18 ENCOUNTER — HOSPITAL ENCOUNTER (OUTPATIENT)
Dept: ULTRASOUND IMAGING | Facility: OTHER | Age: 39
Discharge: HOME | End: 2024-04-18
Attending: OBSTETRICS & GYNECOLOGY
Payer: COMMERCIAL

## 2024-04-18 ENCOUNTER — PRENATAL OFFICE VISIT (OUTPATIENT)
Dept: OBGYN | Facility: OTHER | Age: 39
End: 2024-04-18
Attending: OBSTETRICS & GYNECOLOGY
Payer: COMMERCIAL

## 2024-04-18 VITALS
DIASTOLIC BLOOD PRESSURE: 80 MMHG | SYSTOLIC BLOOD PRESSURE: 124 MMHG | HEART RATE: 68 BPM | BODY MASS INDEX: 29.88 KG/M2 | WEIGHT: 153 LBS

## 2024-04-18 DIAGNOSIS — Z33.3 PREGNANCY IN PERSON ACTING AS GESTATIONAL SURROGATE: ICD-10-CM

## 2024-04-18 DIAGNOSIS — Z33.3 PREGNANCY IN PERSON ACTING AS GESTATIONAL SURROGATE: Primary | ICD-10-CM

## 2024-04-18 PROCEDURE — 99207 PR OB VISIT-NO CHARGE - GICH ONLY: CPT | Performed by: OBSTETRICS & GYNECOLOGY

## 2024-04-18 PROCEDURE — 76819 FETAL BIOPHYS PROFIL W/O NST: CPT

## 2024-04-18 ASSESSMENT — PAIN SCALES - GENERAL: PAINLEVEL: NO PAIN (0)

## 2024-04-18 NOTE — PROGRESS NOTES
Clinic Care Coordination Contact    Initiated message with Monserrat Jaeger, Risk Management, today inquiring re: Legal documents for Parentage.     Monserrat emailed back stating that legal documents are still out for appropriate signatures and legal teams are working on it as of yet.     Plan: Ongoing Care Coordination to assess and offer assist with community resources/referrals; ongoing encouragement/support as needed.     DERIAN CARNES on 4/18/2024 at 5:05 PM

## 2024-04-18 NOTE — PROGRESS NOTES
Return OB Visit    S: Patient is feeling well. No regular ctx. No VB or LOF. +FM    Here today with Amy and Ace, intended couple.    O: /80 (BP Location: Right arm, Patient Position: Sitting, Cuff Size: Adult Regular)   Pulse 68   Wt 69.4 kg (153 lb)   LMP 2023   BMI 29.88 kg/m    Gen: Well-appearing, NAD  See OB Flowsheet    BPP     A/P:  Nato Bradley is a 39 year old  at 37w5d by IVF dating, here for return OB visit.   IVF, surrogate pregnancy  - normal level 2 US  - fetal echo normal  - Growth US 3/14/2024 EFW  3509g (92%ile).   - weekly BPP starting at 36 weeks  - reviewed extensive birth plan 3/14/2024, intended parents plan to revise based on today's conversation and send update. Reviewed updated birth plan 3/28/2024- discussed a few persistent elements that are not standard practice (intended mother requesting no postpartum pitocin, which patient is not in agreement with). Discussed that best practice to provide safe care is what will be followed.     Low-lying, anterior placenta overlying prior  section scar: resolved at 32 weeks     Hx of c/s x1: plans  again, now planning delivery at [facility]. Care coordination involved to assist with legal paperwork     Hx of genital HSV: no outbreaks in years, plan acyclovir at 36 weeks     PNC: Rh positive, Rubella immune, , passed GTT  Genetics: normal aneuploidy screening  Imaging: dating US at 7w0d  Immunizations: none. Declines Tdap  RTC weekly    Peri Henry MD FACOG  OB/GYN  2024 9:58 AM

## 2024-04-18 NOTE — NURSING NOTE
Chief Complaint   Patient presents with    Prenatal Care     37w5d       Medication Reconciliation: complete    Pt presents to clinic today for prenatal care. Pt denies any bleeding, or leakage of fluid at this time. States baby is moving good. Patient denies any regular contractions.       Belkis Manzo LPN........................4/18/2024  10:07 AM

## 2024-04-23 ENCOUNTER — PRENATAL OFFICE VISIT (OUTPATIENT)
Dept: OBGYN | Facility: OTHER | Age: 39
End: 2024-04-23
Attending: OBSTETRICS & GYNECOLOGY
Payer: COMMERCIAL

## 2024-04-23 ENCOUNTER — ANESTHESIA (OUTPATIENT)
Dept: OBGYN | Facility: OTHER | Age: 39
End: 2024-04-23
Payer: COMMERCIAL

## 2024-04-23 ENCOUNTER — HOSPITAL ENCOUNTER (INPATIENT)
Facility: OTHER | Age: 39
LOS: 3 days | Discharge: HOME | End: 2024-04-26
Attending: OBSTETRICS & GYNECOLOGY | Admitting: OBSTETRICS & GYNECOLOGY
Payer: COMMERCIAL

## 2024-04-23 ENCOUNTER — ANESTHESIA EVENT (OUTPATIENT)
Dept: OBGYN | Facility: OTHER | Age: 39
End: 2024-04-23
Payer: COMMERCIAL

## 2024-04-23 VITALS
WEIGHT: 154 LBS | DIASTOLIC BLOOD PRESSURE: 100 MMHG | BODY MASS INDEX: 30.08 KG/M2 | HEART RATE: 80 BPM | SYSTOLIC BLOOD PRESSURE: 180 MMHG

## 2024-04-23 DIAGNOSIS — O14.93 PRE-ECLAMPSIA IN THIRD TRIMESTER: ICD-10-CM

## 2024-04-23 DIAGNOSIS — O34.219 VBAC (VAGINAL BIRTH AFTER CESAREAN): Primary | ICD-10-CM

## 2024-04-23 DIAGNOSIS — Z33.3 PREGNANCY IN PERSON ACTING AS GESTATIONAL SURROGATE: Primary | ICD-10-CM

## 2024-04-23 PROBLEM — O14.90 PREECLAMPSIA: Status: ACTIVE | Noted: 2024-04-23

## 2024-04-23 LAB
ABO/RH(D): NORMAL
ALBUMIN MFR UR ELPH: 35.6 MG/DL
ALBUMIN SERPL BCG-MCNC: 3.2 G/DL (ref 3.5–5.2)
ALP SERPL-CCNC: 227 U/L (ref 40–150)
ALT SERPL W P-5'-P-CCNC: 6 U/L (ref 0–50)
ANION GAP SERPL CALCULATED.3IONS-SCNC: 13 MMOL/L (ref 7–15)
ANTIBODY SCREEN: NEGATIVE
AST SERPL W P-5'-P-CCNC: 18 U/L (ref 0–45)
BILIRUB SERPL-MCNC: 0.2 MG/DL
BUN SERPL-MCNC: 10.6 MG/DL (ref 6–20)
CALCIUM SERPL-MCNC: 8.7 MG/DL (ref 8.6–10)
CHLORIDE SERPL-SCNC: 104 MMOL/L (ref 98–107)
CREAT SERPL-MCNC: 0.67 MG/DL (ref 0.51–0.95)
CREAT UR-MCNC: 109.4 MG/DL
DEPRECATED HCO3 PLAS-SCNC: 19 MMOL/L (ref 22–29)
EGFRCR SERPLBLD CKD-EPI 2021: >90 ML/MIN/1.73M2
ERYTHROCYTE [DISTWIDTH] IN BLOOD BY AUTOMATED COUNT: 14.2 % (ref 10–15)
GLUCOSE SERPL-MCNC: 87 MG/DL (ref 70–99)
HCT VFR BLD AUTO: 31.9 % (ref 35–47)
HGB BLD-MCNC: 10 G/DL (ref 11.7–15.7)
MCH RBC QN AUTO: 25.3 PG (ref 26.5–33)
MCHC RBC AUTO-ENTMCNC: 31.3 G/DL (ref 31.5–36.5)
MCV RBC AUTO: 81 FL (ref 78–100)
PLATELET # BLD AUTO: 228 10E3/UL (ref 150–450)
POTASSIUM SERPL-SCNC: 4.1 MMOL/L (ref 3.4–5.3)
PROT SERPL-MCNC: 6.3 G/DL (ref 6.4–8.3)
PROT/CREAT 24H UR: 0.33 MG/MG CR (ref 0–0.2)
RBC # BLD AUTO: 3.95 10E6/UL (ref 3.8–5.2)
SODIUM SERPL-SCNC: 136 MMOL/L (ref 135–145)
SPECIMEN EXPIRATION DATE: NORMAL
WBC # BLD AUTO: 8.5 10E3/UL (ref 4–11)

## 2024-04-23 PROCEDURE — 84156 ASSAY OF PROTEIN URINE: CPT | Performed by: OBSTETRICS & GYNECOLOGY

## 2024-04-23 PROCEDURE — 722N000001 HC LABOR CARE VAGINAL DELIVERY SINGLE

## 2024-04-23 PROCEDURE — 86900 BLOOD TYPING SEROLOGIC ABO: CPT | Performed by: OBSTETRICS & GYNECOLOGY

## 2024-04-23 PROCEDURE — 10907ZC DRAINAGE OF AMNIOTIC FLUID, THERAPEUTIC FROM PRODUCTS OF CONCEPTION, VIA NATURAL OR ARTIFICIAL OPENING: ICD-10-PCS | Performed by: OBSTETRICS & GYNECOLOGY

## 2024-04-23 PROCEDURE — 36410 VNPNXR 3YR/> PHY/QHP DX/THER: CPT | Mod: XU

## 2024-04-23 PROCEDURE — 99207 PR OB VISIT-NO CHARGE - GICH ONLY: CPT | Performed by: OBSTETRICS & GYNECOLOGY

## 2024-04-23 PROCEDURE — 36415 COLL VENOUS BLD VENIPUNCTURE: CPT | Performed by: OBSTETRICS & GYNECOLOGY

## 2024-04-23 PROCEDURE — 250N000011 HC RX IP 250 OP 636: Performed by: OBSTETRICS & GYNECOLOGY

## 2024-04-23 PROCEDURE — 86780 TREPONEMA PALLIDUM: CPT | Performed by: OBSTETRICS & GYNECOLOGY

## 2024-04-23 PROCEDURE — 370N000003 HC ANESTHESIA WARD SERVICE

## 2024-04-23 PROCEDURE — 85048 AUTOMATED LEUKOCYTE COUNT: CPT | Performed by: OBSTETRICS & GYNECOLOGY

## 2024-04-23 PROCEDURE — 250N000013 HC RX MED GY IP 250 OP 250 PS 637: Performed by: OBSTETRICS & GYNECOLOGY

## 2024-04-23 PROCEDURE — 250N000011 HC RX IP 250 OP 636

## 2024-04-23 PROCEDURE — G0463 HOSPITAL OUTPT CLINIC VISIT: HCPCS | Mod: 25

## 2024-04-23 PROCEDURE — 250N000009 HC RX 250: Performed by: OBSTETRICS & GYNECOLOGY

## 2024-04-23 PROCEDURE — 258N000003 HC RX IP 258 OP 636: Performed by: OBSTETRICS & GYNECOLOGY

## 2024-04-23 PROCEDURE — 80053 COMPREHEN METABOLIC PANEL: CPT | Performed by: OBSTETRICS & GYNECOLOGY

## 2024-04-23 PROCEDURE — 120N000001 HC R&B MED SURG/OB

## 2024-04-23 PROCEDURE — 250N000009 HC RX 250

## 2024-04-23 RX ORDER — METOCLOPRAMIDE HYDROCHLORIDE 5 MG/ML
10 INJECTION INTRAMUSCULAR; INTRAVENOUS EVERY 6 HOURS PRN
Status: DISCONTINUED | OUTPATIENT
Start: 2024-04-23 | End: 2024-04-23 | Stop reason: HOSPADM

## 2024-04-23 RX ORDER — LOPERAMIDE HCL 2 MG
4 CAPSULE ORAL
Status: DISCONTINUED | OUTPATIENT
Start: 2024-04-23 | End: 2024-04-26 | Stop reason: HOSPADM

## 2024-04-23 RX ORDER — KETOROLAC TROMETHAMINE 30 MG/ML
30 INJECTION, SOLUTION INTRAMUSCULAR; INTRAVENOUS
Status: DISCONTINUED | OUTPATIENT
Start: 2024-04-23 | End: 2024-04-23

## 2024-04-23 RX ORDER — NALOXONE HYDROCHLORIDE 0.4 MG/ML
0.4 INJECTION, SOLUTION INTRAMUSCULAR; INTRAVENOUS; SUBCUTANEOUS
Status: DISCONTINUED | OUTPATIENT
Start: 2024-04-23 | End: 2024-04-23 | Stop reason: HOSPADM

## 2024-04-23 RX ORDER — CITRIC ACID/SODIUM CITRATE 334-500MG
30 SOLUTION, ORAL ORAL
Status: COMPLETED | OUTPATIENT
Start: 2024-04-23 | End: 2024-04-23

## 2024-04-23 RX ORDER — ONDANSETRON 2 MG/ML
4 INJECTION INTRAMUSCULAR; INTRAVENOUS EVERY 6 HOURS PRN
Status: DISCONTINUED | OUTPATIENT
Start: 2024-04-23 | End: 2024-04-23 | Stop reason: HOSPADM

## 2024-04-23 RX ORDER — OXYTOCIN/0.9 % SODIUM CHLORIDE 30/500 ML
340 PLASTIC BAG, INJECTION (ML) INTRAVENOUS CONTINUOUS PRN
Status: DISCONTINUED | OUTPATIENT
Start: 2024-04-23 | End: 2024-04-23 | Stop reason: HOSPADM

## 2024-04-23 RX ORDER — OXYTOCIN 10 [USP'U]/ML
10 INJECTION, SOLUTION INTRAMUSCULAR; INTRAVENOUS
Status: DISCONTINUED | OUTPATIENT
Start: 2024-04-23 | End: 2024-04-26 | Stop reason: HOSPADM

## 2024-04-23 RX ORDER — LOPERAMIDE HCL 2 MG
2 CAPSULE ORAL
Status: DISCONTINUED | OUTPATIENT
Start: 2024-04-23 | End: 2024-04-26 | Stop reason: HOSPADM

## 2024-04-23 RX ORDER — NALBUPHINE HYDROCHLORIDE 20 MG/ML
2.5-5 INJECTION, SOLUTION INTRAMUSCULAR; INTRAVENOUS; SUBCUTANEOUS EVERY 6 HOURS PRN
Status: DISCONTINUED | OUTPATIENT
Start: 2024-04-23 | End: 2024-04-23

## 2024-04-23 RX ORDER — DOCUSATE SODIUM 100 MG/1
100 CAPSULE, LIQUID FILLED ORAL DAILY
Status: DISCONTINUED | OUTPATIENT
Start: 2024-04-23 | End: 2024-04-26 | Stop reason: HOSPADM

## 2024-04-23 RX ORDER — SODIUM CHLORIDE, SODIUM LACTATE, POTASSIUM CHLORIDE, CALCIUM CHLORIDE 600; 310; 30; 20 MG/100ML; MG/100ML; MG/100ML; MG/100ML
10-125 INJECTION, SOLUTION INTRAVENOUS CONTINUOUS
Status: DISCONTINUED | OUTPATIENT
Start: 2024-04-23 | End: 2024-04-26

## 2024-04-23 RX ORDER — ACETAMINOPHEN 325 MG/1
650 TABLET ORAL EVERY 4 HOURS PRN
Status: DISCONTINUED | OUTPATIENT
Start: 2024-04-23 | End: 2024-04-26 | Stop reason: HOSPADM

## 2024-04-23 RX ORDER — TERBUTALINE SULFATE 1 MG/ML
0.25 INJECTION, SOLUTION SUBCUTANEOUS
Status: DISCONTINUED | OUTPATIENT
Start: 2024-04-23 | End: 2024-04-23 | Stop reason: HOSPADM

## 2024-04-23 RX ORDER — TRANEXAMIC ACID 10 MG/ML
1 INJECTION, SOLUTION INTRAVENOUS EVERY 30 MIN PRN
Status: DISCONTINUED | OUTPATIENT
Start: 2024-04-23 | End: 2024-04-23 | Stop reason: HOSPADM

## 2024-04-23 RX ORDER — PROCHLORPERAZINE 25 MG
25 SUPPOSITORY, RECTAL RECTAL EVERY 12 HOURS PRN
Status: DISCONTINUED | OUTPATIENT
Start: 2024-04-23 | End: 2024-04-23 | Stop reason: HOSPADM

## 2024-04-23 RX ORDER — MODIFIED LANOLIN
OINTMENT (GRAM) TOPICAL
Status: DISCONTINUED | OUTPATIENT
Start: 2024-04-23 | End: 2024-04-26 | Stop reason: HOSPADM

## 2024-04-23 RX ORDER — METHYLERGONOVINE MALEATE 0.2 MG/ML
200 INJECTION INTRAVENOUS
Status: DISCONTINUED | OUTPATIENT
Start: 2024-04-23 | End: 2024-04-23 | Stop reason: HOSPADM

## 2024-04-23 RX ORDER — CARBOPROST TROMETHAMINE 250 UG/ML
250 INJECTION, SOLUTION INTRAMUSCULAR
Status: DISCONTINUED | OUTPATIENT
Start: 2024-04-23 | End: 2024-04-23 | Stop reason: HOSPADM

## 2024-04-23 RX ORDER — LIDOCAINE HYDROCHLORIDE AND EPINEPHRINE 15; 5 MG/ML; UG/ML
INJECTION, SOLUTION EPIDURAL PRN
Status: DISCONTINUED | OUTPATIENT
Start: 2024-04-23 | End: 2024-04-23

## 2024-04-23 RX ORDER — NALOXONE HYDROCHLORIDE 0.4 MG/ML
0.2 INJECTION, SOLUTION INTRAMUSCULAR; INTRAVENOUS; SUBCUTANEOUS
Status: DISCONTINUED | OUTPATIENT
Start: 2024-04-23 | End: 2024-04-23 | Stop reason: HOSPADM

## 2024-04-23 RX ORDER — MISOPROSTOL 100 UG/1
400 TABLET ORAL
Status: DISCONTINUED | OUTPATIENT
Start: 2024-04-23 | End: 2024-04-23 | Stop reason: HOSPADM

## 2024-04-23 RX ORDER — LOPERAMIDE HCL 2 MG
4 CAPSULE ORAL
Status: DISCONTINUED | OUTPATIENT
Start: 2024-04-23 | End: 2024-04-23 | Stop reason: HOSPADM

## 2024-04-23 RX ORDER — LOPERAMIDE HCL 2 MG
2 CAPSULE ORAL
Status: DISCONTINUED | OUTPATIENT
Start: 2024-04-23 | End: 2024-04-23 | Stop reason: HOSPADM

## 2024-04-23 RX ORDER — LIDOCAINE 40 MG/G
CREAM TOPICAL
Status: DISCONTINUED | OUTPATIENT
Start: 2024-04-23 | End: 2024-04-23 | Stop reason: HOSPADM

## 2024-04-23 RX ORDER — HYDRALAZINE HYDROCHLORIDE 20 MG/ML
10 INJECTION INTRAMUSCULAR; INTRAVENOUS
Status: DISCONTINUED | OUTPATIENT
Start: 2024-04-23 | End: 2024-04-26 | Stop reason: HOSPADM

## 2024-04-23 RX ORDER — OXYTOCIN/0.9 % SODIUM CHLORIDE 30/500 ML
340 PLASTIC BAG, INJECTION (ML) INTRAVENOUS CONTINUOUS PRN
Status: DISCONTINUED | OUTPATIENT
Start: 2024-04-23 | End: 2024-04-26 | Stop reason: HOSPADM

## 2024-04-23 RX ORDER — METOCLOPRAMIDE 10 MG/1
10 TABLET ORAL EVERY 6 HOURS PRN
Status: DISCONTINUED | OUTPATIENT
Start: 2024-04-23 | End: 2024-04-23 | Stop reason: HOSPADM

## 2024-04-23 RX ORDER — OXYTOCIN 10 [USP'U]/ML
10 INJECTION, SOLUTION INTRAMUSCULAR; INTRAVENOUS
Status: DISCONTINUED | OUTPATIENT
Start: 2024-04-23 | End: 2024-04-23 | Stop reason: HOSPADM

## 2024-04-23 RX ORDER — FENTANYL CITRATE-0.9 % NACL/PF 10 MCG/ML
100 PLASTIC BAG, INJECTION (ML) INTRAVENOUS EVERY 5 MIN PRN
Status: DISCONTINUED | OUTPATIENT
Start: 2024-04-23 | End: 2024-04-23 | Stop reason: HOSPADM

## 2024-04-23 RX ORDER — OXYTOCIN/0.9 % SODIUM CHLORIDE 30/500 ML
1-24 PLASTIC BAG, INJECTION (ML) INTRAVENOUS CONTINUOUS
Status: DISCONTINUED | OUTPATIENT
Start: 2024-04-23 | End: 2024-04-23 | Stop reason: HOSPADM

## 2024-04-23 RX ORDER — ONDANSETRON 4 MG/1
4 TABLET, ORALLY DISINTEGRATING ORAL EVERY 6 HOURS PRN
Status: DISCONTINUED | OUTPATIENT
Start: 2024-04-23 | End: 2024-04-23 | Stop reason: HOSPADM

## 2024-04-23 RX ORDER — CARBOPROST TROMETHAMINE 250 UG/ML
250 INJECTION, SOLUTION INTRAMUSCULAR
Status: DISCONTINUED | OUTPATIENT
Start: 2024-04-23 | End: 2024-04-26 | Stop reason: HOSPADM

## 2024-04-23 RX ORDER — SODIUM CHLORIDE, SODIUM LACTATE, POTASSIUM CHLORIDE, CALCIUM CHLORIDE 600; 310; 30; 20 MG/100ML; MG/100ML; MG/100ML; MG/100ML
INJECTION, SOLUTION INTRAVENOUS CONTINUOUS PRN
Status: DISCONTINUED | OUTPATIENT
Start: 2024-04-23 | End: 2024-04-23 | Stop reason: HOSPADM

## 2024-04-23 RX ORDER — MISOPROSTOL 100 UG/1
400 TABLET ORAL
Status: DISCONTINUED | OUTPATIENT
Start: 2024-04-23 | End: 2024-04-26 | Stop reason: HOSPADM

## 2024-04-23 RX ORDER — HYDROCORTISONE 25 MG/G
CREAM TOPICAL 3 TIMES DAILY PRN
Status: DISCONTINUED | OUTPATIENT
Start: 2024-04-23 | End: 2024-04-26 | Stop reason: HOSPADM

## 2024-04-23 RX ORDER — LABETALOL HYDROCHLORIDE 5 MG/ML
20-80 INJECTION, SOLUTION INTRAVENOUS EVERY 10 MIN PRN
Status: DISCONTINUED | OUTPATIENT
Start: 2024-04-23 | End: 2024-04-26 | Stop reason: HOSPADM

## 2024-04-23 RX ORDER — TRANEXAMIC ACID 10 MG/ML
1 INJECTION, SOLUTION INTRAVENOUS EVERY 30 MIN PRN
Status: DISCONTINUED | OUTPATIENT
Start: 2024-04-23 | End: 2024-04-26 | Stop reason: HOSPADM

## 2024-04-23 RX ORDER — SODIUM CHLORIDE, SODIUM LACTATE, POTASSIUM CHLORIDE, CALCIUM CHLORIDE 600; 310; 30; 20 MG/100ML; MG/100ML; MG/100ML; MG/100ML
INJECTION, SOLUTION INTRAVENOUS CONTINUOUS
Status: DISCONTINUED | OUTPATIENT
Start: 2024-04-23 | End: 2024-04-23 | Stop reason: HOSPADM

## 2024-04-23 RX ORDER — OXYTOCIN 10 [USP'U]/ML
10 INJECTION, SOLUTION INTRAMUSCULAR; INTRAVENOUS
Status: DISCONTINUED | OUTPATIENT
Start: 2024-04-23 | End: 2024-04-23

## 2024-04-23 RX ORDER — BUPIVACAINE HYDROCHLORIDE 2.5 MG/ML
5 INJECTION, SOLUTION EPIDURAL; INFILTRATION; INTRACAUDAL ONCE
Status: COMPLETED | OUTPATIENT
Start: 2024-04-23 | End: 2024-04-23

## 2024-04-23 RX ORDER — IBUPROFEN 400 MG/1
800 TABLET, FILM COATED ORAL EVERY 6 HOURS PRN
Status: DISCONTINUED | OUTPATIENT
Start: 2024-04-23 | End: 2024-04-26 | Stop reason: HOSPADM

## 2024-04-23 RX ORDER — IBUPROFEN 400 MG/1
800 TABLET, FILM COATED ORAL
Status: DISCONTINUED | OUTPATIENT
Start: 2024-04-23 | End: 2024-04-23

## 2024-04-23 RX ORDER — BISACODYL 10 MG
10 SUPPOSITORY, RECTAL RECTAL DAILY PRN
Status: DISCONTINUED | OUTPATIENT
Start: 2024-04-23 | End: 2024-04-26 | Stop reason: HOSPADM

## 2024-04-23 RX ORDER — METHYLERGONOVINE MALEATE 0.2 MG/ML
200 INJECTION INTRAVENOUS
Status: DISCONTINUED | OUTPATIENT
Start: 2024-04-23 | End: 2024-04-26 | Stop reason: HOSPADM

## 2024-04-23 RX ORDER — LIDOCAINE HYDROCHLORIDE 10 MG/ML
INJECTION, SOLUTION EPIDURAL; INFILTRATION; INTRACAUDAL; PERINEURAL PRN
Status: DISCONTINUED | OUTPATIENT
Start: 2024-04-23 | End: 2024-04-23

## 2024-04-23 RX ORDER — OXYTOCIN/0.9 % SODIUM CHLORIDE 30/500 ML
100-340 PLASTIC BAG, INJECTION (ML) INTRAVENOUS CONTINUOUS PRN
Status: DISCONTINUED | OUTPATIENT
Start: 2024-04-23 | End: 2024-04-23

## 2024-04-23 RX ORDER — LIDOCAINE 40 MG/G
CREAM TOPICAL
Status: DISCONTINUED | OUTPATIENT
Start: 2024-04-23 | End: 2024-04-26 | Stop reason: HOSPADM

## 2024-04-23 RX ORDER — PROCHLORPERAZINE MALEATE 10 MG
10 TABLET ORAL EVERY 6 HOURS PRN
Status: DISCONTINUED | OUTPATIENT
Start: 2024-04-23 | End: 2024-04-23 | Stop reason: HOSPADM

## 2024-04-23 RX ADMIN — Medication 1 MILLI-UNITS/MIN: at 13:36

## 2024-04-23 RX ADMIN — Medication 100 MCG: at 14:50

## 2024-04-23 RX ADMIN — SODIUM CITRATE AND CITRIC ACID MONOHYDRATE 30 ML: 500; 334 SOLUTION ORAL at 14:15

## 2024-04-23 RX ADMIN — ONDANSETRON 4 MG: 2 INJECTION INTRAMUSCULAR; INTRAVENOUS at 15:10

## 2024-04-23 RX ADMIN — IBUPROFEN 800 MG: 400 TABLET, FILM COATED ORAL at 19:48

## 2024-04-23 RX ADMIN — DOCUSATE SODIUM 100 MG: 100 CAPSULE, LIQUID FILLED ORAL at 19:48

## 2024-04-23 RX ADMIN — LIDOCAINE HYDROCHLORIDE 3 ML: 10 INJECTION, SOLUTION EPIDURAL; INFILTRATION; INTRACAUDAL; PERINEURAL at 14:20

## 2024-04-23 RX ADMIN — Medication 100 MCG: at 15:00

## 2024-04-23 RX ADMIN — Medication 10 ML/HR: at 14:35

## 2024-04-23 RX ADMIN — ACETAMINOPHEN 650 MG: 325 TABLET, FILM COATED ORAL at 22:35

## 2024-04-23 RX ADMIN — Medication 100 MCG: at 15:05

## 2024-04-23 RX ADMIN — SODIUM CHLORIDE, SODIUM LACTATE, POTASSIUM CHLORIDE, AND CALCIUM CHLORIDE 1000 ML: 600; 310; 30; 20 INJECTION, SOLUTION INTRAVENOUS at 16:40

## 2024-04-23 RX ADMIN — LIDOCAINE HYDROCHLORIDE AND EPINEPHRINE 2.5 ML: 15; 5 INJECTION, SOLUTION EPIDURAL at 14:28

## 2024-04-23 RX ADMIN — BUPIVACAINE HYDROCHLORIDE 6 ML: 2.5 INJECTION, SOLUTION EPIDURAL; INFILTRATION; INTRACAUDAL; PERINEURAL at 14:31

## 2024-04-23 RX ADMIN — LIDOCAINE HYDROCHLORIDE AND EPINEPHRINE 2.5 ML: 15; 5 INJECTION, SOLUTION EPIDURAL at 14:25

## 2024-04-23 RX ADMIN — SODIUM CHLORIDE, POTASSIUM CHLORIDE, SODIUM LACTATE AND CALCIUM CHLORIDE 1000 ML: 600; 310; 30; 20 INJECTION, SOLUTION INTRAVENOUS at 13:35

## 2024-04-23 ASSESSMENT — PAIN SCALES - GENERAL: PAINLEVEL: NO PAIN (0)

## 2024-04-23 ASSESSMENT — ACTIVITIES OF DAILY LIVING (ADL)
ADLS_ACUITY_SCORE: 35
ADLS_ACUITY_SCORE: 18
ADLS_ACUITY_SCORE: 35
ADLS_ACUITY_SCORE: 18
ADLS_ACUITY_SCORE: 35
ADLS_ACUITY_SCORE: 35
ADLS_ACUITY_SCORE: 18
ADLS_ACUITY_SCORE: 35

## 2024-04-23 NOTE — NURSING NOTE
Chief Complaint   Patient presents with    Prenatal Care     38w3d       Medication Reconciliation: complete  Pt presents to clinic today for prenatal care. Pt denies any bleeding, or leakage of fluid at this time. States baby is moving good. Patient denies any regular contractions.       Belkis Manzo LPN........................4/23/2024  9:24 AM

## 2024-04-23 NOTE — ANESTHESIA PREPROCEDURE EVALUATION
Anesthesia Pre-Procedure Evaluation    Patient: Nato Bradley   MRN: [id number] : 1985        Procedure :           Past Medical History:   Diagnosis Date    2019 novel coronavirus disease (COVID-19) 2021    Depressive disorder     Genital herpes     History of human papillomavirus infection     Personal history of other mental and behavioral disorders     No Comments Provided    Urinary tract infection       Past Surgical History:   Procedure Laterality Date    C/SECTION, LOW TRANSVERSE       SECTION      No Comments Provided    EXTRACTION(S) DENTAL      No Comments Provided      No Known Allergies   Social History     Tobacco Use    Smoking status: Never    Smokeless tobacco: Never   Substance Use Topics    Alcohol use: Not Currently     Alcohol/week: 0.0 standard drinks of alcohol     Comment: occasional      Wt Readings from Last 1 Encounters:   24 69.9 kg (154 lb)        Anesthesia Evaluation   Pt has had prior anesthetic. Type: Regional.    No history of anesthetic complications       ROS/MED HX  ENT/Pulmonary:  - neg pulmonary ROS     Neurologic:  - neg neurologic ROS     Cardiovascular:     (+)  hypertension- -   -  - -                                      METS/Exercise Tolerance: >4 METS    Hematologic: Comments: Denies use of any blood thinners    Platelet Count       Date                     Value               Ref Range           Status                2024               228                 150 - 450 10e3*     Final                 2021               304                 150 - 450 10e9*     Final            ----------       Musculoskeletal:  - neg musculoskeletal ROS     GI/Hepatic:       Renal/Genitourinary:       Endo:       Psychiatric/Substance Use:     (+) psychiatric history anxiety and depression       Infectious Disease:       Malignancy:       Other: Comment: Currently pregnant  Preeclampsia without severe features     (+) Possibly pregnant, , ,    "      Physical Exam    Airway        Mallampati: II   TM distance: > 3 FB   Neck ROM: full   Mouth opening: > 3 cm    Respiratory Devices and Support         Dental       (+) Completely normal teeth      Cardiovascular   cardiovascular exam normal          Pulmonary   pulmonary exam normal                OUTSIDE LABS:  CBC:   Lab Results   Component Value Date    WBC 8.5 04/23/2024    WBC 9.5 01/18/2024    HGB 10.0 (L) 04/23/2024    HGB 12.0 01/18/2024    HCT 31.9 (L) 04/23/2024    HCT 34.7 (L) 01/18/2024     04/23/2024     01/18/2024     BMP:   Lab Results   Component Value Date     04/23/2024     07/14/2023    POTASSIUM 4.1 04/23/2024    POTASSIUM 4.6 07/14/2023    CHLORIDE 104 04/23/2024    CHLORIDE 102 07/14/2023    CO2 19 (L) 04/23/2024    CO2 26 07/14/2023    BUN 10.6 04/23/2024    BUN 7.0 07/14/2023    CR 0.67 04/23/2024    CR 0.62 07/14/2023    GLC 87 04/23/2024     (H) 07/14/2023     COAGS: No results found for: \"PTT\", \"INR\", \"FIBR\"  POC:   Lab Results   Component Value Date    HCG Negative 12/19/2017     HEPATIC:   Lab Results   Component Value Date    ALBUMIN 3.2 (L) 04/23/2024    PROTTOTAL 6.3 (L) 04/23/2024    ALT 6 04/23/2024    AST 18 04/23/2024    ALKPHOS 227 (H) 04/23/2024    BILITOTAL 0.2 04/23/2024     OTHER:   Lab Results   Component Value Date    A1C 5.0 08/16/2019    RICHARD 8.7 04/23/2024    TSH 1.33 12/28/2021    T4 0.66 02/21/2019    T3 119 02/21/2019    SED 4 07/17/2015       Anesthesia Plan    ASA Status:  2       Anesthesia Type: Epidural.              Consents    Anesthesia Plan(s) and associated risks, benefits, and realistic alternatives discussed. Questions answered and patient/representative(s) expressed understanding.     - Discussed: Risks, Benefits and Alternatives for the PROCEDURE were discussed     - Discussed with:  Patient      - Extended Intubation/Ventilatory Support Discussed: No.      - Patient is DNR/DNI Status: No     Use of blood products " discussed: No .     Postoperative Care            Comments:               LEIF Gonzales CRNA    I have reviewed the pertinent notes and labs in the chart from the past 30 days and (re)examined the patient.  Any updates or changes from those notes are reflected in this note.          # Hypoalbuminemia: Lowest albumin = 3.2 g/dL at 4/23/2024 10:26 AM, will monitor as appropriate

## 2024-04-23 NOTE — PROGRESS NOTES
Return OB Visit    S: Patient is feeling well overall. BH ctx unchanged. No VB or LOF. +FM. No PreE symptoms. LE edema stable.    O: BP (!) 180/100 (BP Location: Left arm, Patient Position: Sitting, Cuff Size: Adult Regular)   Pulse 80   Wt 69.9 kg (154 lb)   LMP 2023   BMI 30.08 kg/m    Gen: Well-appearing, NAD  See OB Flowsheet    A/P:  Nato Bradley is a 39 year old  at 38w3d by IVF dating, here for return OB visit.   IVF, surrogate pregnancy  - normal level 2 US  - fetal echo normal  - Growth US 3/14/2024 EFW  3509g (92%ile).   - weekly BPP starting at 36 weeks  - reviewed extensive birth plan 3/14/2024, intended parents plan to revise based on today's conversation and send update. Reviewed updated birth plan 3/28/2024- discussed a few persistent elements that are not standard practice (intended mother requesting no postpartum pitocin, which patient is not in agreement with). Discussed that best practice to provide safe care is what will be followed.     Low-lying, anterior placenta overlying prior  section scar: resolved at 32 weeks     Hx of c/s x1: plans  again, now planning delivery at [facility]. Care coordination involved to assist with legal paperwork     Hx of genital HSV: no outbreaks in years, plan acyclovir at 36 weeks     PNC: Rh positive, Rubella immune, , passed GTT  Genetics: normal aneuploidy screening  Imaging: dating US at 7w0d  Immunizations: none. Declines Tdap    To [facility] For BP monitoring, possible proceeding with delivery.    Peri Henry MD FACOG  OB/GYN  2024 9:32 AM

## 2024-04-23 NOTE — L&D DELIVERY NOTE
OB Vaginal Delivery Note    Nato Bradley MRN# [id number]   Age: 39 year old YOB: 1985       GA: 38w3d  GP:   Labor Complications: None   EBL:   mL  Delivery QBL:    Delivery Type: , Spontaneous   ROM to Delivery Time: Hours: 5 Minutes: 50  [place] Weight:     1 Minute 5 Minute 10 Minute   Apgar Totals:                 Delivery Details:  Nato Bradley, a 39 year old  who was admitted at 38w3d for IOL for Preeclampsia without severe features. This pregnancy was complicated by surrogate pregnancy and prior  section x1 with two subsequent successful VBACs. Upon admission, she was 3-4 cm. She underwent AROM and was started on pitocin. She received an epidural for labor anesthesia. She progressed to complete dilation over the next 4.5 hours. She pushed effectively for 7 minutes and delivered a vigorous male infant, OBED position, without complication. Nuchal cord reduced after delivery of the fetal head. Infant was placed in the arms of his mother, Amy, for delayed cord clamping. Placenta delivered spontaneously and appeared intact. No lacerations.    Peri Henry MD FACOG  OB/GYN  2024 6:17 PM         Kirk, Pending Baby Nato [[id number]]      Labor Event Times      Active labor onset date: 24 Onset time:  3:33 PM   Dilation complete date: 24           Labor Events     labor?: No   steroids: None  Labor Type: Induction/Cervical ripening  Predominate monitoring during 1st stage: continuous electronic fetal monitoring     Antibiotics received during labor?: No       Rupture date/time: 24 1222   Rupture type: Artificial Rupture of Membranes  Fluid color: Clear  Fluid odor: Normal       Induction date/time: 24 1336    Cervical ripening date/time:      Indications for induction: Mild Preeclampsia     Augmentation: Oxytocin, AROM       Delivery/Placenta Date and Time    Delivering clinician: Peri Henry, [facility]               Vaginal Counts       Initial count performed by 2 team members:  Two Team Members   Manuel Ferrara RN         [place] Suture Needles Sponges (RETIRED) Instruments   Initial counts 1  6    Added to count       Relief counts       Final counts               Placed during labor Accounted for at the end of labor   FSE NA    IUPC NA    Cervidil NA                              Cord      Cord Complications: Nuchal   Nuchal Intervention: reduced         Nuchal cord description: loose nuchal cord         Stem cell collection?: No           Labor Events and Shoulder Dystocia    Fetal Tracing Prior to Delivery: Category 1  Shoulder dystocia present?: Neg       Delivery (Maternal) (Provider to Complete) ([id number])    Episiotomy: None  Perineal lacerations: None    Repair suture: None  Genital tract inspection done: Pos       Blood Loss  Mother: Nato Bradley #[id number]     Start of Mother's Information      Delivery Blood Loss  24 1533 - 24 1812      None                 End of Mother's Information  Mother: Nato Bradley #[id number]                Delivery - Provider to Complete ([id number])    Delivering clinician: Peri Henry [facility]  Delivery Type (Choose the 1 that will go to the Birth History): , Spontaneous                                           Placenta    Removal: Spontaneous  Disposition: Hospital disposal             Anesthesia    Method: Epidural  Cervical dilation at placement: 0-3                    Presentation and Position    Presentation: Vertex    Position: Left Occiput Anterior                     Peri Henry MD

## 2024-04-23 NOTE — ANESTHESIA PROCEDURE NOTES
"Epidural catheter Procedure Note    Pre-Procedure   Staff -        CRNA: Carmen Mosley APRN CRNA       Performed By: CRNA       Location: OB       Pre-Anesthestic Checklist: patient identified, IV checked, risks and benefits discussed, informed consent, monitors and equipment checked, pre-op evaluation, at physician/surgeon's request and post-op pain management  Timeout:       Correct Patient: Yes        Correct Procedure: Yes        Correct Site: Yes        Correct Position: Yes   Procedure Documentation  Procedure: epidural catheter       Diagnosis: labor epidural       Patient Position: sitting       Skin prep: Chloraprep       Local skin infiltrated with mL of 1% lidocaine.        Insertion Site: L3-4. (midline approach).       Technique: LORT saline        BELLA at 5 cm.       Needle Type: ToBioDatay needle       Needle Gauge: 17.        Needle Length (Inches): 3.5        Catheter: 19 G.          Catheter threaded easily.           Threaded 12 cm at skin.         # of attempts: 1 and  # of redirects:  0    Assessment/Narrative         Paresthesias: No.       Test dose of mL lidocaine 1.5% w/ 1:200,000 epinephrine at.         Test dose negative, 3 minutes after injection, for signs of intravascular, subdural, or intrathecal injection.       Insertion/Infusion Method: LORT saline       Aspiration negative for Heme or CSF via Epidural Catheter.      FOR [facility] ([facility]) ONLY:   Pain Team Contact information: please page the Pain Team Via [employer]. Search \"Pain\". During daytime hours, please page the attending first. At night please page the resident first.      "

## 2024-04-23 NOTE — H&P
"[facility] Labor and Delivery History and Physical    Nato Bradley MRN# [id number]   Age: 39 year old YOB: 1985     Date of Admission:  2024    Primary care provider: Alexandrea Cisneros           Chief Complaint:   Nato Bradley is a 39 year old  at 38w3d by IVF dating, surrogate admitted for PreEclampsia without severe features. She had a severe BP in clinic today that improved to normal to mild range while being observed on [facility]. However, her UPC also is elevated.           Pregnancy history:     OBSTETRIC HISTORY:    OB History    Para Term  AB Living   8 5 5 0 2 5   SAB IAB Ectopic Multiple Live Births   0 1 1 0 5      # Outcome Date GA Lbr Jovi/2nd Weight Sex Type Anes PTL Lv   8 Current            7 Term 20 38w1d  3.042 kg (6 lb 11.3 oz) F    MT      Birth Comments: Surrogate   6 Term 16 39w0d  3.345 kg (7 lb 6 oz) F Vag-Spont EPI N MT      Name: Clari      Apgar1: 9  Apgar5: 9   5 Term 14 38w0d  2.92 kg (6 lb 7 oz) M CS-LTranv EPI N MT      Complications: Breech presentation      Name: Jerome   4 Term 10/28/11 38w0d  2.977 kg (6 lb 9 oz) M Vag-Spont EPI N MT      Name: Jim   3 Ectopic 07           2 Term 03 37w0d  3.033 kg (6 lb 11 oz) M Vag-Spont EPI Y MT      Name: sebastian jeronimo   1 IAB                EDC: Estimated Date of Delivery: May 4, 2024    Prenatal Labs:   Lab Results   Component Value Date    ABO O 2020    RH Pos 2020    AS Negative 2024    HEPBANG Nonreactive 10/18/2023    RPR Non-Reactive 2016    HGB 10.0 (L) 2024       GBS Status:   No results found for: \"GBS\"    Active Problem List  Patient Active Problem List   Diagnosis    Depressive disorder    Anxiety and depression    Encounter for triage in pregnant patient    Normal labor    Costochondritis       Medication Prior to Admission  Medications Prior to Admission   Medication Sig Dispense " Refill Last Dose    Prenatal Vit-Fe Fumarate-FA (PNV PRENATAL PLUS MULTIVITAMIN) 27-1 MG TABS per tablet Take 1 tablet by mouth daily   2024    valACYclovir (VALTREX) 500 MG tablet Take 1 tablet (500 mg) by mouth daily 30 tablet 0 2024    albuterol (PROAIR HFA/PROVENTIL HFA/VENTOLIN HFA) 108 (90 Base) MCG/ACT inhaler Inhale 2 puffs into the lungs every 4 hours as needed for shortness of breath / dyspnea or wheezing (Patient not taking: Reported on 2023) 18 g 0     estradiol (ESTRACE) 2 MG tablet TAKE 2 TABLETS ORALLY TWICE DAILY. (Patient not taking: Reported on 2023)       ketoconazole (NIZORAL) 2 % external shampoo Apply topically daily as needed       metroNIDAZOLE (METROCREAM) 0.75 % external cream Apply topically 2 times daily       progesterone (PROMETRIUM) 200 MG capsule Take one (1) tablet vaginally twice daily as directed (Patient not taking: Reported on 2023)       triamcinolone (KENALOG) 0.1 % external lotion Apply topically 3 times daily (Patient not taking: Reported on 2023) 60 mL 0    .        Maternal Past Medical History:     Past Medical History:   Diagnosis Date    2019 novel coronavirus disease (COVID-19) 2021    Depressive disorder     Genital herpes     History of human papillomavirus infection     Personal history of other mental and behavioral disorders     No Comments Provided    Urinary tract infection      Past Surgical History:   Procedure Laterality Date    C/SECTION, LOW TRANSVERSE       SECTION      No Comments Provided    EXTRACTION(S) DENTAL      No Comments Provided                       Family History:     Family History   Problem Relation Age of Onset    Diabetes Mother         Diabetes    Hypertension Mother         Hypertension    Thyroid Disease Mother         Thyroid Disease    Thyroid Disease Sister         hashimoto's (dx 2018)    Heart Disease Maternal Grandmother         Heart Disease    Hypertension Maternal Grandmother          Hypertension    Lung Cancer Maternal Grandmother         ? cervical cancer in 20s.     Heart Disease Maternal Grandfather     Prostate Cancer Maternal Grandfather         Cancer-prostate    Diabetes Maternal Grandfather         Diabetes    Hypertension Maternal Grandfather         Hypertension    Hypertension Maternal Aunt         Hypertension    Thyroid Disease Maternal Aunt         Thyroid Disease    Hypertension Paternal Aunt         Hypertension    Breast Cancer No family hx of         Cancer-breast    Colon Cancer No family hx of         Cancer-colon    Ovarian Cancer No family hx of         Cancer-ovarian    Other - See Comments No family hx of         Stroke    Blood Disease No family hx of         Blood Disease    Breast Cancer Maternal Aunt     Cancer Maternal Grandfather      Family history reviewed and updated in [facility]            Social History:     Social History     Tobacco Use    Smoking status: Never    Smokeless tobacco: Never   Substance Use Topics    Alcohol use: Not Currently     Alcohol/week: 0.0 standard drinks of alcohol     Comment: occasional            Review of Systems:   The Review of Systems is negative other than noted in the HPI          Physical Exam:   Patient Vitals for the past 8 hrs:   BP Temp Temp src Pulse Resp SpO2   24 1200 (!) 141/85 -- -- -- -- --   24 1120 123/85 -- -- -- -- --   24 1105 127/85 -- -- -- -- --   24 1050 127/84 -- -- -- -- --   24 1035 125/84 -- -- -- -- --   24 1029 -- 98.7  F (37.1  C) Temporal 76 18 98 %   24 1021 (!) 151/88 -- -- -- -- --   24 1003 -- -- -- -- -- 99 %   24 1001 (!) 141/87 97.9  F (36.6  C) -- -- -- --     Gen: resting in bed, NAD  CV: RRR  Resp: CTAB  Abd: gravid, soft, nontender  Ext: nontender, 2+ edema    Cervix: 3-4/80/-2  Membranes: AROM- clear  EFW: 8.75 lbs  Presentation:Cephalic    Fetal Heart Rate Tracin, mod variability, + accels, no decels  Tocometer: 1-2 ctx in 10  min        Assessment:   Nato Bradley is a 39 year old  at 38w3d admitted with PreE without severe features. Discussed recommendation to proceed with delivery given her gestational age, which she and the couple agree to. They understand our facility resources in the setting of . Will plan for 2 IV sites.           Plan:   FWB: Cat I, reactive. EFW 8.75 lbs  Labor: AROM and pitocin  Pain: plans epidural  Rh positive, RI, GBS negative  Anticipate     Peri Henry MD

## 2024-04-23 NOTE — PROGRESS NOTES
Intrapartum Progress Note    S: Patient is comfortable with an epidural.     O: /67   Pulse 76   Temp 97.2  F (36.2  C) (Oral)   Resp 18   LMP 2023   SpO2 98%   Breastfeeding No    Gen: resting in bed, NAD  Cvx: 9/90/0    FHT: 135, mod variability, + accels, no decels  [place]: 4 contractions in 10 min    Membranes: s/p AROM, clear    A/P: 39 year old  at 38w3d by IVF dating admitted for IOL for PreEclampsia without severe features  IVF, Surrogate pregnancy: Amy and Ace at bedside    PreEclampsia without severe features  - BPs normal to mild range  - asymptomatic    FWB: Cat I, reactive. EFW 8.75 lbs  Labor: s/p AROM, on pitocin at 2 mu/min   Pain: epidural  GBS: negative  Rh: positive  Rubella: immune  Continue routine labor management.   Anticipate     Peri Henry MD 5:00 PM

## 2024-04-23 NOTE — PLAN OF CARE
Resting in bed, plan of care discussed. Offers no complaints at this time. See flowsheet for further info.

## 2024-04-23 NOTE — PROGRESS NOTES
Patient to [facility] from the clinic for BP monitoring after two higher BP's. To Room 404. This pregnancy is a surrogacy pregnancy and parents are both present. Patient oriented to room and nurse call light. EUM/EFM placed and serial BP's initiated.

## 2024-04-24 LAB
HGB BLD-MCNC: 9.2 G/DL (ref 11.7–15.7)
HOLD SPECIMEN: NORMAL
T PALLIDUM AB SER QL: NONREACTIVE

## 2024-04-24 PROCEDURE — 250N000013 HC RX MED GY IP 250 OP 250 PS 637: Performed by: OBSTETRICS & GYNECOLOGY

## 2024-04-24 PROCEDURE — 120N000001 HC R&B MED SURG/OB

## 2024-04-24 PROCEDURE — 36415 COLL VENOUS BLD VENIPUNCTURE: CPT | Performed by: OBSTETRICS & GYNECOLOGY

## 2024-04-24 PROCEDURE — 85018 HEMOGLOBIN: CPT | Performed by: OBSTETRICS & GYNECOLOGY

## 2024-04-24 RX ADMIN — IBUPROFEN 800 MG: 400 TABLET, FILM COATED ORAL at 11:10

## 2024-04-24 RX ADMIN — IBUPROFEN 800 MG: 400 TABLET, FILM COATED ORAL at 04:50

## 2024-04-24 RX ADMIN — IBUPROFEN 800 MG: 400 TABLET, FILM COATED ORAL at 17:02

## 2024-04-24 RX ADMIN — DOCUSATE SODIUM 100 MG: 100 CAPSULE, LIQUID FILLED ORAL at 17:03

## 2024-04-24 ASSESSMENT — ACTIVITIES OF DAILY LIVING (ADL)
ADLS_ACUITY_SCORE: 19
ADLS_ACUITY_SCORE: 19
ADLS_ACUITY_SCORE: 18
ADLS_ACUITY_SCORE: 19

## 2024-04-24 NOTE — PROGRESS NOTES
OB/GYN Postpartum Note      S:  Patient is feeling well this morning.  Lochia = menses.  Eating and drinking without nausea.  Ambulating without difficulty.  Pain is well controlled.   O:   Vitals:    24 0005 24 0440 24 0505   BP: (!) 148/88 117/67 131/72    BP Location: Left arm Left arm Left arm    Patient Position: Semi-Delaney's  Semi-Delaney's    Cuff Size: Adult Regular Adult Regular Adult Regular    Pulse:       Resp: 14     Temp:  97.3  F (36.3  C) (!) 96.3  F (35.7  C)    TempSrc:  Temporal Temporal    SpO2:  97%     Weight:    66.3 kg (146 lb 1.6 oz)     General: resting in bed, in NAD  Resp: nonlabored  Abdomen: soft, nontender, nondistended  Fundus firm at umbilicus  Extremities: 2+ edema in BLE    Hemoglobin   Date Value Ref Range Status   2024 9.2 (L) 11.7 - 15.7 g/dL Final   2021 13.9 11.7 - 15.7 g/dL Final   ]    A: Ms. Nato Bradley is a 39 year old  PPD #1 s/p     P:  PreEclampsia w/o SF:  - BPs mild range since delivery    Heme 10.0 >  > 9.2, asymptomatic acute blood loss anemia. Will need iron at discharge  GI: tolerating regular diet  Feeding: pumping  Contraception: s/p sterilization  Rubella Immune  Rh positive  Disposition: routine cares, likely discharge tomorrow pending BP control    Peri Henry MD FACOG  OB/GYN  2024 8:08 AM

## 2024-04-24 NOTE — PROGRESS NOTES
of a living Male, babe to Mom Amy's chest for bonding. Patient tolerated delivery well, VSS. fundus firm 2/U, moderate lochia noted no clots.

## 2024-04-24 NOTE — ANESTHESIA POSTPROCEDURE EVALUATION
Patient: Nato Bradley    Procedure: * No procedures listed *       Anesthesia Type:  Epidural    Note:  Disposition: Inpatient   Postop Pain Control: Uneventful            Sign Out: Well controlled pain   PONV: No   Neuro/Psych: Uneventful            Sign Out: Acceptable/Baseline neuro status   Airway/Respiratory: Uneventful            Sign Out: Acceptable/Baseline resp. status   CV/Hemodynamics: Uneventful            Sign Out: Acceptable CV status; No obvious hypovolemia; No obvious fluid overload   Other NRE: NONE   DID A NON-ROUTINE EVENT OCCUR?            Last vitals:  Vitals:    04/24/24 0005 04/24/24 0440 04/24/24 0825   BP: 117/67 131/72 136/82   Pulse:      Resp: 12 12 16   Temp: 97.3  F (36.3  C) (!) 96.3  F (35.7  C) 97.3  F (36.3  C)   SpO2: 97%  97%       Electronically Signed By: LEIF Gonzales CRNA  April 24, 2024  11:21 AM

## 2024-04-24 NOTE — PLAN OF CARE
"Patient reporting pain at \"1-4.\" PRN Ibuprofen and PRN Acetaminophen given as ordered. Ice packs, witch hazel pads, and love bottle used perineal comfort. Elevated untreatable blood pressures; Dr. FADIA Henry notified via phone per order. Denies preeclampsia symptoms. Reflexes normal. Clonus absent. Intake and output monitored (reference flowsheet). Fundus: firm, 2 cm above umbilicus. Lochia: light to moderate amount, rubra. Voiding spontaneously without difficulty. Due to have postpartum BM. Scheduled colace given as ordered. Up ad aliza; independent in hygiene cares. Tolerating regular diet. Adequate PO intake. Patient requested left wrist IV to be removed due to discomfort; right hand IV remains in place and is patent.     Temp: (!) 96.3  F (35.7  C) Temp src: Temporal BP: 131/72 Pulse: 76   Resp: 12 SpO2: 97 % O2 Device: None (Room air)      Daily Weight: 146 lbs 1.6 oz        "

## 2024-04-24 NOTE — PLAN OF CARE
Assessment done, VSS. See flowsheet for further info. Offers no complaints at this time. Will continue to monitor.

## 2024-04-24 NOTE — LACTATION NOTE
INPATIENT LACTATION CONSULT      Consult with Nato regarding pumping. This is Nato's 6th pregnancy and she is very knowledgeable with pumping techniques, frequency, and storage. Information provided on correct pump flange size. Nato has a double electric Medela pump at home. Nato states she understands all information given.    Gaby Whitley RN, IBCLC  Lactation Consultant  [facility] and [facility]

## 2024-04-24 NOTE — ANESTHESIA CARE TRANSFER NOTE
Patient: Nato Bradley    Procedure: * No procedures listed *       Diagnosis: * No pre-op diagnosis entered *  Diagnosis Additional Information: No value filed.    Anesthesia Type:   Epidural     Note:    Oropharynx: oropharynx clear of all foreign objects  Level of Consciousness: awake  Oxygen Supplementation: room air    Independent Airway: airway patency satisfactory and stable  Dentition: dentition unchanged  Vital Signs Stable: post-procedure vital signs reviewed and stable  Report to RN Given: handoff report given  Patient transferred to: Labor and Delivery    Handoff Report: Identifed the Patient, Identified the Reponsible Provider, Reviewed the pertinent medical history, Discussed the surgical course, Reviewed Intra-OP anesthesia mangement and issues during anesthesia, Set expectations for post-procedure period and Allowed opportunity for questions and acknowledgement of understanding      Vitals:  Vitals Value Taken Time   BP     Temp     Pulse     Resp     SpO2         Electronically Signed By: LEIF Gonzales CRNA  April 24, 2024  11:21 AM

## 2024-04-25 ENCOUNTER — ANESTHESIA (OUTPATIENT)
Dept: OBGYN | Facility: OTHER | Age: 39
End: 2024-04-25
Payer: COMMERCIAL

## 2024-04-25 ENCOUNTER — ANESTHESIA EVENT (OUTPATIENT)
Dept: OBGYN | Facility: OTHER | Age: 39
End: 2024-04-25
Payer: COMMERCIAL

## 2024-04-25 ENCOUNTER — CARE COORDINATION (OUTPATIENT)
Dept: FAMILY MEDICINE | Facility: OTHER | Age: 39
End: 2024-04-25

## 2024-04-25 PROCEDURE — 36410 VNPNXR 3YR/> PHY/QHP DX/THER: CPT | Performed by: NURSE ANESTHETIST, CERTIFIED REGISTERED

## 2024-04-25 PROCEDURE — 250N000013 HC RX MED GY IP 250 OP 250 PS 637: Performed by: OBSTETRICS & GYNECOLOGY

## 2024-04-25 PROCEDURE — 120N000001 HC R&B MED SURG/OB

## 2024-04-25 RX ORDER — NIFEDIPINE 30 MG/1
30 TABLET, EXTENDED RELEASE ORAL DAILY
Status: DISCONTINUED | OUTPATIENT
Start: 2024-04-25 | End: 2024-04-26 | Stop reason: DRUGHIGH

## 2024-04-25 RX ORDER — NIFEDIPINE 30 MG/1
30 TABLET, EXTENDED RELEASE ORAL ONCE
Status: COMPLETED | OUTPATIENT
Start: 2024-04-25 | End: 2024-04-25

## 2024-04-25 RX ORDER — NIFEDIPINE 10 MG/1
10 CAPSULE ORAL ONCE
Status: COMPLETED | OUTPATIENT
Start: 2024-04-25 | End: 2024-04-25

## 2024-04-25 RX ADMIN — NIFEDIPINE 30 MG: 30 TABLET, EXTENDED RELEASE ORAL at 22:19

## 2024-04-25 RX ADMIN — IBUPROFEN 800 MG: 400 TABLET, FILM COATED ORAL at 01:35

## 2024-04-25 RX ADMIN — NIFEDIPINE 10 MG: 10 CAPSULE ORAL at 11:28

## 2024-04-25 RX ADMIN — DOCUSATE SODIUM 100 MG: 100 CAPSULE, LIQUID FILLED ORAL at 10:12

## 2024-04-25 RX ADMIN — NIFEDIPINE 30 MG: 30 TABLET, EXTENDED RELEASE ORAL at 17:40

## 2024-04-25 ASSESSMENT — ACTIVITIES OF DAILY LIVING (ADL)
ADLS_ACUITY_SCORE: 18

## 2024-04-25 NOTE — PROGRESS NOTES
"Patient states is feeling \"different\". Blood pressure check at 1040 was 147/86. Patient complained of still feeling \"off\". Blood pressure rechecked at 1100 and was 163/85. Checked again at 1115. Blood pressure now at 174/85. Dr. Angeles Henry notified. Orders received.  "

## 2024-04-25 NOTE — PROGRESS NOTES
Reviewed Surrogate/gestational carrier birth legal documents.  Scanned and intended to send emails to Risk Management, Marycruz Foy. Will send once confirmed.     Initiated face to face with intended parents, Amy and Ace Vela. Met baby and congratulated them both. They plan to leave today to fly to their home in [place]. No concerns expressed at this time.     Plan: Ongoing Care Coordination for Surrogacy/Surrogate and Intended Parents to assess and offer assist as needed with arrangement, legal, and/or other community resources/referrals; ongoing support/encouragement as needed.     DERIAN CARNES on 4/25/2024 at 5:11 PM

## 2024-04-25 NOTE — DISCHARGE SUMMARY
VAGINAL DELIVERY DISCHARGE SUMMARY    Admit date: 2024  Discharge date: 2024     Admit Dx:   - 39 year old  at 38w3d    - PreEclampsia without severe features  - prior  section, desires TOLAC  - surrogate pregnancy    Discharge Dx:  - Same as above, s/p     Procedures:  - vaginal birth after  section  - Epidural analgesia    Admit HPI:  Ms. Nato Bradley is a   at 38w3d  who was admitted for IOL for PreEclampsia without severe features on 2024. Please see her admit H&P for full details of her PMH, PSH, Meds, Allergies and exam on admit.    Hospital course:  Nato Bradley was admitted to the hospital on 2024 for the above listed indications. Upon admission, she was 3-4 cm. She underwent AROM and was started on pitocin. She received an epidural for labor anesthesia. She progressed to complete dilation over the next 4.5 hours. She pushed effectively for 7 minutes and delivered a vigorous male infant, OBED position, without complication. Nuchal cord reduced after delivery of the fetal head. Infant was placed in the arms of his mother, Amy, for delayed cord clamping. Apgars 8/9, weight 4023g. Placenta delivered spontaneously and appeared intact. No lacerations. Please see her Delivery Summary for full details regarding her delivery.    Her postpartum course was complicated by asymptomatic acute blood loss anemia. Her BPs were initially mild range but improved to normal within 12 hours of delivery.  On PPD#2, she was meeting all of her postpartum goals and deemed stable for discharge. She was voiding without difficulty, tolerating a regular diet without nausea and vomiting, her pain was well controlled on oral pain medicines and her lochia was appropriate. Her hemoglobin prior to delivery was 10.0 and after delivery was 9.2, planned to  OTC iron supplement. Her Rh status was positive, and Rhogam was not indicated.     Discharge Medications:  Current  Discharge Medication List        CONTINUE these medications which have NOT CHANGED    Details   Prenatal Vit-Fe Fumarate-FA (PNV PRENATAL PLUS MULTIVITAMIN) 27-1 MG TABS per tablet Take 1 tablet by mouth daily      valACYclovir (VALTREX) 500 MG tablet Take 1 tablet (500 mg) by mouth daily  Qty: 30 tablet, Refills: 0    Associated Diagnoses: HSV (herpes simplex virus) infection      ketoconazole (NIZORAL) 2 % external shampoo Apply topically daily as needed      metroNIDAZOLE (METROCREAM) 0.75 % external cream Apply topically 2 times daily           STOP taking these medications       albuterol (PROAIR HFA/PROVENTIL HFA/VENTOLIN HFA) 108 (90 Base) MCG/ACT inhaler Comments:   Reason for Stopping:         estradiol (ESTRACE) 2 MG tablet Comments:   Reason for Stopping:         progesterone (PROMETRIUM) 200 MG capsule Comments:   Reason for Stopping:         triamcinolone (KENALOG) 0.1 % external lotion Comments:   Reason for Stopping:               Discharge/Disposition:  Nato Bradley was discharged to home in stable condition with the following instructions/medications:  1) Call for temperature > 100.4, bright red vaginal bleeding >1 pad an hour x 2 hours, foul smelling vaginal discharge, pain not controlled by usual oral pain meds, persistent nausea and vomiting not controlled on medications  2) she uses vasectomy for contraception  3) Pumping for surrogate family.  4) She was instructed to follow-up with Dr Henry in 6 weeks for a routine postpartum visit, will send BP list next week.      Peri Henry MD  OBGYN  4/25/2024 8:22 AM

## 2024-04-25 NOTE — PLAN OF CARE
"Patient reporting pain abdominal and perineum pain at \"2\" PRN Ibuprofen given once as ordered. Witch hazel pads and love bottle used for perineal comfort. Reflexes normal. Clonus absent. Intake and output monitored (reference flowsheet). Fundus: firm, 1 cm above umbilicus. Lochia: light amount, rubra. Voiding spontaneously without difficulty. Due to have postpartum BM. Up ad aliza; independent in hygiene cares. Tolerating regular diet. Adequate PO intake. Patient requested to have IV removed. Utilizing electric double breast pump, hand pump, and hand expression to express breastmilk.     Temp: (!) 96.4  F (35.8  C) Temp src: Temporal BP: 139/81     Resp: 14 SpO2: 99 % O2 Device: None (Room air)      Daily Weight: 146 lbs 6.4 oz        "

## 2024-04-25 NOTE — PROGRESS NOTES
Written instructions and demonstration provided on home blood pressure monitor. Patient states she understands all information provided.

## 2024-04-25 NOTE — PROGRESS NOTES
OB/GYN Postpartum Note      S:  Patient is feeling well this AM.  Lochia = menses.  Eating and drinking without nausea.  Ambulating without difficulty.  Pain is well controlled.  Pumping       O:   Vitals:    24 0130 24 0625 24 0645 24 0730   BP: 139/81 134/78  134/79   BP Location: Left arm Left arm  Left arm   Patient Position: Semi-Delaney's   Semi-Delaney's   Cuff Size: Adult Regular Adult Regular  Adult Regular   Pulse:    67   Resp: 14   16   Temp: (!) 96.4  F (35.8  C)   97.6  F (36.4  C)   TempSrc: Temporal   Oral   SpO2: 99%   98%   Weight:   66.4 kg (146 lb 6.4 oz)      General: resting in bed, in NAD  Resp: nonlabored  Abdomen: deferred- pumping  Extremities: 2+ edema in BLE    Hemoglobin   Date Value Ref Range Status   2024 9.2 (L) 11.7 - 15.7 g/dL Final   2021 13.9 11.7 - 15.7 g/dL Final   ]    A: Ms. Nato Bradley is a 39 year old  PPD #2 s/p     P:  PreEclampsia w/o SF:  - BPs primarily normal the last 24 hrs, asymptomatic.      Heme 10.0 >  > 9.2, asymptomatic acute blood loss anemia. Will need iron at discharge- plans to  OTC  GI: tolerating regular diet  Feeding: pumping  Contraception: s/p vasectomy  Rubella Immune  Rh positive  Disposition: routine cares, discharge today    Peri Henry MD FACOG  OB/GYN  2024 8:20 AM

## 2024-04-26 VITALS
RESPIRATION RATE: 18 BRPM | TEMPERATURE: 97 F | HEIGHT: 60 IN | OXYGEN SATURATION: 100 % | HEART RATE: 59 BPM | DIASTOLIC BLOOD PRESSURE: 71 MMHG | WEIGHT: 146 LBS | SYSTOLIC BLOOD PRESSURE: 117 MMHG | BODY MASS INDEX: 28.66 KG/M2

## 2024-04-26 PROCEDURE — 250N000011 HC RX IP 250 OP 636: Mod: JZ | Performed by: OBSTETRICS & GYNECOLOGY

## 2024-04-26 PROCEDURE — 250N000013 HC RX MED GY IP 250 OP 250 PS 637: Performed by: OBSTETRICS & GYNECOLOGY

## 2024-04-26 RX ORDER — POLYETHYLENE GLYCOL 3350 17 G/17G
17 POWDER, FOR SOLUTION ORAL DAILY
Status: DISCONTINUED | OUTPATIENT
Start: 2024-04-26 | End: 2024-04-26 | Stop reason: HOSPADM

## 2024-04-26 RX ORDER — NIFEDIPINE 60 MG/1
60 TABLET, EXTENDED RELEASE ORAL DAILY
Qty: 30 TABLET | Refills: 1 | Status: SHIPPED | OUTPATIENT
Start: 2024-04-26 | End: 2024-05-14 | Stop reason: DRUGHIGH

## 2024-04-26 RX ORDER — NIFEDIPINE 30 MG/1
60 TABLET, EXTENDED RELEASE ORAL ONCE
Status: COMPLETED | OUTPATIENT
Start: 2024-04-26 | End: 2024-04-26

## 2024-04-26 RX ADMIN — NIFEDIPINE 60 MG: 30 TABLET, EXTENDED RELEASE ORAL at 15:22

## 2024-04-26 RX ADMIN — POLYETHYLENE GLYCOL 3350 17 G: 17 POWDER, FOR SOLUTION ORAL at 10:09

## 2024-04-26 RX ADMIN — ACETAMINOPHEN 650 MG: 325 TABLET, FILM COATED ORAL at 09:19

## 2024-04-26 RX ADMIN — DOCUSATE SODIUM 100 MG: 100 CAPSULE, LIQUID FILLED ORAL at 09:19

## 2024-04-26 RX ADMIN — IBUPROFEN 800 MG: 400 TABLET, FILM COATED ORAL at 06:47

## 2024-04-26 RX ADMIN — LABETALOL HYDROCHLORIDE 20 MG: 5 INJECTION, SOLUTION INTRAVENOUS at 00:18

## 2024-04-26 ASSESSMENT — ACTIVITIES OF DAILY LIVING (ADL)
ADLS_ACUITY_SCORE: 18

## 2024-04-26 NOTE — PROGRESS NOTES
Discharge Note      Data:  Nato Bradley discharged to home at 1535 via ambulation. Accompanied by spouse and staff.    Action:  Written discharge/follow-up instructions were provided to patient and spouse. Prescriptions sent with patient to fill . All belongings sent with patient.    Response:  Nato verbalized understanding of discharge instructions, reason for discharge, and necessary follow-up appointments. Without further questions or concerns at this time.     Jonathan Thomas RN 04/26/24 3:41 PM

## 2024-04-26 NOTE — PLAN OF CARE
"Goal Outcome Evaluation:    BP:  0800: 109/61  0900: 135/79  1000: 110/67  1100: 118/64  1200: 116/70  1300: 113/66  1400: 117/74    Reports mild \"annoying headache\" that comes and goes. Has had this over the last day. Reports that she slept on her neck funny. Otherwise denies visual disturbances/changes, and RUQ pain. Does still have +2-+3 edema of the lower extremities. Reports that this has been improving. Reflexes normal. Denies SOB. Ambulating independently. Strict I & O's. Adequate amounts of urine output thus far.     Called Dr. Posadas with update and he reports he will round on patient in 10 minutes.    Jonathan Thomas RN 04/26/24 2:43 PM          "

## 2024-04-26 NOTE — DISCHARGE SUMMARY
Hospital Discharge Summary    Nato Bradley MRN# [id number]   Age: 39 year old YOB: 1985     Date of Admission:  2024  Date of Discharge::  2024  Admitting Physician:  Peri Henry MD  Discharge Physician:  Onel Posadas MD     Home clinic: [facility]          Admission Diagnoses:   Preeclampsia          Discharge Diagnosis:     Normal spontaneous vaginal delivery  Intrauterine pregnancy at term  Postpartum HTN        Procedures:     Procedure(s):        Extended monitoring and treatment for HTN           Medications Prior to Admission:     Medications Prior to Admission   Medication Sig Dispense Refill Last Dose    Prenatal Vit-Fe Fumarate-FA (PNV PRENATAL PLUS MULTIVITAMIN) 27-1 MG TABS per tablet Take 1 tablet by mouth daily   2024    valACYclovir (VALTREX) 500 MG tablet Take 1 tablet (500 mg) by mouth daily 30 tablet 0 2024    ketoconazole (NIZORAL) 2 % external shampoo Apply topically daily as needed       metroNIDAZOLE (METROCREAM) 0.75 % external cream Apply topically 2 times daily       [DISCONTINUED] albuterol (PROAIR HFA/PROVENTIL HFA/VENTOLIN HFA) 108 (90 Base) MCG/ACT inhaler Inhale 2 puffs into the lungs every 4 hours as needed for shortness of breath / dyspnea or wheezing (Patient not taking: Reported on 2023) 18 g 0     [DISCONTINUED] estradiol (ESTRACE) 2 MG tablet TAKE 2 TABLETS ORALLY TWICE DAILY. (Patient not taking: Reported on 2023)       [DISCONTINUED] progesterone (PROMETRIUM) 200 MG capsule Take one (1) tablet vaginally twice daily as directed (Patient not taking: Reported on 2023)       [DISCONTINUED] triamcinolone (KENALOG) 0.1 % external lotion Apply topically 3 times daily (Patient not taking: Reported on 2023) 60 mL 0              Discharge Medications:     Current Discharge Medication List        START taking these medications    Details   NIFEdipine ER OSMOTIC (PROCARDIA XL) 60 MG 24 hr tablet Take 1 tablet (60 mg)  by mouth daily  Qty: 30 tablet, Refills: 1    Associated Diagnoses: Postpartum hypertension           CONTINUE these medications which have NOT CHANGED    Details   Prenatal Vit-Fe Fumarate-FA (PNV PRENATAL PLUS MULTIVITAMIN) 27-1 MG TABS per tablet Take 1 tablet by mouth daily      valACYclovir (VALTREX) 500 MG tablet Take 1 tablet (500 mg) by mouth daily  Qty: 30 tablet, Refills: 0    Associated Diagnoses: HSV (herpes simplex virus) infection      ketoconazole (NIZORAL) 2 % external shampoo Apply topically daily as needed      metroNIDAZOLE (METROCREAM) 0.75 % external cream Apply topically 2 times daily           STOP taking these medications       albuterol (PROAIR HFA/PROVENTIL HFA/VENTOLIN HFA) 108 (90 Base) MCG/ACT inhaler Comments:   Reason for Stopping:         estradiol (ESTRACE) 2 MG tablet Comments:   Reason for Stopping:         progesterone (PROMETRIUM) 200 MG capsule Comments:   Reason for Stopping:         triamcinolone (KENALOG) 0.1 % external lotion Comments:   Reason for Stopping:                     Consultations:   No consultations were requested during this admission          Brief History of Labor:   See labor record           Hospital Course:   The patient's hospital course was unremarkable.  On discharge, her pain was well controlled.  Vaginal bleeding is similar to peak menstrual flow.  Voiding without difficulty.  Ambulating well and tolerating a normal diet.  No fever.  Not Breastfeeding. Infant is discharged with intended parents (surrogate pregnancy).  She was discharged on post-partum day #3.    Post-partum hemoglobin:   Hemoglobin   Date Value Ref Range Status   04/24/2024 9.2 (L) 11.7 - 15.7 g/dL Final   04/23/2021 13.9 11.7 - 15.7 g/dL Final             Discharge Instructions and Follow-Up:     Discharge diet: Regular   Discharge activity: Pelvic rest: abstain from intercourse and do not use tampons for 6 week(s)   Discharge follow-up: Follow up with primary care provider within 4  days for BP check   Wound care: Drink plenty of fluids  Ice to area for comfort  Keep wound clean and dry           Discharge Disposition:     Discharged to home      Attestation:  I have reviewed today's vital signs, notes, medications, labs and imaging.    Onel Posadas MD

## 2024-04-26 NOTE — DISCHARGE INSTRUCTIONS
Warning Signs after Having a Baby    Keep this paper on your fridge or somewhere else where you can see it.    Call your provider if you have any of these symptoms up to 12 weeks after having your baby.    Thoughts of hurting yourself or your baby  Pain in your chest or trouble breathing  Severe headache not helped by pain medicine  Eyesight concerns (blurry vision, seeing spots or flashes of light, other changes to eyesight)  Fainting, shaking or other signs of a seizure    Call 9-1-1 if you feel that it is an emergency.     The symptoms below can happen to anyone after giving birth. They can be very serious. Call your provider if you have any of these warning signs.    My provider s phone number: _______________________    Losing too much blood (hemorrhage)    Call your provider if you soak through a pad in less than an hour or pass blood clots bigger than a golf ball. These may be signs that you are bleeding too much.    Blood clots in the legs or lungs    After you give birth, your body naturally clots its blood to help prevent blood loss. Sometimes this increased clotting can happen in other areas of the body, like the legs or lungs. This can block your blood flow and be very dangerous.     Call your provider if you:  Have a red, swollen spot on the back of your leg that is warm or painful when you touch it.   Are coughing up blood.     Infection    Call your provider if you have any of these symptoms:  Fever of 100.4 F (38 C) or higher.  Pain or redness around your stitches if you had an incision.   Any yellow, white, or green fluid coming from places where you had stitches or surgery.    Mood Problems (postpartum depression)    Many people feel sad or have mood changes after having a baby. But for some people, these mood swings are worse.     Call your provider right away if you feel so anxious or nervous that you can't care for yourself or your baby.    Preeclampsia (high blood pressure)    Even if you  didn't have high blood pressure when you were pregnant, you are at risk for the high blood pressure disease called preeclampsia. This risk can last up to 12 weeks after giving birth.     Call your provider if you have:   Pain on your right side under your rib cage  Sudden swelling in the hands and face    Remember: You know your body. If something doesn't feel right, get medical help.     For informational purposes only. Not to replace the advice of your health care provider. Copyright 2020 [facility]. All rights reserved. Clinically reviewed by Gabriella Harris, RNC-OB, MSN. [employer] [id number] - Rev 02/23.

## 2024-04-26 NOTE — PROGRESS NOTES
OB Progress Note  PPD # 3 s/p , PP HTN      Pt doing well. No HA, Visual changes etc...    EXAM  /61   Pulse 59   Temp 97  F (36.1  C)   Resp 18   Wt 66.4 kg (146 lb 6.4 oz)   LMP 2023   SpO2 100%   Breastfeeding Unknown   BMI 28.59 kg/m    Gen - ND  Abd - soft, non-tender  VE - scant locia      A/P PPD # 3 s/p , with PP HTN   1. Procardia started yesterday. Pt then had increased BP and Procardia was increased to 60mg. BP continued to increase, but Procardia dose probably not fully on board yet. She did get one dose of IV labetalol  2. BP was good overnight and this morning. Plan to continue with Procardia 60mg and observe until later today. Possible discharge home later today          RENU ARZATE MD

## 2024-04-26 NOTE — PROGRESS NOTES
Notified Dr. Posadas of patient's elevated blood pressures. Patient is asymptomatic. Telephone orders to give another 30 mg ER nifedipine dose now.    Temp: 98  F (36.7  C) Temp src: Oral BP: (!) 154/85 Pulse: 67   Resp: 16 SpO2: 98 % O2 Device: None (Room air)      Juancarlos Ruano RN on 4/25/2024 at 10:12 PM

## 2024-04-26 NOTE — PROGRESS NOTES
Blood pressures have remained in the normal range since 0311. Has remained asymptomatic all night. Adequate urine output. +2/+3 dependent edema. IV is saline locked. She has been pumping every 4 hours overnight with breast milk in the freezer. Fundus firm, midline, and 1 below umbilicus.     Temp: 97.9  F (36.6  C) Temp src: Temporal BP: 107/62 Pulse: 59   Resp: 18 SpO2: 100 % O2 Device: None (Room air)      Juancarlos Ruano RN on 4/26/2024 at 6:42 AM

## 2024-04-26 NOTE — PROGRESS NOTES
Updated Dr. Posadas on patient's treatable blood pressures. Going to initiate IV labetalol protocol     Juancarlos Ruano RN

## 2024-04-29 ENCOUNTER — ALLIED HEALTH/NURSE VISIT (OUTPATIENT)
Dept: OBGYN | Facility: OTHER | Age: 39
End: 2024-04-29
Attending: OBSTETRICS & GYNECOLOGY
Payer: COMMERCIAL

## 2024-04-29 ENCOUNTER — MYC MEDICAL ADVICE (OUTPATIENT)
Dept: OBGYN | Facility: OTHER | Age: 39
End: 2024-04-29

## 2024-04-29 VITALS — SYSTOLIC BLOOD PRESSURE: 118 MMHG | HEART RATE: 70 BPM | DIASTOLIC BLOOD PRESSURE: 76 MMHG

## 2024-04-29 NOTE — PROGRESS NOTES
I met with Nato Bradley at the request of Dr. Posadas to recheck her blood pressure.  Blood pressure medications on the med list were reviewed with patient.    Patient has taken all medications as per usual regimen: Yes  Patient reports tolerating them without any issues or concerns: Yes    Vitals:    04/29/24 0906 04/29/24 0917   BP: 120/82 118/76   Pulse: 78 70       Blood pressure was taken, previous encounter was reviewed, recorded blood pressure below 140/90.  Patient was discharged and the note will be sent to the provider for final review. Writer spoke with Dr. Posadas who would like to keep patient on nifedipine 60mg daily until 6 week post partum visit. Patient instructed to call with ranges consistently under 110s/60s.    Gale Little RN on 4/29/2024 at 9:58 AM

## 2024-04-30 RX ORDER — NIFEDIPINE 30 MG/1
30 TABLET, EXTENDED RELEASE ORAL DAILY
Qty: 30 TABLET | Refills: 1 | Status: SHIPPED | OUTPATIENT
Start: 2024-04-30 | End: 2024-06-04

## 2024-04-30 NOTE — TELEPHONE ENCOUNTER
I want her to decrease to 30mg of nifedipine per day. I sent a new prescription to her pharmacy. She should send her BP log in again on Friday.    Peri Henry MD FACOG  OB/GYN  4/30/2024 12:47 PM

## 2024-05-03 ENCOUNTER — MYC MEDICAL ADVICE (OUTPATIENT)
Dept: OBGYN | Facility: OTHER | Age: 39
End: 2024-05-03
Payer: COMMERCIAL

## 2024-05-03 DIAGNOSIS — B37.89 YEAST INFECTION OF NIPPLE, POSTPARTUM: Primary | ICD-10-CM

## 2024-05-03 RX ORDER — NYSTATIN 100000 U/G
CREAM TOPICAL 4 TIMES DAILY
Qty: 30 G | Refills: 0 | Status: SHIPPED | OUTPATIENT
Start: 2024-05-03

## 2024-05-09 NOTE — NURSING NOTE
Chief Complaint   Patient presents with    Prenatal Care     36w5d       Medication Reconciliation: complete    Pt presents to clinic today for prenatal care. Pt denies any bleeding, or leakage of fluid at this time. States baby is moving good. Patient denies regular contractions.       Belkis Manzo LPN........................4/11/2024  10:07 AM   [phone]

## 2024-05-13 ENCOUNTER — MYC MEDICAL ADVICE (OUTPATIENT)
Dept: OBGYN | Facility: OTHER | Age: 39
End: 2024-05-13
Payer: COMMERCIAL

## 2024-05-14 ENCOUNTER — OFFICE VISIT (OUTPATIENT)
Dept: OBGYN | Facility: OTHER | Age: 39
End: 2024-05-14
Attending: OBSTETRICS & GYNECOLOGY
Payer: COMMERCIAL

## 2024-05-14 VITALS
DIASTOLIC BLOOD PRESSURE: 78 MMHG | BODY MASS INDEX: 25.39 KG/M2 | WEIGHT: 130 LBS | HEART RATE: 68 BPM | SYSTOLIC BLOOD PRESSURE: 132 MMHG | TEMPERATURE: 98.5 F

## 2024-05-14 DIAGNOSIS — B37.89 YEAST INFECTION OF NIPPLE, POSTPARTUM: Primary | ICD-10-CM

## 2024-05-14 PROCEDURE — 99024 POSTOP FOLLOW-UP VISIT: CPT | Performed by: OBSTETRICS & GYNECOLOGY

## 2024-05-14 PROCEDURE — 99213 OFFICE O/P EST LOW 20 MIN: CPT | Mod: 24 | Performed by: OBSTETRICS & GYNECOLOGY

## 2024-05-14 RX ORDER — CLOTRIMAZOLE 1 %
CREAM (GRAM) TOPICAL 4 TIMES DAILY
Qty: 60 G | Refills: 1 | Status: SHIPPED | OUTPATIENT
Start: 2024-05-14

## 2024-05-14 ASSESSMENT — EDINBURGH POSTNATAL DEPRESSION SCALE (EPDS)
THINGS HAVE BEEN GETTING ON TOP OF ME: NO, I HAVE BEEN COPING AS WELL AS EVER
I HAVE BEEN SO UNHAPPY THAT I HAVE BEEN CRYING: NO, NEVER
I HAVE BEEN SO UNHAPPY THAT I HAVE HAD DIFFICULTY SLEEPING: NOT AT ALL
I HAVE FELT SCARED OR PANICKY FOR NO GOOD REASON: NO, NOT AT ALL
TOTAL SCORE: 0
I HAVE FELT SAD OR MISERABLE: NO, NOT AT ALL
THINGS HAVE BEEN GETTING ON TOP OF ME: NO, I HAVE BEEN COPING AS WELL AS EVER
I HAVE BEEN SO UNHAPPY THAT I HAVE BEEN CRYING: NO, NEVER
I HAVE BEEN ANXIOUS OR WORRIED FOR NO GOOD REASON: NO, NOT AT ALL
I HAVE LOOKED FORWARD WITH ENJOYMENT TO THINGS: AS MUCH AS I EVER DID
I HAVE LOOKED FORWARD WITH ENJOYMENT TO THINGS: AS MUCH AS I EVER DID
I HAVE BLAMED MYSELF UNNECESSARILY WHEN THINGS WENT WRONG: NO, NEVER
I HAVE BLAMED MYSELF UNNECESSARILY WHEN THINGS WENT WRONG: NO, NEVER
I HAVE FELT SAD OR MISERABLE: NO, NOT AT ALL
THE THOUGHT OF HARMING MYSELF HAS OCCURRED TO ME: NEVER
THE THOUGHT OF HARMING MYSELF HAS OCCURRED TO ME: NEVER
I HAVE BEEN ABLE TO LAUGH AND SEE THE FUNNY SIDE OF THINGS: AS MUCH AS I ALWAYS COULD
I HAVE BEEN ABLE TO LAUGH AND SEE THE FUNNY SIDE OF THINGS: AS MUCH AS I ALWAYS COULD
I HAVE BEEN ANXIOUS OR WORRIED FOR NO GOOD REASON: NO, NOT AT ALL
I HAVE FELT SCARED OR PANICKY FOR NO GOOD REASON: NO, NOT AT ALL
I HAVE BEEN SO UNHAPPY THAT I HAVE HAD DIFFICULTY SLEEPING: NOT AT ALL

## 2024-05-14 ASSESSMENT — ANXIETY QUESTIONNAIRES
7. FEELING AFRAID AS IF SOMETHING AWFUL MIGHT HAPPEN: NOT AT ALL
GAD7 TOTAL SCORE: 0
IF YOU CHECKED OFF ANY PROBLEMS ON THIS QUESTIONNAIRE, HOW DIFFICULT HAVE THESE PROBLEMS MADE IT FOR YOU TO DO YOUR WORK, TAKE CARE OF THINGS AT HOME, OR GET ALONG WITH OTHER PEOPLE: NOT DIFFICULT AT ALL
1. FEELING NERVOUS, ANXIOUS, OR ON EDGE: NOT AT ALL
5. BEING SO RESTLESS THAT IT IS HARD TO SIT STILL: NOT AT ALL
7. FEELING AFRAID AS IF SOMETHING AWFUL MIGHT HAPPEN: NOT AT ALL
4. TROUBLE RELAXING: NOT AT ALL
8. IF YOU CHECKED OFF ANY PROBLEMS, HOW DIFFICULT HAVE THESE MADE IT FOR YOU TO DO YOUR WORK, TAKE CARE OF THINGS AT HOME, OR GET ALONG WITH OTHER PEOPLE?: NOT DIFFICULT AT ALL
6. BECOMING EASILY ANNOYED OR IRRITABLE: NOT AT ALL
2. NOT BEING ABLE TO STOP OR CONTROL WORRYING: NOT AT ALL
3. WORRYING TOO MUCH ABOUT DIFFERENT THINGS: NOT AT ALL
GAD7 TOTAL SCORE: 0

## 2024-05-14 ASSESSMENT — PAIN SCALES - GENERAL: PAINLEVEL: MODERATE PAIN (4)

## 2024-05-14 NOTE — PROGRESS NOTES
Follow-Up Visit    S: Ms. Nato Bradley is a 39 year old  s/p  on 24, here for BP check and nipple infection concerns. She has been using topical nystatin for yeast of her nipple but is not improving. She has scaling and cracking of the left nipple. She has pain in her breast with pumping but otherwise does not have pain. Her BPs at home have been 110-120s/70-80s on once daily Nifedipine XL 30mg.     O:  /78 (BP Location: Right arm, Patient Position: Sitting, Cuff Size: Adult Regular)   Pulse 68   Temp 98.5  F (36.9  C)   Wt 59 kg (130 lb)   LMP 2023   BMI 25.39 kg/m    Gen: Well-appearing, NAD  Pulm: Nonlabored  Breasts: lactating breasts. Pink erythema of left nipple with peripheral scaling and cracking in the center. No cracking or scaling of right nipple. No fluctuant masses or tenderness  Psych: appropriate mood and affect    A/P:  Ms. Nato Bradley is a 39 year old  here for postpartum BP check and yeast infection of the left nipple. Can stop nifedipine and send MyChart message in 3-4 days with BP values. Will switch nipple cream to miconazole- if no improvement, will switch to PO diflucan    Peri Henry MD  OB/GYN  2024 12:50 PM

## 2024-05-14 NOTE — NURSING NOTE
"Chief Complaint   Patient presents with    Postpartum Care     Breast concern     Patient concern: \"cracked nipple.\"    Initial LMP 07/28/2023  Estimated body mass index is 28.51 kg/m  as calculated from the following:    Height as of 4/26/24: 1.524 m (5').    Weight as of 4/26/24: 66.2 kg (146 lb).  Medication Reconciliation: complete    Clara Zamora RN   "

## 2024-06-04 ENCOUNTER — PRENATAL OFFICE VISIT (OUTPATIENT)
Dept: OBGYN | Facility: OTHER | Age: 39
End: 2024-06-04
Attending: OBSTETRICS & GYNECOLOGY
Payer: COMMERCIAL

## 2024-06-04 VITALS
WEIGHT: 129 LBS | DIASTOLIC BLOOD PRESSURE: 80 MMHG | HEART RATE: 84 BPM | SYSTOLIC BLOOD PRESSURE: 118 MMHG | BODY MASS INDEX: 25.19 KG/M2

## 2024-06-04 PROCEDURE — 99207 PR POST-PARTUM 6 WK VISIT - GICH ONLY: CPT | Performed by: OBSTETRICS & GYNECOLOGY

## 2024-06-04 ASSESSMENT — PAIN SCALES - GENERAL: PAINLEVEL: NO PAIN (0)

## 2024-06-04 NOTE — LETTER
June 4, 2024      Nato Bradley  [id number] [address] [facility] [id number]        To Whom It May Concern:    Nato Bradley was seen in our clinic. She may return to work without restrictions.      Sincerely,        Peri Henry MD

## 2024-06-04 NOTE — PROGRESS NOTES
6 week Postpartum Visit Note    S:  Ms. Nato Bradley is a 39 year old  here for her 6-week postpartum checkup.   - Had a  on 24, surrogate pregnancy.  - Infant gender:  boy, weight 8 pounds 13.9 oz.  - Feeding Method:  pumping and sending milk  - Bleeding:  None.  Duration:  stopped after a few weeks.  Menses resumed:  No  - Bowel/Urinary problems:  No  - Mood: good  - Sleep: good    [place] Depression Scale  Thoughts of Harming Self:    Total Score:        - Contraception Planned:  s/p sterilization  - She  has had intercourse since delivery and experienced  No discomfort.  .    - Current tobacco use:  No  - Hx of Abuse:  No  ================================================================  ROS: 10 point ROS neg other than the symptoms noted above in the HPI.     O:  /80 (BP Location: Right arm, Patient Position: Sitting, Cuff Size: Adult Regular)   Pulse 84   Wt 58.5 kg (129 lb)   LMP 2023   Breastfeeding Yes   BMI 25.19 kg/m    Gen: Well-appearing, NAD  Psych:  Appropriate mood and affect  Breast: Deferred, no concerns  Abd:  Benign and Soft, flat, non-tender  Pelvic: Well healed perineum. Normal vagina and cervix. Uterus normal size, anteverted, nontender. No adnexal masses or tenderness      A/P:  Ms. Nato Bradley is a 39 year old  here for 6 week postpartum visit after , surrogate pregnancy. Doing well.  - Contraception: s/p sterilization  - Feeding: pumping and sending milk  - Follow-up: annually or sooner lydia Henry MD  OB/GYN  2024 10:46 AM

## 2024-06-04 NOTE — NURSING NOTE
Chief Complaint   Patient presents with    Postpartum Care     6 week post partum        Medication Reconciliation: complete        Belkis Manzo LPN........................6/4/2024  10:48 AM

## 2024-08-28 ENCOUNTER — OFFICE VISIT (OUTPATIENT)
Dept: FAMILY MEDICINE | Facility: OTHER | Age: 39
End: 2024-08-28
Attending: NURSE PRACTITIONER
Payer: COMMERCIAL

## 2024-08-28 VITALS
BODY MASS INDEX: 25.32 KG/M2 | DIASTOLIC BLOOD PRESSURE: 82 MMHG | TEMPERATURE: 97.6 F | HEART RATE: 56 BPM | WEIGHT: 129 LBS | RESPIRATION RATE: 16 BRPM | OXYGEN SATURATION: 100 % | HEIGHT: 60 IN | SYSTOLIC BLOOD PRESSURE: 122 MMHG

## 2024-08-28 DIAGNOSIS — R39.9 UTI SYMPTOMS: ICD-10-CM

## 2024-08-28 DIAGNOSIS — Z32.00 ENCOUNTER FOR PREGNANCY TEST, RESULT UNKNOWN: Primary | ICD-10-CM

## 2024-08-28 DIAGNOSIS — R35.0 URINARY FREQUENCY: ICD-10-CM

## 2024-08-28 DIAGNOSIS — Z32.02 PREGNANCY EXAMINATION OR TEST, NEGATIVE RESULT: ICD-10-CM

## 2024-08-28 LAB
ALBUMIN UR-MCNC: NEGATIVE MG/DL
APPEARANCE UR: CLEAR
BACTERIA #/AREA URNS HPF: ABNORMAL /HPF
BILIRUB UR QL STRIP: NEGATIVE
COLOR UR AUTO: ABNORMAL
GLUCOSE UR STRIP-MCNC: NEGATIVE MG/DL
HCG INTACT+B SERPL-ACNC: <1 MIU/ML
HCG UR QL: NEGATIVE
HGB UR QL STRIP: NEGATIVE
KETONES UR STRIP-MCNC: NEGATIVE MG/DL
LEUKOCYTE ESTERASE UR QL STRIP: ABNORMAL
MUCOUS THREADS #/AREA URNS LPF: PRESENT /LPF
NITRATE UR QL: NEGATIVE
PH UR STRIP: 7 [PH] (ref 5–9)
RBC URINE: 0 /HPF
SP GR UR STRIP: 1.02 (ref 1–1.03)
SQUAMOUS EPITHELIAL: 3 /HPF
UROBILINOGEN UR STRIP-MCNC: NORMAL MG/DL
WBC URINE: 8 /HPF

## 2024-08-28 PROCEDURE — 84702 CHORIONIC GONADOTROPIN TEST: CPT | Mod: ZL

## 2024-08-28 PROCEDURE — 36415 COLL VENOUS BLD VENIPUNCTURE: CPT | Mod: ZL

## 2024-08-28 PROCEDURE — 99213 OFFICE O/P EST LOW 20 MIN: CPT

## 2024-08-28 PROCEDURE — 81001 URINALYSIS AUTO W/SCOPE: CPT | Mod: ZL

## 2024-08-28 PROCEDURE — 81025 URINE PREGNANCY TEST: CPT | Mod: ZL

## 2024-08-28 ASSESSMENT — PAIN SCALES - GENERAL: PAINLEVEL: NO PAIN (0)

## 2024-08-28 NOTE — PROGRESS NOTES
ASSESSMENT/PLAN:    I have reviewed the nursing notes.  I have reviewed the findings, diagnosis, plan and need for follow up with the patient.    1. UTI symptoms  2. Urinary frequency    - UA Macroscopic with reflex to Microscopic and Culture-  Urinalysis does not indicated a urinary tract infection, culture will not be completed.      3. Encounter for pregnancy test, result unknown  4. Pregnancy examination or test, negative result    - Pregnancy, Urine (HCG)- Negative results    - HCG quantitative pregnancy- Negative results    - May use over-the-counter Tylenol or Ibuprofen as needed for pain or fever    - Discussed warning signs/symptoms indicative of need to f/u    - Follow up if symptoms persist or worsen or concerns    - I explained my diagnostic considerations and recommendations to the patient, who voiced understanding and agreement with the treatment plan. All questions were answered. We discussed potential side effects of any prescribed or recommended therapies, as well as expectations for response to treatments.    Candida Contreras, LEIF CNP  8/28/2024  6:19 PM    HPI:    Nato Bradley is a 39 year old female who presents to [facility] today for concerns of possible UTI and/or  pregnancy.  States that she is having urinary frequency for the past couple of weeks.  Patient is 4 months postpartum from a surrogate pregnancy.  Patient states that she has not had a period since she gave birth therefore if she is pregnant right now she does not know how far along she would be.  Patient states that this would not be a planned pregnancy and is scheduled to be a surrogate again in April.  Patient denies fever, chills, low back or flank pain, abdominal pain, hematuria, abnormal vaginal discharge, dysuria, urinary urgency or incontinence, shortness of breath, chest pain, nausea, vomiting, diarrhea, headache, lightheadedness, dizziness, otalgia, rash or any recent or present cold symptoms.    Past Medical History:    Diagnosis Date    2019 novel coronavirus disease (COVID-19) 2021    Depressive disorder     Genital herpes     History of human papillomavirus infection     Personal history of other mental and behavioral disorders     No Comments Provided    Urinary tract infection      Past Surgical History:   Procedure Laterality Date    C/SECTION, LOW TRANSVERSE       SECTION      No Comments Provided    EXTRACTION(S) DENTAL      No Comments Provided     Social History     Tobacco Use    Smoking status: Never    Smokeless tobacco: Never   Substance Use Topics    Alcohol use: Yes     Comment: occasional     Current Outpatient Medications   Medication Sig Dispense Refill    ketoconazole (NIZORAL) 2 % external shampoo Apply topically daily as needed      metroNIDAZOLE (METROCREAM) 0.75 % external cream Apply topically 2 times daily      Prenatal Vit-Fe Fumarate-FA (PNV PRENATAL PLUS MULTIVITAMIN) 27-1 MG TABS per tablet Take 1 tablet by mouth daily      clotrimazole (LOTRIMIN) 1 % external cream Apply topically 4 times daily (Patient not taking: Reported on 2024) 60 g 1    nystatin (MYCOSTATIN) 673080 UNIT/GM external cream Apply topically 4 times daily (Patient not taking: Reported on 2024) 30 g 0    valACYclovir (VALTREX) 500 MG tablet Take 1 tablet (500 mg) by mouth daily (Patient not taking: Reported on 2024) 30 tablet 0     No Known Allergies  Past medical history, past surgical history, current medications and allergies reviewed and accurate to the best of my knowledge.      ROS:  Refer to HPI    Temp 97.6  F (36.4  C) (Tympanic)   Ht 1.524 m (5')   Wt 58.5 kg (129 lb)   LMP  (LMP Unknown)   Breastfeeding No   BMI 25.19 kg/m      EXAM:  General Appearance: Well appearing 39 year old female, appropriate appearance for age. No acute distress   Respiratory: normal chest wall and respirations.  Normal effort.  Clear to auscultation bilaterally, no wheezing, crackles or rhonchi.  No increased  work of breathing.  No cough appreciated.  Cardiac: RRR with no murmurs  Abdomen: soft, nontender, no rigidity, no rebound tenderness or guarding, normal bowel sounds present  :  No suprapubic tenderness to palpation.  Absent CVA tenderness to palpation.    Musculoskeletal:  Equal movement of bilateral upper extremities.  Equal movement of bilateral lower extremities.  Normal gait.    Neuro: Alert and oriented to person, place, and time.    Psychological: normal affect, alert, oriented, and pleasant.     Labs:  Results for orders placed or performed in visit on 08/28/24   UA Macroscopic with reflex to Microscopic and Culture     Status: Abnormal    Specimen: Urine, Clean Catch   Result Value Ref Range    Color Urine Light Yellow Colorless, Straw, Light Yellow, Yellow    Appearance Urine Clear Clear    Glucose Urine Negative Negative mg/dL    Bilirubin Urine Negative Negative    Ketones Urine Negative Negative mg/dL    Specific Gravity Urine 1.024 1.000 - 1.030    Blood Urine Negative Negative    pH Urine 7.0 5.0 - 9.0    Protein Albumin Urine Negative Negative mg/dL    Urobilinogen Urine Normal Normal, 2.0 mg/dL    Nitrite Urine Negative Negative    Leukocyte Esterase Urine Small (A) Negative    Bacteria Urine Few (A) None Seen /HPF    Mucus Urine Present (A) None Seen /LPF    RBC Urine 0 <=2 /HPF    WBC Urine 8 (H) <=5 /HPF    Squamous Epithelials Urine 3 (H) <=1 /HPF    Narrative    Urine Culture not indicated   Pregnancy, Urine (HCG)     Status: Normal   Result Value Ref Range    hCG Urine Qualitative Negative Negative   HCG quantitative pregnancy     Status: Normal   Result Value Ref Range    hCG Quantitative <1 <5 mIU/mL

## 2024-08-28 NOTE — NURSING NOTE
Chief Complaint   Patient presents with    Urinary Problem     X 2 weeks    Pregnancy Test     2 pos home tests today - post partum surrogate (4/23/2024)     Patient wants to confirm pregnancy and is unsure of uti.  Patient is 4 months post partum following a surrogate pregnancy.  Has not had a normal menses since delivery.    Tx with increased water intake and cranberry supplements without relief.    Initial /82 (BP Location: Right arm, Patient Position: Sitting, Cuff Size: Adult Regular)   Pulse 56   Temp 97.6  F (36.4  C) (Tympanic)   Resp 16   Ht 1.524 m (5')   Wt 58.5 kg (129 lb)   LMP  (LMP Unknown)   SpO2 100%   Breastfeeding No   BMI 25.19 kg/m   Estimated body mass index is 25.19 kg/m  as calculated from the following:    Height as of this encounter: 1.524 m (5').    Weight as of this encounter: 58.5 kg (129 lb).     Advance Care Directive on file? y    FOOD SECURITY SCREENING QUESTIONS:    The next two questions are to help us understand your food security.  If you are feeling you need any assistance in this area, we have resources available to support you today.    Hunger Vital Signs:  Within the past 12 months we worried whether our food would run out before we got money to buy more. Never  Within the past 12 months the food we bought just didn't last and we didn't have money to get more. Never  Maritza Larsen LPN,LPN on 8/28/2024 at 6:33 PM      Maritza aLrsen LPN

## 2024-09-21 ENCOUNTER — HEALTH MAINTENANCE LETTER (OUTPATIENT)
Age: 39
End: 2024-09-21

## 2025-02-27 ENCOUNTER — OFFICE VISIT (OUTPATIENT)
Dept: OBGYN | Facility: OTHER | Age: 40
End: 2025-02-27
Attending: NURSE PRACTITIONER
Payer: COMMERCIAL

## 2025-02-27 VITALS
WEIGHT: 134 LBS | HEART RATE: 62 BPM | SYSTOLIC BLOOD PRESSURE: 128 MMHG | BODY MASS INDEX: 26.17 KG/M2 | DIASTOLIC BLOOD PRESSURE: 72 MMHG

## 2025-02-27 DIAGNOSIS — Z01.419 WELL WOMAN EXAM WITH ROUTINE GYNECOLOGICAL EXAM: Primary | ICD-10-CM

## 2025-02-27 DIAGNOSIS — Z12.39 ENCOUNTER FOR SCREENING BREAST EXAMINATION: ICD-10-CM

## 2025-02-27 DIAGNOSIS — Z12.4 CERVICAL CANCER SCREENING: ICD-10-CM

## 2025-02-27 ASSESSMENT — PAIN SCALES - GENERAL: PAINLEVEL_OUTOF10: NO PAIN (0)

## 2025-02-27 NOTE — PROGRESS NOTES
CHIEF COMPLAINT: Well woman exam    HPI  Nato is a 40 year old , who presents to clinic today for well woman exam.  Patient is planning on being a surrogate and would update her preventative health.  She is due for cervical cancer screening, pelvic exam and breast exam.   She was last seen in our OB department on 2024 for 6-week postpartum visit following a  on 2024, surrogate pregnancy.  Patient inquired 2024 for clearance to become surrogate again.  This will be within no family.    History of cervical cancer screenin21- NILM, HPV-  20- NILM, HPV-  Reports remote history of 1 abnormal Pap as a teenager, all the rest of been normal.    Patient's last menstrual period was 2025 (approximate).    I have reviewed the nursing notes.  I have reviewed allergies, medication list, problem list, and past medical history.    OB HISTORY  OB History    Para Term  AB Living   8 6 6 0 2 6   SAB IAB Ectopic Multiple Live Births   0 1 1 0 6      # Outcome Date GA Lbr Jovi/2nd Weight Sex Type Anes PTL Lv   8 Term 24 38w3d 02:15 / 00:06 4.023 kg (8 lb 13.9 oz) M  EPI N TM      Name: Raul Vela      Apgar1: 8  Apgar5: 9   7 Term 20 38w1d  3.042 kg (6 lb 11.3 oz) F    MT      Birth Comments: Surrogate   6 Term 16 39w0d  3.345 kg (7 lb 6 oz) F Vag-Spont EPI N MT      Name: Clari      Apgar1: 9  Apgar5: 9   5 Term 14 38w0d  2.92 kg (6 lb 7 oz) M CS-LTranv EPI N MT      Complications: Breech presentation      Name: Jerome   4 Term 10/28/11 38w0d  2.977 kg (6 lb 9 oz) M Vag-Spont EPI N MT      Name: Jim   3 Ectopic 07           2 Term 03 37w0d  3.033 kg (6 lb 11 oz) M Vag-Spont EPI Y MT      Name: sebastian jeronimo   1 IAB                ROS  10-Point ROS negative except as noted in HPI    PHYSICAL EXAM  /72   Pulse 62   Wt 60.8 kg (134 lb)   LMP 2025 (Approximate)   Breastfeeding No   BMI 26.17  kg/m    Body mass index is 26.17 kg/m .  Gen: Well-appearing, well developed, non toxic  Neck: No thyromegaly or mass.  Right submandible palpable lymph node otherwise no adenopathy.  Resp: Clear to auscultation, nonlabored, normal effort, no audible wheezing or cough  Cardiac: Heart rate rhythm regular, no murmur  Breast Exam: Bilateral breast symmetrical, no palpable mass nodules, no rash, skin changes, erythema, warmth or lesions.  Nipples everted, symmetrical, no drainage.  GI: soft, nontender, no flank pain  MS: moving without difficulty  Psych: appropriate mood and affect    Pelvic:  Normal appearing external female genitalia. Normal hair distribution. Vagina is without lesions. Small white discharge. Cervix normal, no lesions, no cervical motion tenderness. Uterus is small, mobile, non-tender. No adnexal tenderness or masses. Chaperone present     DIAGNOSTICS  No results found for any visits on 25.     ASSESSMENT/PLAN  Nato Bradley is a 40 year old  here for well woman exam with cervical cancer screening, pelvic exam and breast exam.  1. Well woman exam with routine gynecological exam (Primary)  Patient presents for well woman exam today.  She is planning to be a surrogate for another pregnancy.  This would be the second family she is a surrogate for her.  Pelvic exam completed today with no concerning findings.  Cervical cancer screening, pelvic exam and breast exam updated today.  She will continue following with her fertility clinic as planned.  She knows to reach out if she has any further concerns or questions.    2. Encounter for screening breast examination  Patient stopped lactating 1 week ago.  Denies any breast concerns.  Normal breast exam today.    3. Cervical cancer screening  - HPV and Gynecologic Cytology Panel - Recommended Age 30 - 65 Years  - Gynecologic Cytology (PAP)   Cervical cancer screening updated, no recent history of abnormal Paps.  Remote abnormal when she was a  teenager otherwise Paps have been normal since then.  If results returned normal, next Pap will be due in 5 years.    Carmen Case NP  [facility]  OBN

## 2025-03-05 LAB
BKR AP ASSOCIATED HPV REPORT: NORMAL
BKR LAB AP GYN ADEQUACY: NORMAL
BKR LAB AP GYN INTERPRETATION: NORMAL
BKR LAB AP LMP: NORMAL
BKR LAB AP PREVIOUS ABNORMAL: NORMAL
PATH REPORT.COMMENTS IMP SPEC: NORMAL
PATH REPORT.COMMENTS IMP SPEC: NORMAL
PATH REPORT.RELEVANT HX SPEC: NORMAL

## 2025-03-06 ENCOUNTER — MYC MEDICAL ADVICE (OUTPATIENT)
Dept: OBGYN | Facility: OTHER | Age: 40
End: 2025-03-06
Payer: COMMERCIAL

## 2025-03-12 LAB
HPV HR 12 DNA CVX QL NAA+PROBE: NEGATIVE
HPV16 DNA CVX QL NAA+PROBE: NEGATIVE
HPV18 DNA CVX QL NAA+PROBE: NEGATIVE
HUMAN PAPILLOMA VIRUS FINAL DIAGNOSIS: NORMAL

## 2025-05-15 ENCOUNTER — TELEPHONE (OUTPATIENT)
Dept: OBGYN | Facility: OTHER | Age: 40
End: 2025-05-15
Payer: COMMERCIAL

## 2025-05-15 NOTE — TELEPHONE ENCOUNTER
After verifying pt last name and date of birth, [facility] requesting we fax over pt last PAP result     [phone]    Luli Pandey RN on 5/15/2025 at 3:19 PM

## (undated) RX ORDER — FENTANYL CITRATE 50 UG/ML
INJECTION, SOLUTION INTRAMUSCULAR; INTRAVENOUS
Status: DISPENSED
Start: 2020-12-02